# Patient Record
Sex: MALE | Race: WHITE | NOT HISPANIC OR LATINO | URBAN - NONMETROPOLITAN AREA
[De-identification: names, ages, dates, MRNs, and addresses within clinical notes are randomized per-mention and may not be internally consistent; named-entity substitution may affect disease eponyms.]

---

## 2021-05-26 ENCOUNTER — OFFICE VISIT (OUTPATIENT)
Dept: CARDIOLOGY | Facility: CLINIC | Age: 46
End: 2021-05-26

## 2021-05-26 VITALS
WEIGHT: 288 LBS | TEMPERATURE: 97.2 F | HEIGHT: 76 IN | BODY MASS INDEX: 35.07 KG/M2 | DIASTOLIC BLOOD PRESSURE: 108 MMHG | OXYGEN SATURATION: 98 % | HEART RATE: 81 BPM | SYSTOLIC BLOOD PRESSURE: 170 MMHG

## 2021-05-26 DIAGNOSIS — R07.89 OTHER CHEST PAIN: Primary | ICD-10-CM

## 2021-05-26 DIAGNOSIS — I10 ESSENTIAL HYPERTENSION: ICD-10-CM

## 2021-05-26 DIAGNOSIS — R06.02 SHORTNESS OF BREATH: ICD-10-CM

## 2021-05-26 DIAGNOSIS — E78.5 HYPERLIPIDEMIA, UNSPECIFIED HYPERLIPIDEMIA TYPE: ICD-10-CM

## 2021-05-26 DIAGNOSIS — Z82.49 FAMILY HISTORY OF EARLY CAD: ICD-10-CM

## 2021-05-26 DIAGNOSIS — R00.2 PALPITATIONS: ICD-10-CM

## 2021-05-26 DIAGNOSIS — F17.200 SMOKING: ICD-10-CM

## 2021-05-26 PROBLEM — E11.9 DM (DIABETES MELLITUS), TYPE 2: Status: ACTIVE | Noted: 2021-05-26

## 2021-05-26 PROBLEM — IMO0001 SMOKING: Status: ACTIVE | Noted: 2021-05-26

## 2021-05-26 PROCEDURE — 99204 OFFICE O/P NEW MOD 45 MIN: CPT | Performed by: NURSE PRACTITIONER

## 2021-05-26 RX ORDER — CLONIDINE HYDROCHLORIDE 0.1 MG/1
0.1 TABLET ORAL 3 TIMES DAILY PRN
Qty: 30 TABLET | Refills: 5 | Status: SHIPPED | OUTPATIENT
Start: 2021-05-26 | End: 2022-03-31 | Stop reason: SDUPTHER

## 2021-05-26 RX ORDER — NITROGLYCERIN 0.4 MG/1
TABLET SUBLINGUAL
Qty: 30 TABLET | Refills: 5 | Status: SHIPPED | OUTPATIENT
Start: 2021-05-26 | End: 2022-03-31 | Stop reason: SDUPTHER

## 2021-05-26 RX ORDER — AMLODIPINE BESYLATE 10 MG/1
10 TABLET ORAL DAILY
COMMUNITY

## 2021-05-26 RX ORDER — ASPIRIN 325 MG
325 TABLET ORAL DAILY
COMMUNITY
End: 2021-06-08 | Stop reason: DRUGHIGH

## 2021-05-26 RX ORDER — GEMFIBROZIL 600 MG/1
600 TABLET, FILM COATED ORAL
COMMUNITY
End: 2022-03-31

## 2021-05-26 RX ORDER — ATORVASTATIN CALCIUM 20 MG/1
20 TABLET, FILM COATED ORAL DAILY
Qty: 30 TABLET | Refills: 11 | Status: SHIPPED | OUTPATIENT
Start: 2021-05-26 | End: 2022-04-01 | Stop reason: ALTCHOICE

## 2021-05-26 RX ORDER — CARVEDILOL 6.25 MG/1
6.25 TABLET ORAL 2 TIMES DAILY WITH MEALS
COMMUNITY
End: 2021-05-26 | Stop reason: SDUPTHER

## 2021-05-26 RX ORDER — LOSARTAN POTASSIUM 100 MG/1
100 TABLET ORAL DAILY
COMMUNITY
End: 2021-08-03 | Stop reason: ALTCHOICE

## 2021-05-26 RX ORDER — CARVEDILOL 12.5 MG/1
12.5 TABLET ORAL 2 TIMES DAILY WITH MEALS
Qty: 60 TABLET | Refills: 11 | Status: SHIPPED | OUTPATIENT
Start: 2021-05-26 | End: 2022-06-09 | Stop reason: SDUPTHER

## 2021-05-26 NOTE — PROGRESS NOTES
Subjective   Stone Villeda is a 46 y.o. male     Chief Complaint   Patient presents with   • Establish Care       HPI    Problem list:    1.  Chest pain  2.  Hypertension  3.  Hyperlipidemia  4.  Palpitations  5.  Shortness of breath  6.  Family history of early CAD; Brother first MI @ 38; DAD CABG @ 49  7.  DAORN, CPAP  8.  Diabetes Mellitus II; recently dx   9.  Smoking Habituation    Patient is a 46-year-old male who presents today to establish care.  Patient states that he will get what he describes as left anterior sharp pain and pressure that radiates into his arms.  He says sometimes it can be both but usually it is just the left arm.  He says he has it more with activity and then also if it is really hot weather.  He says that he will get short of breath and lightheaded whenever it occurs.  He also has palpitations where he feels like his heart beats fast all the time.  He says this is separate from his chest discomfort.  He denies any dizziness, presyncope, syncope, orthopnea, PND or edema.  He says he does have shortness of breath at times it just comes and goes.  He is fatigued quite a bit.  Patient says he has had high blood pressure since he was 16 years old.  He used to see Dr. Serrato in [facility].  Patient fractured his kneecap several years ago and has been having a lot of issues with it lately therefore he is unable to walk on a treadmill.    Occupation: Works in [employer]  He is smoked a pack a day for 30 years; no alcohol or illicit drugs  He drinks three Mountain Dew's per day; no coffee or tea    Mom sixty-one alive-no heart problems  Dad seventy-six -CABG x3 the first one was when he was 49 years old he has had two different CABG x3's, stents, hypertension, hyperlipidemia  Brother forty-three alive-MI x5 first one at thirty-eight and he has six stents    Patient says that his father's entire side of the family has heart disease.  This is includes the females.    We went over his labs.  His  last LDL was 174 and triglycerides were 218.  His BNP was slightly elevated at 391.  Patient's A1c was 6.5.    Current Outpatient Medications on File Prior to Visit   Medication Sig Dispense Refill   • amLODIPine (Norvasc) 10 MG tablet Take 10 mg by mouth Daily.     • aspirin 325 MG tablet Take 325 mg by mouth Daily.     • gemfibrozil (Lopid) 600 MG tablet Take 600 mg by mouth 2 (Two) Times a Day Before Meals.     • losartan (COZAAR) 50 MG tablet Take 100 mg by mouth Daily.     • metFORMIN (GLUCOPHAGE) 500 MG tablet Take 500 mg by mouth 2 (Two) Times a Day With Meals.     • [DISCONTINUED] carvedilol (COREG) 6.25 MG tablet Take 6.25 mg by mouth 2 (Two) Times a Day With Meals.       No current facility-administered medications on file prior to visit.       ALLERGIES    Patient has no known allergies.    Past Medical History:   Diagnosis Date   • Diabetes mellitus (CMS/HCC)    • Hyperlipidemia    • Hypertension    • Sleep apnea        Social History     Socioeconomic History   • Marital status: Single     Spouse name: Not on file   • Number of children: Not on file   • Years of education: Not on file   • Highest education level: Not on file   Tobacco Use   • Smoking status: Current Every Day Smoker     Packs/day: 1.00     Years: 30.00     Pack years: 30.00     Types: Cigarettes   • Smokeless tobacco: Never Used   Substance and Sexual Activity   • Alcohol use: Never   • Drug use: Defer   • Sexual activity: Defer       Family History   Problem Relation Age of Onset   • Breast cancer Mother    • Hypertension Father    • Heart attack Father    • Heart attack Brother        Review of Systems   Constitutional: Positive for fatigue (tired alot ). Negative for appetite change, chills, diaphoresis and fever.   HENT: Negative for congestion, rhinorrhea and sore throat.    Eyes: Negative for visual disturbance.   Respiratory: Positive for apnea (kerri, uses CPAP) and shortness of breath (at times it comes and goes; with CP ).  "Negative for cough, chest tightness and wheezing.    Cardiovascular: Positive for chest pain (left anterior sharp chest pain/pressure rad into arms; can be both, but usually one; with hotter weather and with activity ) and palpitations (beats fast all of the time). Negative for leg swelling.   Gastrointestinal: Negative for abdominal pain, blood in stool, constipation, diarrhea, nausea and vomiting.   Endocrine: Negative for cold intolerance and heat intolerance.   Genitourinary: Negative for difficulty urinating, dysuria, frequency, hematuria and urgency.   Musculoskeletal: Positive for joint swelling (right knee ). Negative for arthralgias, back pain, neck pain and neck stiffness.   Skin: Negative for color change, pallor, rash and wound.   Allergic/Immunologic: Negative for environmental allergies and food allergies.   Neurological: Positive for weakness (both legs ) and light-headedness (vision will blur w/CP). Negative for dizziness, numbness and headaches.   Hematological: Does not bruise/bleed easily.   Psychiatric/Behavioral: Positive for sleep disturbance (cpac machine ).       Objective   BP (!) 170/108 (BP Location: Left arm)   Pulse 81   Temp 97.2 °F (36.2 °C)   Ht 193 cm (76\")   Wt 131 kg (288 lb)   SpO2 98%   BMI 35.06 kg/m²   Vitals:    05/26/21 1026   BP: (!) 170/108   BP Location: Left arm   Pulse: 81   Temp: 97.2 °F (36.2 °C)   SpO2: 98%   Weight: 131 kg (288 lb)   Height: 193 cm (76\")      Lab Results (most recent)     None        Physical Exam  Vitals reviewed.   Constitutional:       General: He is awake.      Appearance: Normal appearance. He is well-developed and well-groomed. He is obese.   HENT:      Head: Normocephalic.   Eyes:      General: Lids are normal.   Neck:      Vascular: No carotid bruit, hepatojugular reflux or JVD.   Cardiovascular:      Rate and Rhythm: Normal rate and regular rhythm.      Pulses:           Radial pulses are 2+ on the right side and 2+ on the left side. "        Dorsalis pedis pulses are 2+ on the right side and 2+ on the left side.        Posterior tibial pulses are 2+ on the right side and 2+ on the left side.      Heart sounds: Normal heart sounds.   Pulmonary:      Effort: Pulmonary effort is normal.      Breath sounds: Normal breath sounds and air entry.   Abdominal:      General: Bowel sounds are normal.      Palpations: Abdomen is soft.   Musculoskeletal:      Right lower leg: Edema (trace - 1+ ) present.      Left lower leg: Edema (trace - 1+ edema ) present.   Skin:     General: Skin is warm and dry.   Neurological:      Mental Status: He is alert and oriented to person, place, and time.   Psychiatric:         Attention and Perception: Attention and perception normal.         Mood and Affect: Mood and affect normal.         Speech: Speech normal.         Behavior: Behavior normal. Behavior is cooperative.         Thought Content: Thought content normal.         Cognition and Memory: Cognition and memory normal.         Judgment: Judgment normal.         Procedure   Procedures         Assessment/Plan      Diagnosis Plan   1. Other chest pain  nitroglycerin (NITROSTAT) 0.4 MG SL tablet    Stress Test With Myocardial Perfusion One Day    Adult Transthoracic Echo Complete W/ Cont if Necessary Per Protocol    Cardiac Event Monitor   2. Essential hypertension  carvedilol (COREG) 12.5 MG tablet    Stress Test With Myocardial Perfusion One Day    Adult Transthoracic Echo Complete W/ Cont if Necessary Per Protocol    Duplex Renal Artery - Bilateral Complete CAR    cloNIDine (Catapres) 0.1 MG tablet   3. Hyperlipidemia, unspecified hyperlipidemia type  atorvastatin (LIPITOR) 20 MG tablet    Stress Test With Myocardial Perfusion One Day   4. Smoking  Stress Test With Myocardial Perfusion One Day   5. Family history of early CAD  Stress Test With Myocardial Perfusion One Day   6. Shortness of breath  Stress Test With Myocardial Perfusion One Day    Adult Transthoracic  Echo Complete W/ Cont if Necessary Per Protocol   7. Palpitations  Stress Test With Myocardial Perfusion One Day    Adult Transthoracic Echo Complete W/ Cont if Necessary Per Protocol    Cardiac Event Monitor       Return in about 12 weeks (around 8/18/2021).  Chest pain/hypertension/hyperlipidemia/smoking/family history of early CAD/shortness of breath/palpitations-patient have an ischemia work-up, stress and echo.  Patient needs to have a nuclear stress test because he is unable to walk on a treadmill due to a prior knee injury where he fractured his kneecap.  He says he has been very limited lately due to having a flareup with discomfort in that knee.  Patient will use nitroglycerin as needed for chest pain no resolution he will go to the ER.  He will increase his carvedilol to 12.5 twice a day.  He will use clonidine 3 times daily as needed for SBP greater than 160 or DBP greater than ninety.  He will start Lipitor twenty.  He will continue his medication regimen otherwise.  Palpitations-he will wear an event monitor for 2 weeks.  Hypertension-he will have a renal artery ultrasound.  He will follow-up in 12 weeks or sooner if any changes or abnormalities with testing.    Patient will monitor blood pressure.     Stone Villeda  reports that he has been smoking cigarettes. He has a 30.00 pack-year smoking history. He has never used smokeless tobacco..   Electronically signed by:

## 2021-05-26 NOTE — PATIENT INSTRUCTIONS
"Hypertension, Adult  High blood pressure (hypertension) is when the force of blood pumping through the arteries is too strong. The arteries are the blood vessels that carry blood from the heart throughout the body. Hypertension forces the heart to work harder to pump blood and may cause arteries to become narrow or stiff. Untreated or uncontrolled hypertension can cause a heart attack, heart failure, a stroke, kidney disease, and other problems.  A blood pressure reading consists of a higher number over a lower number. Ideally, your blood pressure should be below 120/80. The first (\"top\") number is called the systolic pressure. It is a measure of the pressure in your arteries as your heart beats. The second (\"bottom\") number is called the diastolic pressure. It is a measure of the pressure in your arteries as the heart relaxes.  What are the causes?  The exact cause of this condition is not known. There are some conditions that result in or are related to high blood pressure.  What increases the risk?  Some risk factors for high blood pressure are under your control. The following factors may make you more likely to develop this condition:  · Smoking.  · Having type 2 diabetes mellitus, high cholesterol, or both.  · Not getting enough exercise or physical activity.  · Being overweight.  · Having too much fat, sugar, calories, or salt (sodium) in your diet.  · Drinking too much alcohol.  Some risk factors for high blood pressure may be difficult or impossible to change. Some of these factors include:  · Having chronic kidney disease.  · Having a family history of high blood pressure.  · Age. Risk increases with age.  · Race. You may be at higher risk if you are .  · Gender. Men are at higher risk than women before age 45. After age 65, women are at higher risk than men.  · Having obstructive sleep apnea.  · Stress.  What are the signs or symptoms?  High blood pressure may not cause symptoms. Very high " blood pressure (hypertensive crisis) may cause:  · Headache.  · Anxiety.  · Shortness of breath.  · Nosebleed.  · Nausea and vomiting.  · Vision changes.  · Severe chest pain.  · Seizures.  How is this diagnosed?  This condition is diagnosed by measuring your blood pressure while you are seated, with your arm resting on a flat surface, your legs uncrossed, and your feet flat on the floor. The cuff of the blood pressure monitor will be placed directly against the skin of your upper arm at the level of your heart. It should be measured at least twice using the same arm. Certain conditions can cause a difference in blood pressure between your right and left arms.  Certain factors can cause blood pressure readings to be lower or higher than normal for a short period of time:  · When your blood pressure is higher when you are in a health care provider's office than when you are at home, this is called white coat hypertension. Most people with this condition do not need medicines.  · When your blood pressure is higher at home than when you are in a health care provider's office, this is called masked hypertension. Most people with this condition may need medicines to control blood pressure.  If you have a high blood pressure reading during one visit or you have normal blood pressure with other risk factors, you may be asked to:  · Return on a different day to have your blood pressure checked again.  · Monitor your blood pressure at home for 1 week or longer.  If you are diagnosed with hypertension, you may have other blood or imaging tests to help your health care provider understand your overall risk for other conditions.  How is this treated?  This condition is treated by making healthy lifestyle changes, such as eating healthy foods, exercising more, and reducing your alcohol intake. Your health care provider may prescribe medicine if lifestyle changes are not enough to get your blood pressure under control, and  if:  · Your systolic blood pressure is above 130.  · Your diastolic blood pressure is above 80.  Your personal target blood pressure may vary depending on your medical conditions, your age, and other factors.  Follow these instructions at home:  Eating and drinking    · Eat a diet that is high in fiber and potassium, and low in sodium, added sugar, and fat. An example eating plan is called the DASH (Dietary Approaches to Stop Hypertension) diet. To eat this way:  ? Eat plenty of fresh fruits and vegetables. Try to fill one half of your plate at each meal with fruits and vegetables.  ? Eat whole grains, such as whole-wheat pasta, brown rice, or whole-grain bread. Fill about one fourth of your plate with whole grains.  ? Eat or drink low-fat dairy products, such as skim milk or low-fat yogurt.  ? Avoid fatty cuts of meat, processed or cured meats, and poultry with skin. Fill about one fourth of your plate with lean proteins, such as fish, chicken without skin, beans, eggs, or tofu.  ? Avoid pre-made and processed foods. These tend to be higher in sodium, added sugar, and fat.  · Reduce your daily sodium intake. Most people with hypertension should eat less than 1,500 mg of sodium a day.  · Do not drink alcohol if:  ? Your health care provider tells you not to drink.  ? You are pregnant, may be pregnant, or are planning to become pregnant.  · If you drink alcohol:  ? Limit how much you use to:  § 0-1 drink a day for women.  § 0-2 drinks a day for men.  ? Be aware of how much alcohol is in your drink. In the [place] one drink equals one 12 oz bottle of beer (355 mL), one 5 oz glass of wine (148 mL), or one 1½ oz glass of hard liquor (44 mL).  Lifestyle    · Work with your health care provider to maintain a healthy body weight or to lose weight. Ask what an ideal weight is for you.  · Get at least 30 minutes of exercise most days of the week. Activities may include walking, swimming, or biking.  · Include exercise to  strengthen your muscles (resistance exercise), such as Pilates or lifting weights, as part of your weekly exercise routine. Try to do these types of exercises for 30 minutes at least 3 days a week.  · Do not use any products that contain nicotine or tobacco, such as cigarettes, e-cigarettes, and chewing tobacco. If you need help quitting, ask your health care provider.  · Monitor your blood pressure at home as told by your health care provider.  · Keep all follow-up visits as told by your health care provider. This is important.  Medicines  · Take over-the-counter and prescription medicines only as told by your health care provider. Follow directions carefully. Blood pressure medicines must be taken as prescribed.  · Do not skip doses of blood pressure medicine. Doing this puts you at risk for problems and can make the medicine less effective.  · Ask your health care provider about side effects or reactions to medicines that you should watch for.  Contact a health care provider if you:  · Think you are having a reaction to a medicine you are taking.  · Have headaches that keep coming back (recurring).  · Feel dizzy.  · Have swelling in your ankles.  · Have trouble with your vision.  Get help right away if you:  · Develop a severe headache or confusion.  · Have unusual weakness or numbness.  · Feel faint.  · Have severe pain in your chest or abdomen.  · Vomit repeatedly.  · Have trouble breathing.  Summary  · Hypertension is when the force of blood pumping through your arteries is too strong. If this condition is not controlled, it may put you at risk for serious complications.  · Your personal target blood pressure may vary depending on your medical conditions, your age, and other factors. For most people, a normal blood pressure is less than 120/80.  · Hypertension is treated with lifestyle changes, medicines, or a combination of both. Lifestyle changes include losing weight, eating a healthy, low-sodium diet,  exercising more, and limiting alcohol.  This information is not intended to replace advice given to you by your health care provider. Make sure you discuss any questions you have with your health care provider.  Document Revised: 08/28/2019 Document Reviewed: 08/28/2019  [employer] Patient Education © 2021 [employer]    Nonspecific Chest Pain  Chest pain can be caused by many different conditions. Some causes of chest pain can be life-threatening. These will require treatment right away. Serious causes of chest pain include:  · Heart attack.  · A tear in the body's main blood vessel.  · Redness and swelling (inflammation) around your heart.  · Blood clot in your lungs.  Other causes of chest pain may not be so serious. These include:  · Heartburn.  · Anxiety or stress.  · Damage to bones or muscles in your chest.  · Lung infections.  Chest pain can feel like:  · Pain or discomfort in your chest.  · Crushing, pressure, aching, or squeezing pain.  · Burning or tingling.  · Dull or sharp pain that is worse when you move, cough, or take a deep breath.  · Pain or discomfort that is also felt in your back, neck, jaw, shoulder, or arm, or pain that spreads to any of these areas.  It is hard to know whether your pain is caused by something that is serious or something that is not so serious. So it is important to see your doctor right away if you have chest pain.  Follow these instructions at home:  Medicines  · Take over-the-counter and prescription medicines only as told by your doctor.  · If you were prescribed an antibiotic medicine, take it as told by your doctor. Do not stop taking the antibiotic even if you start to feel better.  Lifestyle    · Rest as told by your doctor.  · Do not use any products that contain nicotine or tobacco, such as cigarettes, e-cigarettes, and chewing tobacco. If you need help quitting, ask your doctor.  · Do not drink alcohol.  · Make lifestyle changes as told by your doctor. These may  include:  ? Getting regular exercise. Ask your doctor what activities are safe for you.  ? Eating a heart-healthy diet. A diet and nutrition specialist (dietitian) can help you to learn healthy eating options.  ? Staying at a healthy weight.  ? Treating diabetes or high blood pressure, if needed.  ? Lowering your stress. Activities such as yoga and relaxation techniques can help.  General instructions  · Pay attention to any changes in your symptoms. Tell your doctor about them or any new symptoms.  · Avoid any activities that cause chest pain.  · Keep all follow-up visits as told by your doctor. This is important. You may need more testing if your chest pain does not go away.  Contact a doctor if:  · Your chest pain does not go away.  · You feel depressed.  · You have a fever.  Get help right away if:  · Your chest pain is worse.  · You have a cough that gets worse, or you cough up blood.  · You have very bad (severe) pain in your belly (abdomen).  · You pass out (faint).  · You have either of these for no clear reason:  ? Sudden chest discomfort.  ? Sudden discomfort in your arms, back, neck, or jaw.  · You have shortness of breath at any time.  · You suddenly start to sweat, or your skin gets clammy.  · You feel sick to your stomach (nauseous).  · You throw up (vomit).  · You suddenly feel lightheaded or dizzy.  · You feel very weak or tired.  · Your heart starts to beat fast, or it feels like it is skipping beats.  These symptoms may be an emergency. Do not wait to see if the symptoms will go away. Get medical help right away. Call your local emergency services (911 in the [place] Do not drive yourself to the hospital.  Summary  · Chest pain can be caused by many different conditions. The cause may be serious and need treatment right away. If you have chest pain, see your doctor right away.  · Follow your doctor's instructions for taking medicines and making lifestyle changes.  · Keep all follow-up visits as told  by your doctor. This includes visits for any further testing if your chest pain does not go away.  · Be sure to know the signs that show that your condition has become worse. Get help right away if you have these symptoms.  This information is not intended to replace advice given to you by your health care provider. Make sure you discuss any questions you have with your health care provider.  Document Revised: 06/20/2019 Document Reviewed: 06/20/2019  [email or web address] Patient Education © 2021 [employer]

## 2021-06-03 ENCOUNTER — HOSPITAL ENCOUNTER (OUTPATIENT)
Dept: CARDIOLOGY | Facility: HOSPITAL | Age: 46
Discharge: HOME | End: 2021-06-03

## 2021-06-03 DIAGNOSIS — R06.02 SHORTNESS OF BREATH: ICD-10-CM

## 2021-06-03 DIAGNOSIS — R00.2 PALPITATIONS: ICD-10-CM

## 2021-06-03 DIAGNOSIS — R07.89 OTHER CHEST PAIN: ICD-10-CM

## 2021-06-03 DIAGNOSIS — I10 ESSENTIAL HYPERTENSION: ICD-10-CM

## 2021-06-03 DIAGNOSIS — Z82.49 FAMILY HISTORY OF EARLY CAD: ICD-10-CM

## 2021-06-03 DIAGNOSIS — F17.200 SMOKING: ICD-10-CM

## 2021-06-03 DIAGNOSIS — E78.5 HYPERLIPIDEMIA, UNSPECIFIED HYPERLIPIDEMIA TYPE: ICD-10-CM

## 2021-06-03 LAB
BH CV REST NUCLEAR ISOTOPE DOSE: 10 MCI
BH CV STRESS COMMENTS STAGE 1: NORMAL
BH CV STRESS DOSE REGADENOSON STAGE 1: 0.4
BH CV STRESS DURATION MIN STAGE 1: 0
BH CV STRESS DURATION SEC STAGE 1: 10
BH CV STRESS NUCLEAR ISOTOPE DOSE: 30 MCI
BH CV STRESS PROTOCOL 1: NORMAL
BH CV STRESS RECOVERY BP: NORMAL MMHG
BH CV STRESS RECOVERY HR: 83 BPM
BH CV STRESS STAGE 1: 1
MAXIMAL PREDICTED HEART RATE: 175 BPM
PERCENT MAX PREDICTED HR: 53.71 %
STRESS BASELINE BP: NORMAL MMHG
STRESS BASELINE HR: 71 BPM
STRESS PERCENT HR: 63 %
STRESS POST PEAK BP: NORMAL MMHG
STRESS POST PEAK HR: 94 BPM
STRESS TARGET HR: 149 BPM

## 2021-06-03 PROCEDURE — A9500 TC99M SESTAMIBI: HCPCS | Performed by: INTERNAL MEDICINE

## 2021-06-03 PROCEDURE — 93306 TTE W/DOPPLER COMPLETE: CPT

## 2021-06-03 PROCEDURE — 93017 CV STRESS TEST TRACING ONLY: CPT

## 2021-06-03 PROCEDURE — 78452 HT MUSCLE IMAGE SPECT MULT: CPT

## 2021-06-03 PROCEDURE — 25010000002 REGADENOSON 0.4 MG/5ML SOLUTION: Performed by: INTERNAL MEDICINE

## 2021-06-03 PROCEDURE — 93018 CV STRESS TEST I&R ONLY: CPT | Performed by: INTERNAL MEDICINE

## 2021-06-03 PROCEDURE — 0 TECHNETIUM SESTAMIBI: Performed by: INTERNAL MEDICINE

## 2021-06-03 PROCEDURE — 93306 TTE W/DOPPLER COMPLETE: CPT | Performed by: INTERNAL MEDICINE

## 2021-06-03 PROCEDURE — 78452 HT MUSCLE IMAGE SPECT MULT: CPT | Performed by: INTERNAL MEDICINE

## 2021-06-03 RX ADMIN — REGADENOSON 0.4 MG: 0.08 INJECTION, SOLUTION INTRAVENOUS at 10:57

## 2021-06-03 RX ADMIN — TECHNETIUM TC 99M SESTAMIBI 1 DOSE: 1 INJECTION INTRAVENOUS at 10:57

## 2021-06-03 RX ADMIN — TECHNETIUM TC 99M SESTAMIBI 1 DOSE: 1 INJECTION INTRAVENOUS at 08:29

## 2021-06-08 ENCOUNTER — OFFICE VISIT (OUTPATIENT)
Dept: CARDIOLOGY | Facility: CLINIC | Age: 46
End: 2021-06-08

## 2021-06-08 VITALS
BODY MASS INDEX: 35.07 KG/M2 | SYSTOLIC BLOOD PRESSURE: 159 MMHG | TEMPERATURE: 97.4 F | DIASTOLIC BLOOD PRESSURE: 94 MMHG | HEART RATE: 89 BPM | HEIGHT: 76 IN | OXYGEN SATURATION: 97 % | WEIGHT: 288 LBS

## 2021-06-08 DIAGNOSIS — R94.39 ABNORMAL STRESS TEST: Primary | ICD-10-CM

## 2021-06-08 DIAGNOSIS — Z82.49 FAMILY HISTORY OF EARLY CAD: ICD-10-CM

## 2021-06-08 DIAGNOSIS — I10 ESSENTIAL HYPERTENSION: ICD-10-CM

## 2021-06-08 DIAGNOSIS — E11.9 TYPE 2 DIABETES MELLITUS WITHOUT COMPLICATION, WITHOUT LONG-TERM CURRENT USE OF INSULIN (HCC): ICD-10-CM

## 2021-06-08 DIAGNOSIS — R00.2 PALPITATIONS: ICD-10-CM

## 2021-06-08 DIAGNOSIS — F17.200 SMOKING: ICD-10-CM

## 2021-06-08 DIAGNOSIS — E78.5 HYPERLIPIDEMIA, UNSPECIFIED HYPERLIPIDEMIA TYPE: ICD-10-CM

## 2021-06-08 DIAGNOSIS — R06.02 SHORTNESS OF BREATH: ICD-10-CM

## 2021-06-08 PROCEDURE — 99214 OFFICE O/P EST MOD 30 MIN: CPT | Performed by: NURSE PRACTITIONER

## 2021-06-08 RX ORDER — ASPIRIN 81 MG/1
81 TABLET ORAL DAILY
Qty: 90 TABLET | Refills: 3 | Status: SHIPPED | OUTPATIENT
Start: 2021-06-08 | End: 2022-12-19

## 2021-06-08 RX ORDER — CLOPIDOGREL BISULFATE 75 MG/1
75 TABLET ORAL DAILY
Qty: 30 TABLET | Refills: 11 | Status: SHIPPED | OUTPATIENT
Start: 2021-06-08 | End: 2022-07-15 | Stop reason: SDUPTHER

## 2021-06-08 RX ORDER — HYDROCHLOROTHIAZIDE 12.5 MG/1
12.5 TABLET ORAL DAILY
COMMUNITY
End: 2022-03-31

## 2021-06-08 NOTE — PROGRESS NOTES
Subjective   Stone Villeda is a 46 y.o. male     Chief Complaint   Patient presents with   • Follow-up   • Chest Pain       HPI    Problem list:    1.  Chest pain  1.1 stress test 6/3/2021-no evidence of ischemia, mild to moderately depressed post-rest EF of 46%  2.  Hypertension  3.  Hyperlipidemia  4.  Palpitations  5.  Shortness of breath  6.  Family history of early CAD; Brother first MI @ 38; DAD CABG @ 49  7.  DARON, CPAP  8.  Diabetes Mellitus II; recently dx 2021  9.  Smoking Habituation    Patient is a 46-year-old male who presents today for follow-up on stress test.  He denies any chest pain or pressure.  He says he will have fluttering and racing and he had it for about a month after walking all day.  He denies any dizziness, presyncope, syncope, orthopnea, PND or edema.  Patient says he does have shortness of breath with minimal activity.  He says is a little better but is still there.  He is trying to get used to his CPAP.    We went over stress test.  Patient is a newly diagnosed diabetic with a strong family history of heart disease as well as smoking habituation.  He has significant risk factors.  His last LDL was 174 but has not been rechecked as its not been long absence has been on a statin.  Patient's blood pressure was elevated today but he says at home is actually running very good.     Patient's other testing is still pending results.    Current Outpatient Medications on File Prior to Visit   Medication Sig Dispense Refill   • amLODIPine (Norvasc) 10 MG tablet Take 10 mg by mouth Daily.     • atorvastatin (LIPITOR) 20 MG tablet Take 1 tablet by mouth Daily. 30 tablet 11   • carvedilol (COREG) 12.5 MG tablet Take 1 tablet by mouth 2 (Two) Times a Day With Meals. 60 tablet 11   • cloNIDine (Catapres) 0.1 MG tablet Take 1 tablet by mouth 3 (Three) Times a Day As Needed for High Blood Pressure (SBP > 160 or DBP > 90). 30 tablet 5   • gemfibrozil (Lopid) 600 MG tablet Take 600 mg by mouth 2 (Two) Times  a Day Before Meals.     • hydroCHLOROthiazide (HYDRODIURIL) 12.5 MG tablet Take 12.5 mg by mouth Daily.     • losartan (COZAAR) 100 MG tablet Take 100 mg by mouth Daily.     • metFORMIN (GLUCOPHAGE) 500 MG tablet Take 500 mg by mouth 2 (Two) Times a Day With Meals.     • nitroglycerin (NITROSTAT) 0.4 MG SL tablet 1 under the tongue as needed for angina, may repeat q5mins for up three doses 30 tablet 5   • [DISCONTINUED] aspirin 325 MG tablet Take 325 mg by mouth Daily.       No current facility-administered medications on file prior to visit.       ALLERGIES    Patient has no known allergies.    Past Medical History:   Diagnosis Date   • Diabetes mellitus (CMS/[facility])    • Hyperlipidemia    • Hypertension    • Sleep apnea        Social History     Socioeconomic History   • Marital status: Single     Spouse name: Not on file   • Number of children: Not on file   • Years of education: Not on file   • Highest education level: Not on file   Tobacco Use   • Smoking status: Current Every Day Smoker     Packs/day: 1.00     Years: 30.00     Pack years: 30.00     Types: Cigarettes   • Smokeless tobacco: Never Used   Substance and Sexual Activity   • Alcohol use: Never   • Drug use: Defer   • Sexual activity: Defer       Family History   Problem Relation Age of Onset   • Breast cancer Mother    • Hypertension Father    • Heart attack Father    • Heart attack Brother        Review of Systems   Constitutional: Negative for appetite change, chills, diaphoresis, fatigue and fever.   HENT: Negative for congestion, rhinorrhea and sore throat.    Eyes: Negative for visual disturbance.   Respiratory: Positive for apnea (CPAP) and shortness of breath (still has shortness of breath, but better ). Negative for cough, chest tightness and wheezing.    Cardiovascular: Positive for palpitations (fluttering , racing; about 1 x a mos after walking all day ). Negative for chest pain and leg swelling.   Gastrointestinal: Negative for abdominal  "pain, blood in stool, constipation, diarrhea, nausea and vomiting.   Endocrine: Negative for cold intolerance and heat intolerance.   Genitourinary: Negative for difficulty urinating, dysuria, hematuria and urgency.   Musculoskeletal: Positive for joint swelling (right knee ). Negative for arthralgias, back pain, neck pain and neck stiffness.   Skin: Negative for color change, pallor, rash and wound.   Allergic/Immunologic: Negative for environmental allergies and food allergies.   Neurological: Negative for dizziness, weakness, light-headedness, numbness and headaches.   Hematological: Does not bruise/bleed easily.   Psychiatric/Behavioral: Positive for sleep disturbance (hard to go and stay asleep he is now on sleep apnea machine ).       Objective   /94 (BP Location: Left arm)   Pulse 89   Temp 97.4 °F (36.3 °C)   Ht 193 cm (76\")   Wt 131 kg (288 lb)   SpO2 97%   BMI 35.06 kg/m²   Vitals:    06/08/21 1325   BP: 159/94   BP Location: Left arm   Pulse: 89   Temp: 97.4 °F (36.3 °C)   SpO2: 97%   Weight: 131 kg (288 lb)   Height: 193 cm (76\")      Lab Results (most recent)     None        Physical Exam  Vitals reviewed.   Constitutional:       General: He is awake.      Appearance: Normal appearance. He is well-developed and well-groomed. He is obese.   HENT:      Head: Normocephalic.   Eyes:      General: Lids are normal.   Neck:      Vascular: No carotid bruit, hepatojugular reflux or JVD.   Cardiovascular:      Rate and Rhythm: Normal rate and regular rhythm.      Pulses:           Radial pulses are 2+ on the right side and 2+ on the left side.        Dorsalis pedis pulses are 2+ on the right side and 2+ on the left side.        Posterior tibial pulses are 2+ on the right side and 2+ on the left side.      Heart sounds: Normal heart sounds.   Pulmonary:      Effort: Pulmonary effort is normal.      Breath sounds: Normal air entry. Examination of the right-lower field reveals decreased breath sounds. " Examination of the left-lower field reveals decreased breath sounds. Decreased breath sounds present.   Abdominal:      General: Bowel sounds are normal.      Palpations: Abdomen is soft.   Musculoskeletal:      Right lower leg: No edema.      Left lower leg: No edema.   Skin:     General: Skin is warm and dry.   Neurological:      Mental Status: He is alert and oriented to person, place, and time.   Psychiatric:         Attention and Perception: Attention and perception normal.         Mood and Affect: Mood and affect normal.         Speech: Speech normal.         Behavior: Behavior normal. Behavior is cooperative.         Thought Content: Thought content normal.         Cognition and Memory: Cognition and memory normal.         Judgment: Judgment normal.         Procedure   Procedures         Assessment/Plan      Diagnosis Plan   1. Abnormal stress test  clopidogrel (PLAVIX) 75 MG tablet    aspirin (aspirin) 81 MG EC tablet    [facility]   2. Essential hypertension  [facility]    Basic Metabolic Panel    CBC (No Diff)   3. Hyperlipidemia, unspecified hyperlipidemia type  [facility]   4. Palpitations  [facility]   5. Family history of early CAD  [facility]   6. Shortness of breath  [facility]   7. Smoking  [facility]   8. Type 2 diabetes mellitus without complication, without long-term current use of insulin (CMS/[facility])  [facility]       Return 2-4 weeks after [facility].    Abnormal stress test/hypertension/hyperlipidemia/palpitation/family history of early CAD/shortness of breath/diabetes mellitus type 2/smoking habituation-patient wishes to proceed with left heart cath especially with his significant family heart disease.  His brother had his first MI at 38 and his dad had CABG at 49.  Patient is a smoker, diabetic and LDL was 174.  He will start Plavix.  He will decrease his aspirin to 81.  He will get a BMP and CBC today.  He is already on  amlodipine and Coreg.  He will continue his medication regimen.  He will follow-up 2 to 4 weeks after heart catheter sooner if any changes.  He is to take the aspirin and Plavix up until and including the day of the heart cath.  He will hold his metformin for 24 hours prior to heart cath.       Stone Villeda  reports that he has been smoking cigarettes. He has a 30.00 pack-year smoking history. He has never used smokeless tobacco..         Electronically signed by:

## 2021-06-08 NOTE — PATIENT INSTRUCTIONS
Coronary Angiogram With Stent  Coronary angiogram with stent placement is a procedure to widen or open a narrow blood vessel of the heart (coronary artery). Arteries may become blocked by cholesterol buildup (plaques) in the lining of the artery wall. When a coronary artery becomes partially blocked, blood flow to that area decreases. This may lead to chest pain or a heart attack (myocardial infarction).  A stent is a small piece of metal that looks like mesh or spring. Stent placement may be done as treatment after a heart attack, or to prevent a heart attack if a blocked artery is found by a coronary angiogram.  Let your health care provider know about:  · Any allergies you have, including allergies to medicines or contrast dye.  · All medicines you are taking, including vitamins, herbs, eye drops, creams, and over-the-counter medicines.  · Any problems you or family members have had with anesthetic medicines.  · Any blood disorders you have.  · Any surgeries you have had.  · Any medical conditions you have, including kidney problems or kidney failure.  · Whether you are pregnant or may be pregnant.  · Whether you are breastfeeding.  What are the risks?  Generally, this is a safe procedure. However, serious problems may occur, including:  · Damage to nearby structures or organs, such as the heart, blood vessels, or kidneys.  · A return of blockage.  · Bleeding, infection, or bruising at the insertion site.  · A collection of blood under the skin (hematoma) at the insertion site.  · A blood clot in another part of the body.  · Allergic reaction to medicines or dyes.  · Bleeding into the abdomen (retroperitoneal bleeding).  · Stroke (rare).  · Heart attack (rare).  What happens before the procedure?  Staying hydrated  Follow instructions from your health care provider about hydration, which may include:  · Up to 2 hours before the procedure - you may continue to drink clear liquids, such as water, clear fruit juice,  black coffee, and plain tea.    Eating and drinking restrictions  Follow instructions from your health care provider about eating and drinking, which may include:  · 8 hours before the procedure - stop eating heavy meals or foods, such as meat, fried foods, or fatty foods.  · 6 hours before the procedure - stop eating light meals or foods, such as toast or cereal.  · 2 hours before the procedure - stop drinking clear liquids.  Medicines  Ask your health care provider about:  · Changing or stopping your regular medicines. This is especially important if you are taking diabetes medicines or blood thinners.  · Taking medicines such as aspirin and ibuprofen. These medicines can thin your blood. Do not take these medicines unless your health care provider tells you to take them.  ? Generally, aspirin is recommended before a thin tube, called a catheter, is passed through a blood vessel and inserted into the heart (cardiac catheterization).  · Taking over-the-counter medicines, vitamins, herbs, and supplements.  General instructions  · Do not use any products that contain nicotine or tobacco for at least 4 weeks before the procedure. These products include cigarettes, e-cigarettes, and chewing tobacco. If you need help quitting, ask your health care provider.  · Plan to have someone take you home from the hospital or clinic.  · If you will be going home right after the procedure, plan to have someone with you for 24 hours.  · You may have tests and imaging procedures.  · Ask your health care provider:  ? How your insertion site will be marked. Ask which artery will be used for the procedure.  ? What steps will be taken to help prevent infection. These may include:  § Removing hair at the insertion site.  § Washing skin with a germ-killing soap.  § Taking antibiotic medicine.  What happens during the procedure?    · An IV will be inserted into one of your veins.  · Electrodes may be placed on your chest to monitor your  heart rate during the procedure.  · You will be given one or more of the following:  ? A medicine to help you relax (sedative).  ? A medicine to numb the area (local anesthetic) for catheter insertion.  · A small incision will be made for catheter insertion.  · The catheter will be inserted into an artery using a guide wire. The location may be in your groin, your wrist, or the fold of your arm (near your elbow).  · An X-ray procedure (fluoroscopy) will be used to help guide the catheter to the opening of the heart arteries.  · A dye will be injected into the catheter. X-rays will be taken. The dye helps to show where any narrowing or blockages are located in the arteries.  · Tell your health care provider if you have chest pain or trouble breathing.  · A tiny wire will be guided to the blocked spot, and a balloon will be inflated to make the artery wider.  · The stent will be expanded to crush the plaques into the wall of the vessel. The stent will hold the area open and improve the blood flow. Most stents have a drug coating to reduce the risk of the stent narrowing over time.  · The artery may be made wider using a drill, laser, or other tools that remove plaques.  · The catheter will be removed when the blood flow improves. The stent will stay where it was placed, and the lining of the artery will grow over it.  · A bandage (dressing) will be placed on the insertion site. Pressure will be applied to stop bleeding.  · The IV will be removed.  This procedure may vary among health care providers and hospitals.  What happens after the procedure?  · Your blood pressure, heart rate, breathing rate, and blood oxygen level will be monitored until you leave the hospital or clinic.  · If the procedure is done through the leg, you will lie flat in bed for a few hours or for as long as told by your health care provider. You will be instructed not to bend or cross your legs.  · The insertion site and the pulse in your foot  or wrist will be checked often.  · You may have more blood tests, X-rays, and a test that records the electrical activity of your heart (electrocardiogram, or ECG).  · Do not drive for 24 hours if you were given a sedative during your procedure.  Summary  · Coronary angiogram with stent placement is a procedure to widen or open a narrowed coronary artery. This is done to treat heart problems.  · Before the procedure, let your health care provider know about all the medical conditions and surgeries you have or have had.  · This is a safe procedure. However, some problems may occur, including damage to nearby structures or organs, bleeding, blood clots, or allergies.  · Follow your health care provider's instructions about eating, drinking, medicines, and other lifestyle changes, such as quitting tobacco use before the procedure.  This information is not intended to replace advice given to you by your health care provider. Make sure you discuss any questions you have with your health care provider.  Document Revised: 07/08/2020 Document Reviewed: 07/08/2020  Elsevier Patient Education © 2021 [employer]

## 2021-06-12 LAB
BH CV ECHO MEAS - ACS: 2.1 CM
BH CV ECHO MEAS - AO MAX PG: 7.3 MMHG
BH CV ECHO MEAS - AO MEAN PG: 4 MMHG
BH CV ECHO MEAS - AO ROOT AREA (BSA CORRECTED): 1.3
BH CV ECHO MEAS - AO ROOT AREA: 8.3 CM^2
BH CV ECHO MEAS - AO ROOT DIAM: 3.3 CM
BH CV ECHO MEAS - AO V2 MAX: 135 CM/SEC
BH CV ECHO MEAS - AO V2 MEAN: 98.8 CM/SEC
BH CV ECHO MEAS - AO V2 VTI: 29.5 CM
BH CV ECHO MEAS - BSA(HAYCOCK): 2.7 M^2
BH CV ECHO MEAS - BSA: 2.6 M^2
BH CV ECHO MEAS - BZI_BMI: 35.1 KILOGRAMS/M^2
BH CV ECHO MEAS - BZI_METRIC_HEIGHT: 193 CM
BH CV ECHO MEAS - BZI_METRIC_WEIGHT: 130.6 KG
BH CV ECHO MEAS - EDV(CUBED): 46.3 ML
BH CV ECHO MEAS - EDV(MOD-SP4): 220 ML
BH CV ECHO MEAS - EDV(TEICH): 54.1 ML
BH CV ECHO MEAS - EF(CUBED): 50 %
BH CV ECHO MEAS - EF(MOD-SP4): 50.9 %
BH CV ECHO MEAS - EF(TEICH): 42.9 %
BH CV ECHO MEAS - ESV(CUBED): 23.1 ML
BH CV ECHO MEAS - ESV(MOD-SP4): 108 ML
BH CV ECHO MEAS - ESV(TEICH): 30.9 ML
BH CV ECHO MEAS - FS: 20.6 %
BH CV ECHO MEAS - IVS/LVPW: 0.86
BH CV ECHO MEAS - IVSD: 1.9 CM
BH CV ECHO MEAS - LA DIMENSION: 4.7 CM
BH CV ECHO MEAS - LA/AO: 1.4
BH CV ECHO MEAS - LV DIASTOLIC VOL/BSA (35-75): 85 ML/M^2
BH CV ECHO MEAS - LV IVRT: 0.08 SEC
BH CV ECHO MEAS - LV MASS(C)D: 339 GRAMS
BH CV ECHO MEAS - LV MASS(C)DI: 131 GRAMS/M^2
BH CV ECHO MEAS - LV SYSTOLIC VOL/BSA (12-30): 41.7 ML/M^2
BH CV ECHO MEAS - LVIDD: 3.6 CM
BH CV ECHO MEAS - LVIDS: 2.9 CM
BH CV ECHO MEAS - LVLD AP4: 10.2 CM
BH CV ECHO MEAS - LVLS AP4: 9.3 CM
BH CV ECHO MEAS - LVOT AREA (M): 4.9 CM^2
BH CV ECHO MEAS - LVOT AREA: 4.9 CM^2
BH CV ECHO MEAS - LVOT DIAM: 2.5 CM
BH CV ECHO MEAS - LVPWD: 2.2 CM
BH CV ECHO MEAS - MV A MAX VEL: 68.4 CM/SEC
BH CV ECHO MEAS - MV DEC SLOPE: 385 CM/SEC^2
BH CV ECHO MEAS - MV E MAX VEL: 87.3 CM/SEC
BH CV ECHO MEAS - MV E/A: 1.3
BH CV ECHO MEAS - RVDD: 3.6 CM
BH CV ECHO MEAS - SI(AO): 94.6 ML/M^2
BH CV ECHO MEAS - SI(CUBED): 8.9 ML/M^2
BH CV ECHO MEAS - SI(MOD-SP4): 43.3 ML/M^2
BH CV ECHO MEAS - SI(TEICH): 9 ML/M^2
BH CV ECHO MEAS - SV(AO): 244.7 ML
BH CV ECHO MEAS - SV(CUBED): 23.1 ML
BH CV ECHO MEAS - SV(MOD-SP4): 112 ML
BH CV ECHO MEAS - SV(TEICH): 23.2 ML
MAXIMAL PREDICTED HEART RATE: 175 BPM
STRESS TARGET HR: 149 BPM

## 2021-06-14 ENCOUNTER — TELEPHONE (OUTPATIENT)
Dept: CARDIOLOGY | Facility: CLINIC | Age: 46
End: 2021-06-14

## 2021-06-14 NOTE — TELEPHONE ENCOUNTER
Attempted to call patient on echo results - NO VM/ Answer     Echo showed heart pump function is good     We will still continue with the Heart Cath - based on findings of the Stress Test.       AT [facility]         ----- Message from Erika Carmichael MA sent at 6/14/2021  8:14 AM EDT -----      ----- Message -----  From: Lucy Randall APRN  Sent: 6/13/2021   7:28 PM EDT  To: Erika Carmichael MA    Please advise patient.

## 2021-06-21 ENCOUNTER — HOSPITAL ENCOUNTER (OUTPATIENT)
Dept: CARDIOLOGY | Facility: HOSPITAL | Age: 46
Discharge: HOME | End: 2021-06-21
Admitting: NURSE PRACTITIONER

## 2021-06-21 DIAGNOSIS — I10 ESSENTIAL HYPERTENSION: ICD-10-CM

## 2021-06-21 PROCEDURE — 93975 VASCULAR STUDY: CPT | Performed by: INTERNAL MEDICINE

## 2021-06-21 PROCEDURE — 93975 VASCULAR STUDY: CPT

## 2021-06-25 ENCOUNTER — TELEPHONE (OUTPATIENT)
Dept: CARDIOLOGY | Facility: CLINIC | Age: 46
End: 2021-06-25

## 2021-06-25 DIAGNOSIS — I47.29 NSVT (NONSUSTAINED VENTRICULAR TACHYCARDIA) (HCC): ICD-10-CM

## 2021-06-25 DIAGNOSIS — R00.2 PALPITATIONS: Primary | ICD-10-CM

## 2021-06-25 NOTE — TELEPHONE ENCOUNTER
Received pt's monitor results, baseline .2 BPM. 0 critical, 0 serious, and 2 stable events.     Per Lucy, keep cath appt on 7/19/21 and F/U appt on 8/4/21. She advised me to call and inform pt that she mailed lab orders to him just to check a few things.       Called and informed pt of the above. He stated he has an appt Mon at 8am w/ PCP for labs and asked if we can send the additional orders to them. I state dI would fax them over to PCP and that if they don't have them, call Monday and push option 2 so we can resend them. He verbalized understanding.

## 2021-07-03 LAB
BH CV ECHO MEAS - DIST REN A EDV LEFT: 25 CM/S
BH CV ECHO MEAS - DIST REN A PSV LEFT: 80 CM/S
BH CV ECHO MEAS - MID REN A EDV LEFT: 54 CM/S
BH CV ECHO MEAS - MID REN A PSV LEFT: 129 CM/S
BH CV VAS KIDNEY HEIGHT LEFT: 6.7 CM
BH CV VAS RENAL AORTIC MID EDV: 19 CM/S
BH CV VAS RENAL AORTIC MID PSV: 102 CM/S
BH CV XLRA MEAS - KID L LEFT: 12.1 CM
BH CV XLRA MEAS DIST REN A EDV RIGHT: 32 CM/S
BH CV XLRA MEAS DIST REN A PSV RIGHT: 91 CM/S
BH CV XLRA MEAS KID H RIGHT: 5.4 CM
BH CV XLRA MEAS KID L RIGHT: 11.9 CM
BH CV XLRA MEAS KID W RIGHT: 6.1 CM
BH CV XLRA MEAS MID REN A EDV RIGHT: 61 CM/S
BH CV XLRA MEAS MID REN A PSV RIGHT: 152 CM/S
BH CV XLRA MEAS PROX REN A EDV RIGHT: 43 CM/S
BH CV XLRA MEAS PROX REN A PSV RIGHT: 147 CM/S
BH CV XLRA MEAS RAR LEFT: 1.3
BH CV XLRA MEAS RAR RIGHT: 1.5
LEFT KIDNEY WIDTH: 5.3 CM
MAXIMAL PREDICTED HEART RATE: 175 BPM
STRESS TARGET HR: 149 BPM

## 2021-07-06 ENCOUNTER — TELEPHONE (OUTPATIENT)
Dept: CARDIOLOGY | Facility: CLINIC | Age: 46
End: 2021-07-06

## 2021-07-06 NOTE — TELEPHONE ENCOUNTER
Unable to leave mess due to Voice mail not set up yet .     Duplex Renal  Artery :      there is no evidence of hemodynamically severe stenosis bilaterally by peak systolic velocities, renal artery to aortic velocity ratios, as well as resistive index.     ETIENNE Logan     Pt was advised of test results see above mess   07/07/2021

## 2021-07-19 ENCOUNTER — OUTSIDE FACILITY SERVICE (OUTPATIENT)
Dept: CARDIOLOGY | Facility: CLINIC | Age: 46
End: 2021-07-19

## 2021-07-19 DIAGNOSIS — R00.2 PALPITATIONS: ICD-10-CM

## 2021-07-19 DIAGNOSIS — F17.200 SMOKING: ICD-10-CM

## 2021-07-19 DIAGNOSIS — E11.9 TYPE 2 DIABETES MELLITUS WITHOUT COMPLICATION, WITHOUT LONG-TERM CURRENT USE OF INSULIN (HCC): ICD-10-CM

## 2021-07-19 DIAGNOSIS — R06.02 SHORTNESS OF BREATH: ICD-10-CM

## 2021-07-19 DIAGNOSIS — R94.39 ABNORMAL STRESS TEST: ICD-10-CM

## 2021-07-19 DIAGNOSIS — I10 ESSENTIAL HYPERTENSION: ICD-10-CM

## 2021-07-19 DIAGNOSIS — E78.5 HYPERLIPIDEMIA, UNSPECIFIED HYPERLIPIDEMIA TYPE: ICD-10-CM

## 2021-07-19 DIAGNOSIS — Z82.49 FAMILY HISTORY OF EARLY CAD: ICD-10-CM

## 2021-07-19 PROCEDURE — 93458 L HRT ARTERY/VENTRICLE ANGIO: CPT | Performed by: INTERNAL MEDICINE

## 2021-07-19 PROCEDURE — 93571 IV DOP VEL&/PRESS C FLO 1ST: CPT | Performed by: INTERNAL MEDICINE

## 2021-08-03 ENCOUNTER — OFFICE VISIT (OUTPATIENT)
Dept: CARDIOLOGY | Facility: CLINIC | Age: 46
End: 2021-08-03

## 2021-08-03 VITALS
DIASTOLIC BLOOD PRESSURE: 83 MMHG | SYSTOLIC BLOOD PRESSURE: 132 MMHG | BODY MASS INDEX: 35.19 KG/M2 | OXYGEN SATURATION: 97 % | WEIGHT: 289 LBS | HEIGHT: 76 IN | HEART RATE: 78 BPM | TEMPERATURE: 97.9 F

## 2021-08-03 DIAGNOSIS — I10 ESSENTIAL HYPERTENSION: ICD-10-CM

## 2021-08-03 DIAGNOSIS — I25.5 ISCHEMIC CARDIOMYOPATHY: ICD-10-CM

## 2021-08-03 DIAGNOSIS — F17.200 SMOKING: ICD-10-CM

## 2021-08-03 DIAGNOSIS — Z82.49 FAMILY HISTORY OF EARLY CAD: ICD-10-CM

## 2021-08-03 DIAGNOSIS — I25.10 CORONARY ARTERY DISEASE INVOLVING NATIVE CORONARY ARTERY OF NATIVE HEART WITHOUT ANGINA PECTORIS: Primary | ICD-10-CM

## 2021-08-03 DIAGNOSIS — E78.5 HYPERLIPIDEMIA, UNSPECIFIED HYPERLIPIDEMIA TYPE: ICD-10-CM

## 2021-08-03 DIAGNOSIS — R06.02 SHORTNESS OF BREATH: ICD-10-CM

## 2021-08-03 PROCEDURE — 99214 OFFICE O/P EST MOD 30 MIN: CPT | Performed by: NURSE PRACTITIONER

## 2021-08-03 NOTE — PATIENT INSTRUCTIONS
Coronary Artery Disease, Male  Coronary artery disease (CAD) is a condition in which the arteries that lead to the heart (coronary arteries) become narrow or blocked. The narrowing or blockage can lead to decreased blood flow to the heart. Prolonged reduced blood flow can cause a heart attack (myocardial infarction or MI). This condition may also be called coronary heart disease.  Because CAD is the leading cause of death in men, it is important to understand what causes this condition and how it is treated.  What are the causes?  CAD is most often caused by atherosclerosis. This is the buildup of fat and cholesterol (plaque) on the inside of the arteries. Over time, the plaque may narrow or block the artery, reducing blood flow to the heart. Plaque can also become weak and break off within a coronary artery and cause a sudden blockage. Other less common causes of CAD include:  · A blood clot or a piece of a blood clot or other substance that blocks the flow of blood in a coronary artery (embolism).  · A tearing of the artery (spontaneous coronary artery dissection).  · An enlargement of an artery (aneurysm).  · Inflammation (vasculitis) in the artery wall.  What increases the risk?  The following factors may make you more likely to develop this condition:  · Age. Men over age 45 are at a greater risk of CAD.  · Family history of CAD.  · Gender. Men often develop CAD earlier in life than women.  · High blood pressure (hypertension).  · Diabetes.  · High cholesterol levels.  · Tobacco use.  · Excessive alcohol use.  · Lack of exercise.  · A diet high in saturated and trans fats, such as fried food and processed meat.  Other possible risk factors include:  · High stress levels.  · Depression.  · Obesity.  · Sleep apnea.  What are the signs or symptoms?  Many people do not have any symptoms during the early stages of CAD. As the condition progresses, symptoms may include:  · Chest pain (angina). The pain can:  ? Feel  like crushing or squeezing, or like a tightness, pressure, fullness, or heaviness in the chest.  ? Last more than a few minutes or can stop and recur. The pain tends to get worse with exercise or stress and to fade with rest.  · Pain in the arms, neck, jaw, ear, or back.  · Unexplained heartburn or indigestion.  · Shortness of breath.  · Nausea or vomiting.  · Sudden light-headedness.  · Sudden cold sweats.  · Fluttering or fast heartbeat (palpitations).  How is this diagnosed?  This condition is diagnosed based on:  · Your family and medical history.  · A physical exam.  · Tests, including:  ? A test to check the electrical signals in your heart (electrocardiogram).  ? Exercise stress test. This looks for signs of blockage when the heart is stressed with exercise, such as running on a treadmill.  ? Pharmacologic stress test. This test looks for signs of blockage when the heart is being stressed with a medicine.  ? Blood tests.  ? Coronary angiogram. This is a procedure to look at the coronary arteries to see if there is any blockage. During this test, a dye is injected into your arteries so they appear on an X-ray.  ? Coronary artery CT scan. This CT scan helps detect calcium deposits in your coronary arteries. Calcium deposits are an indicator of CAD.  ? A test that uses sound waves to take a picture of your heart (echocardiogram).  ? Chest X-ray.  How is this treated?  This condition may be treated by:  · Healthy lifestyle changes to reduce risk factors.  · Medicines such as:  ? Antiplatelet medicines and blood-thinning medicines, such as aspirin. These help to prevent blood clots.  ? Nitroglycerin.  ? Blood pressure medicines.  ? Cholesterol-lowering medicine.  · Coronary angioplasty and stenting. During this procedure, a thin, flexible tube is inserted through a blood vessel and into a blocked artery. A balloon or similar device on the end of the tube is inflated to open up the artery. In some cases, a small,  mesh tube (stent) is inserted into the artery to keep it open.  · Coronary artery bypass surgery. During this surgery, veins or arteries from other parts of the body are used to create a bypass around the blockage and allow blood to reach your heart.  Follow these instructions at home:  Medicines  · Take over-the-counter and prescription medicines only as told by your health care provider.  · Do not take the following medicines unless your health care provider approves:  ? NSAIDs, such as ibuprofen, naproxen, or celecoxib.  ? Vitamin supplements that contain vitamin A, vitamin E, or both.  Lifestyle  · Follow an exercise program approved by your health care provider. Aim for 150 minutes of moderate exercise or 75 minutes of vigorous exercise each week.  · Maintain a healthy weight or lose weight as approved by your health care provider.  · Learn to manage stress or try to limit your stress. Ask your health care provider for suggestions if you need help.  · Get screened for depression and seek treatment, if needed.  · Do not use any products that contain nicotine or tobacco, such as cigarettes, e-cigarettes, and chewing tobacco. If you need help quitting, ask your health care provider.  · Do not use illegal drugs.  Eating and drinking    · Follow a heart-healthy diet. A dietitian can help educate you about healthy food options and changes. In general, eat plenty of fruits and vegetables, lean meats, and whole grains.  · Avoid foods high in:  ? Sugar.  ? Salt (sodium).  ? Saturated fat, such as processed or fatty meat.  ? Trans fat, such as fried foods.  · Use healthy cooking methods such as roasting, grilling, broiling, baking, poaching, steaming, or stir-frying.  · Do not drink alcohol if your health care provider tells you not to drink.  · If you drink alcohol:  ? Limit how much you have to 0-2 drinks per day.  ? Be aware of how much alcohol is in your drink. In the [place] one drink equals one 12 oz bottle of beer  (355 mL), one 5 oz glass of wine (148 mL), or one 1½ oz glass of hard liquor (44 mL).  General instructions  · Manage any other health conditions, such as hypertension and diabetes. These conditions affect your heart.  · Your health care provider may ask you to monitor your blood pressure. Ideally, your blood pressure should be below 130/80.  · Keep all follow-up visits as told by your health care provider. This is important.  Get help right away if:  · You have pain in your chest, neck, ear, arm, jaw, stomach, or back that:  ? Lasts more than a few minutes.  ? Is recurring.  ? Is not relieved by taking medicine under your tongue (sublingual nitroglycerin).  · You have profuse sweating without cause.  · You have unexplained:  ? Heartburn or indigestion.  ? Shortness of breath or difficulty breathing.  ? Fluttering or fast heartbeat (palpitations).  ? Nausea or vomiting.  ? Fatigue.  ? Feelings of nervousness or anxiety.  ? Weakness.  ? Diarrhea.  · You have sudden light-headedness or dizziness.  · You faint.  · You feel like hurting yourself or think about taking your own life.  These symptoms may represent a serious problem that is an emergency. Do not wait to see if the symptoms will go away. Get medical help right away. Call your local emergency services (911 in the [place] Do not drive yourself to the hospital.  Summary  · Coronary artery disease (CAD) is a condition in which the arteries that lead to the heart (coronary arteries) become narrow or blocked. The narrowing or blockage can lead to a heart attack.  · Many people do not have any symptoms during the early stages of CAD.  · CAD can be treated with lifestyle changes, medicines, surgery, or a combination of these treatments.  This information is not intended to replace advice given to you by your health care provider. Make sure you discuss any questions you have with your health care provider.  Document Revised: 09/06/2019 Document Reviewed:  08/27/2019  [employer] Patient Education © 2021 [employer]    Cardiomyopathy, Adult    Cardiomyopathy is a long-term (chronic) disease of the heart muscle. The disease makes the heart muscle thick, weak, or stiff. As a result, the heart works harder to pump blood. Over time, cardiomyopathy can lead to an irregular heartbeat (arrhythmia) and a condition in which the heart has trouble pumping blood (heart failure).  There are several types of cardiomyopathy. The kind of cardiomyopathy that you have depends on what part of the heart is affected and how it is affected.  What are the causes?  This condition may be caused by:  · A medical condition that damages the heart, such as diabetes, high blood pressure, or heart attack.  · Diseases of the immune system, connective tissues, or endocrine system.  · Alcohol abuse, use of illegal drugs, and taking certain medicines.  · Pregnancy.  · Too much iron in the body (hemochromatosis).  · Cancer treatments.  · Protein buildup in the organs or inflammation in the organs.  In some cases, the cause is not known.  What increases the risk?  You are more likely to develop this condition if:  · You have a gene that is passed down (inherited) from a family member.  · You are overweight or obese.  What are the signs or symptoms?  Often, people with this condition have no symptoms. If you do have symptoms, this may include:  · Shortness of breath, especially during activity.  · Fatigue.  · An arrhythmia.  · Feeling dizzy or light-headed, or fainting.  · Chest pain.  · Coughing.  · Swelling of the lower legs, feet, abdomen, or neck veins.  How is this diagnosed?  This condition is diagnosed based on:  · Your symptoms and medical history.  · A physical exam.  · Blood tests and imaging studies, such as X-ray, echocardiogram, or MRI.  · Other tests, such as:  ? An electrocardiogram (ECG).  ? A portable heart monitor that records your heart's electrical activity (event monitor).  ? A stress  test.  ? Cardiac catheterization and coronary angiogram.  ? Heart tissue biopsy.  ? An MRI of the heart.  How is this treated?  Your treatment depends on the type and severity of your symptoms. If you do not have symptoms, you may not need treatment. Treatment may include:  · General healthy lifestyle changes.  · Medicines to:  ? Treat high blood pressure, abnormal heart rate, or inflammation.  ? Clear excess fluids from your body.  ? Prevent blood clots.  ? Balance minerals (electrolytes) in your body and get rid of extra sodium in your body.  ? Strengthen your heartbeat.  · Surgery to:  ? Repair a heart defect, remove heart tissue, or destroy tissues in the area of abnormal electrical activity (ablation).  ? Implant a device to treat serious heart rhythm problems or to restore the heart's ability to pump blood, such as a pacemaker or a defibrillator.  ? Replace your heart with a healthy heart. This is done if all other treatments have failed.  Other treatment may include cardiac resynchronization therapy (CRT). This restores the heart's normal beating pattern.  Follow these instructions at home:  Medicines  · Take over-the-counter and prescription medicines only as told by your health care provider. Some medicines can be dangerous for your heart.  · If you are prescribed a blood thinner, make sure you understand what to do in a bleeding situation.  · Tell all health care providers, including your dentist, that you have cardiomyopathy. Ask your health care provider if you need antibiotics before having dental care or before surgery.  Lifestyle    · Eat a heart-healthy diet that includes plenty of fruits, vegetables, whole grains, and foods that are low in salt (sodium).  · Maintain a healthy weight.  · Stay physically active. Ask your health care provider what activities are safe for you.  · Do not use any products that contain nicotine or tobacco, such as cigarettes, e-cigarettes, and chewing tobacco. If you need  help quitting, ask your health care provider.  · Try to get at least 7 hours of sleep each night.  · Find healthy ways to manage stress.  Alcohol use  · Do not drink alcohol if:  ? Your health care provider tells you not to drink.  ? You are pregnant, may be pregnant, or are planning to become pregnant.  If you drink alcohol:  · Limit how much you use to:  ? 0-1 drink a day for women.  ? 0-2 drinks a day for men.  · Be aware of how much alcohol is in your drink. In the [place] one drink equals one 12 oz bottle of beer (355 mL), one 5 oz glass of wine (148 mL), or one 1½ oz glass of hard liquor (44 mL).  General instructions  · Ask your health care provider if you should wear a medical identification bracelet. This may be important if you have a pacemaker or a defibrillator.  · Get all needed vaccines. Get a flu shot every year.  · Work closely with your health care provider to manage any other chronic conditions.  · If you plan to start a family, talk with a genetic counselor to discuss the risk of having a child with cardiomyopathy.  · Keep all follow-up visits as told by your health care provider. This is important, even if you do not have any symptoms. Your health care provider may need to make sure your condition is not getting worse.  Where to find more information  · American Heart Association: www.heart.org  Contact a health care provider if:  · Your symptoms get worse.  · You have new symptoms.  Get help right away if you:  · Have severe chest pain.  · Have shortness of breath.  · Cough up a pink, bubbly substance.  · Feel nauseous and you vomit.  · Suddenly become light-headed or dizzy.  · Feel your heart beating very quickly.  · Feel like your heart is skipping beats.  These symptoms may represent a serious problem that is an emergency. Do not wait to see if the symptoms will go away. Get medical help right away. Call your local emergency services (911 in the [place] Do not drive yourself to the  [facility].  Summary  · Cardiomyopathy is a long-term (chronic) disease of the heart muscle.  · Over time, cardiomyopathy can lead to an irregular heartbeat (arrhythmia) and heart failure.  · A number of treatments are available for this condition. Your treatment will depend on the type and severity of your symptoms.  · Follow your health care provider's instructions on diet, medicines, physical activity, and when to seek help.  This information is not intended to replace advice given to you by your health care provider. Make sure you discuss any questions you have with your health care provider.  Document Revised: 09/23/2020 Document Reviewed: 09/23/2020  [employer] Patient Education © 2021 [employer]

## 2021-08-03 NOTE — PROGRESS NOTES
Subjective   Stone Villeda is a 46 y.o. male     Chief Complaint   Patient presents with   • Follow-up   • abnormal stress test       HPI    Problem list:    1.  CAD  1.1 stress test 6/3/2021-no evidence of ischemia, mild to moderately depressed post-rest EF of 46%  1.2 left heart cath 7/19/2020 21-50% narrowing in the proximal portion of the LAD, diffuse irregularities throughout the circumflex with 30 to 50% narrowing before the PDA, right coronary artery totally occluded just proximal to the acute margin, however FFR was 0.85.  EF 40 to 45%, LVEDP 12-14  2.  Hypertension  3.  Hyperlipidemia  4.  Palpitations  4.1 event monitor 6/10-6/23/2021-PVCs, NSVT (5 beat)  5.  Shortness of breath  5.1 echo 6/3/2021-EF 50 to 55%, moderate LVH, diastolic function 1  6.  Family history of early CAD; Brother first MI @ 38; DAD CABG @ 49  7.  DARON, CPAP  8.  Diabetes Mellitus II; recently dx 2021  9.  Smoking Habituation  10.  Renal artery ultrasound 6/21/2021-no hemodynamically significant renal artery stenosis    Patient is a 46-year-old male who presents today for follow-up status post left heart cath.  He denies any chest pain, pressure, palpitations, fluttering, presyncope, syncope, orthopnea or PND.  He says he has dizziness on occasion his blood pressure medicine because his blood pressure actually get a little low.  He says he only has swelling in his right leg where he has a history of fracture in both the ankle and the knee.  He does have shortness of breath but he thinks is more related to just being out of shape.  Patient does still smoke a pack a day.    We went over left heart cath, echo, event monitor and renal artery ultrasound.    Current Outpatient Medications on File Prior to Visit   Medication Sig Dispense Refill   • amLODIPine (Norvasc) 10 MG tablet Take 10 mg by mouth Daily.     • aspirin (aspirin) 81 MG EC tablet Take 1 tablet by mouth Daily. 90 tablet 3   • atorvastatin (LIPITOR) 20 MG tablet Take 1 tablet  by mouth Daily. 30 tablet 11   • carvedilol (COREG) 12.5 MG tablet Take 1 tablet by mouth 2 (Two) Times a Day With Meals. 60 tablet 11   • cloNIDine (Catapres) 0.1 MG tablet Take 1 tablet by mouth 3 (Three) Times a Day As Needed for High Blood Pressure (SBP > 160 or DBP > 90). 30 tablet 5   • clopidogrel (PLAVIX) 75 MG tablet Take 1 tablet by mouth Daily. 30 tablet 11   • gemfibrozil (Lopid) 600 MG tablet Take 600 mg by mouth 2 (Two) Times a Day Before Meals.     • hydroCHLOROthiazide (HYDRODIURIL) 12.5 MG tablet Take 12.5 mg by mouth Daily.     • metFORMIN (GLUCOPHAGE) 500 MG tablet Take 500 mg by mouth 2 (Two) Times a Day With Meals.     • nitroglycerin (NITROSTAT) 0.4 MG SL tablet 1 under the tongue as needed for angina, may repeat q5mins for up three doses 30 tablet 5   • [DISCONTINUED] losartan (COZAAR) 100 MG tablet Take 100 mg by mouth Daily.       No current facility-administered medications on file prior to visit.       ALLERGIES    Patient has no known allergies.    Past Medical History:   Diagnosis Date   • Diabetes mellitus (CMS/[facility])    • Hyperlipidemia    • Hypertension    • Sleep apnea        Social History     Socioeconomic History   • Marital status: Single     Spouse name: Not on file   • Number of children: Not on file   • Years of education: Not on file   • Highest education level: Not on file   Tobacco Use   • Smoking status: Current Every Day Smoker     Packs/day: 1.00     Years: 30.00     Pack years: 30.00     Types: Cigarettes   • Smokeless tobacco: Never Used   Substance and Sexual Activity   • Alcohol use: Never   • Drug use: Defer   • Sexual activity: Defer       Family History   Problem Relation Age of Onset   • Breast cancer Mother    • Hypertension Father    • Heart attack Father    • Heart attack Brother        Review of Systems   Constitutional: Negative for appetite change, chills, diaphoresis, fatigue and fever.   HENT: Negative for congestion, rhinorrhea and sore throat.    Eyes:  "Negative for visual disturbance.   Respiratory: Positive for shortness of breath (due to out of shape otherwise good ). Negative for cough, chest tightness and wheezing.    Cardiovascular: Positive for leg swelling (right ankle and knee h/o fx ). Negative for chest pain and palpitations.   Gastrointestinal: Negative for abdominal pain, blood in stool, constipation, diarrhea, nausea and vomiting.   Endocrine: Negative for cold intolerance and heat intolerance.   Genitourinary: Positive for frequency (going several times at night ). Negative for difficulty urinating, dysuria, hematuria and urgency.   Musculoskeletal: Positive for joint swelling (right knee ). Negative for arthralgias, back pain, neck pain and neck stiffness.   Skin: Negative for color change, pallor, rash and wound.   Allergic/Immunologic: Negative for environmental allergies and food allergies.   Neurological: Positive for dizziness (If he takes his BP meds everyday while working he gets dizziness so he takes it every other day ). Negative for weakness, light-headedness, numbness and headaches.   Hematological: Does not bruise/bleed easily.   Psychiatric/Behavioral: Negative for sleep disturbance.       Objective   /83 (BP Location: Left arm)   Pulse 78   Temp 97.9 °F (36.6 °C)   Ht 193 cm (76\")   Wt 131 kg (289 lb)   SpO2 97%   BMI 35.18 kg/m²   Vitals:    08/03/21 1325   BP: 132/83   BP Location: Left arm   Pulse: 78   Temp: 97.9 °F (36.6 °C)   SpO2: 97%   Weight: 131 kg (289 lb)   Height: 193 cm (76\")      Lab Results (most recent)     None        Physical Exam  Vitals reviewed.   Constitutional:       General: He is awake.      Appearance: Normal appearance. He is well-developed and well-groomed. He is obese.   HENT:      Head: Normocephalic.   Eyes:      General: Lids are normal.   Neck:      Vascular: No carotid bruit, hepatojugular reflux or JVD.   Cardiovascular:      Rate and Rhythm: Normal rate and regular rhythm.      Pulses:   "         Radial pulses are 2+ on the right side and 2+ on the left side.        Dorsalis pedis pulses are 2+ on the right side and 2+ on the left side.        Posterior tibial pulses are 2+ on the right side and 2+ on the left side.      Heart sounds: Normal heart sounds.   Pulmonary:      Effort: Pulmonary effort is normal.      Breath sounds: Normal air entry. Examination of the right-lower field reveals decreased breath sounds. Examination of the left-lower field reveals decreased breath sounds. Decreased breath sounds present.   Abdominal:      General: Bowel sounds are normal.      Palpations: Abdomen is soft.   Musculoskeletal:      Right lower leg: Edema (trace) present.      Left lower leg: Edema (trace) present.   Skin:     General: Skin is warm and dry.   Neurological:      Mental Status: He is alert and oriented to person, place, and time.   Psychiatric:         Attention and Perception: Attention and perception normal.         Mood and Affect: Mood and affect normal.         Speech: Speech normal.         Behavior: Behavior normal. Behavior is cooperative.         Thought Content: Thought content normal.         Cognition and Memory: Cognition and memory normal.         Judgment: Judgment normal.         Procedure   Procedures         Assessment/Plan      Diagnosis Plan   1. Coronary artery disease involving native coronary artery of native heart without angina pectoris  sacubitril-valsartan (ENTRESTO) 24-26 MG tablet   2. Essential hypertension  sacubitril-valsartan (ENTRESTO) 24-26 MG tablet   3. Hyperlipidemia, unspecified hyperlipidemia type     4. Family history of early CAD     5. Shortness of breath     6. Smoking     7. Ischemic cardiomyopathy  sacubitril-valsartan (ENTRESTO) 24-26 MG tablet       Return in about 12 weeks (around 10/26/2021).    CAD/hypertension/ischemic cardiomyopathy-stopping losartan and starting Entresto.  He will continue his carvedilol and his amlodipine.  He will continue  his hydrochlorothiazide for now.  Patient is on aspirin and statin.  Hyperlipidemia-patient's on Lipitor and doing well.  Family history of early CAD-patient is on aspirin and statin.  Shortness of breath-stable.  Smoking-encouraged cessation.  Patient will continue his medication regimen with above-noted change.  He will follow-up in 12 weeks or sooner if any changes.       Stone Villeda  reports that he has been smoking cigarettes. He has a 30.00 pack-year smoking history. He has never used smokeless tobacco. Patient did not bring med list or medicine bottles to appointment, med list has been reviewed and updated based on patient's knowledge of their meds.     Electronically signed by:

## 2021-10-05 ENCOUNTER — TELEPHONE (OUTPATIENT)
Dept: CARDIOLOGY | Facility: CLINIC | Age: 46
End: 2021-10-05

## 2021-10-05 ENCOUNTER — OFFICE VISIT (OUTPATIENT)
Dept: CARDIOLOGY | Facility: CLINIC | Age: 46
End: 2021-10-05

## 2021-10-05 VITALS
TEMPERATURE: 97.3 F | WEIGHT: 285 LBS | SYSTOLIC BLOOD PRESSURE: 152 MMHG | HEIGHT: 76 IN | HEART RATE: 87 BPM | BODY MASS INDEX: 34.7 KG/M2 | DIASTOLIC BLOOD PRESSURE: 100 MMHG | OXYGEN SATURATION: 98 %

## 2021-10-05 DIAGNOSIS — R42 DIZZINESS: ICD-10-CM

## 2021-10-05 DIAGNOSIS — I25.119 CORONARY ARTERY DISEASE INVOLVING NATIVE CORONARY ARTERY OF NATIVE HEART WITH ANGINA PECTORIS (HCC): ICD-10-CM

## 2021-10-05 DIAGNOSIS — I51.89 GRADE I DIASTOLIC DYSFUNCTION: ICD-10-CM

## 2021-10-05 DIAGNOSIS — I49.3 PVC (PREMATURE VENTRICULAR CONTRACTION): ICD-10-CM

## 2021-10-05 DIAGNOSIS — R07.89 OTHER CHEST PAIN: Primary | ICD-10-CM

## 2021-10-05 DIAGNOSIS — R53.83 FATIGUE, UNSPECIFIED TYPE: ICD-10-CM

## 2021-10-05 DIAGNOSIS — I25.10 CORONARY ARTERY DISEASE INVOLVING NATIVE CORONARY ARTERY OF NATIVE HEART WITHOUT ANGINA PECTORIS: ICD-10-CM

## 2021-10-05 DIAGNOSIS — R00.2 PALPITATIONS: ICD-10-CM

## 2021-10-05 DIAGNOSIS — I47.29 NSVT (NONSUSTAINED VENTRICULAR TACHYCARDIA) (HCC): ICD-10-CM

## 2021-10-05 DIAGNOSIS — R06.02 SHORTNESS OF BREATH: ICD-10-CM

## 2021-10-05 DIAGNOSIS — I10 ESSENTIAL HYPERTENSION: ICD-10-CM

## 2021-10-05 DIAGNOSIS — E78.5 HYPERLIPIDEMIA, UNSPECIFIED HYPERLIPIDEMIA TYPE: ICD-10-CM

## 2021-10-05 DIAGNOSIS — I25.5 ISCHEMIC CARDIOMYOPATHY: ICD-10-CM

## 2021-10-05 DIAGNOSIS — F17.200 SMOKING: ICD-10-CM

## 2021-10-05 PROCEDURE — 99214 OFFICE O/P EST MOD 30 MIN: CPT | Performed by: NURSE PRACTITIONER

## 2021-10-05 NOTE — PROGRESS NOTES
Subjective   Stone Villeda is a 46 y.o. male     Chief Complaint   Patient presents with   • Follow-up   • Coronary Artery Disease       HPI    Problem list:    1.  CAD  1.1 stress test 6/3/2021-no evidence of ischemia, mild to moderately depressed post-rest EF of 46%  1.2 left heart cath 7/19/2021 - 50% narrowing in the proximal portion of the LAD, diffuse irregularities throughout the circumflex with 30 to 50% narrowing before the PDA, right coronary artery totally occluded just proximal to the acute margin, however FFR was 0.85.  EF 40 to 45%, LVEDP 12-14  2.  Hypertension  3.  Hyperlipidemia  4.  Palpitations  4.1 event monitor 6/10-6/23/2021-PVCs, NSVT (5 beat)  5.  Shortness of breath  5.1 echo 6/3/2021-EF 50 to 55%, moderate LVH, diastolic function 1  6.  Family history of early CAD; Brother first MI @ 38; DAD CABG @ 49  7.  DARON, CPAP  8.  Diabetes Mellitus II; recently dx 2021  9.  Smoking Habituation  10.  Renal artery ultrasound 6/21/2021-no hemodynamically significant renal artery stenosis    Patient is a 46-year-old male who presents today for a follow-up.  He says that he did have some left anterior chest pressure and heaviness that radiated to his left arm.  He says his heart was fluttering.  He said he did not have any headaches and otherwise felt good at the time.  So he did not think his blood pressure was elevated.  He went to the ER because he was very concerned with his symptoms.  When he got to the ER his blood pressure was significantly elevated per his report.  He said they just did some test and sent him home.  Says since then his blood pressure seem to be doing okay.  He does still have some fluttering and racing at times when he sitting down and resting.  He does have dizziness one time when his blood pressure was low.  He denies any presyncope, syncope, orthopnea, PND or edema.  He denies any shortness of breath except for when he had his episode of chest discomfort.  He says the does have  some fatigue but he blames on his age.    Reviewed patient's records from [facility] looks like he potentially had Covid pneumonia as he had patchy groundglass opacities however he was not actually tested for Covid.  This was back in the beginning of September.  We will recommend him getting Covid antibody tested to see if he does have antibodies.    Current Outpatient Medications on File Prior to Visit   Medication Sig Dispense Refill   • amLODIPine (Norvasc) 10 MG tablet Take 10 mg by mouth Daily.     • aspirin (aspirin) 81 MG EC tablet Take 1 tablet by mouth Daily. 90 tablet 3   • atorvastatin (LIPITOR) 20 MG tablet Take 1 tablet by mouth Daily. 30 tablet 11   • carvedilol (COREG) 12.5 MG tablet Take 1 tablet by mouth 2 (Two) Times a Day With Meals. 60 tablet 11   • cloNIDine (Catapres) 0.1 MG tablet Take 1 tablet by mouth 3 (Three) Times a Day As Needed for High Blood Pressure (SBP > 160 or DBP > 90). 30 tablet 5   • clopidogrel (PLAVIX) 75 MG tablet Take 1 tablet by mouth Daily. 30 tablet 11   • hydroCHLOROthiazide (HYDRODIURIL) 12.5 MG tablet Take 12.5 mg by mouth Daily.     • metFORMIN (GLUCOPHAGE) 500 MG tablet Take 500 mg by mouth 2 (Two) Times a Day With Meals.     • nitroglycerin (NITROSTAT) 0.4 MG SL tablet 1 under the tongue as needed for angina, may repeat q5mins for up three doses 30 tablet 5   • [DISCONTINUED] sacubitril-valsartan (ENTRESTO) 24-26 MG tablet Take 1 tablet by mouth 2 (Two) Times a Day. 60 tablet 11   • gemfibrozil (Lopid) 600 MG tablet Take 600 mg by mouth 2 (Two) Times a Day Before Meals.       No current facility-administered medications on file prior to visit.       ALLERGIES    Patient has no known allergies.    Past Medical History:   Diagnosis Date   • Diabetes mellitus (HCC)    • Hyperlipidemia    • Hypertension    • Sleep apnea        Social History     Socioeconomic History   • Marital status: Single     Spouse name: Not on file   • Number of children: Not on file    • Years of education: Not on file   • Highest education level: Not on file   Tobacco Use   • Smoking status: Current Every Day Smoker     Packs/day: 0.50     Years: 30.00     Pack years: 15.00     Types: Cigarettes   • Smokeless tobacco: Never Used   Substance and Sexual Activity   • Alcohol use: Never   • Drug use: Defer   • Sexual activity: Defer       Family History   Problem Relation Age of Onset   • Breast cancer Mother    • Hypertension Father    • Heart attack Father    • Heart attack Brother        Review of Systems   Constitutional: Positive for fatigue (says it is because of age ). Negative for appetite change, chills, diaphoresis and fever.   HENT: Negative for congestion, rhinorrhea and sore throat.    Eyes: Negative for visual disturbance.   Respiratory: Positive for chest tightness (around the heart , pressure he states it did not feel right so he went to the [facility] ) and shortness of breath (with CP ). Negative for cough and wheezing.    Cardiovascular: Positive for chest pain (left anterior pressure/heaviness rad into left arm; heart was fluttering, did not have any headaches and otherwise had felt good ) and palpitations (fluttering , heart races and pounds , ( he was watching TV); notices them when resting ). Negative for leg swelling.   Gastrointestinal: Negative for abdominal pain, blood in stool, constipation, diarrhea, nausea and vomiting.   Endocrine: Negative for cold intolerance and heat intolerance.   Genitourinary: Negative for difficulty urinating, dysuria, frequency, hematuria and urgency.   Musculoskeletal: Positive for joint swelling (right knee,). Negative for arthralgias, back pain, neck pain and neck stiffness.   Skin: Negative for color change, pallor, rash and wound.   Allergic/Immunologic: Negative for environmental allergies and food allergies.   Neurological: Positive for dizziness (when Bp gets to low ), numbness (left arm numbness over a month ago due to increased BP   "he went to ER in [facility] ) and headaches (pt states that he has H/SA now and he has seen floaters before and knew his Bp was high ( Bp ) on each arm is 10 pts different  252/112). Negative for weakness and light-headedness.   Hematological: Does not bruise/bleed easily.   Psychiatric/Behavioral: Positive for sleep disturbance (hard to go and hard to stay asleep , he tosses and turns all night ).       Objective   /100 (BP Location: Right arm, Patient Position: Sitting)   Pulse 87   Temp 97.3 °F (36.3 °C)   Ht 193 cm (76\")   Wt 129 kg (285 lb)   SpO2 98%   BMI 34.69 kg/m²   Vitals:    10/05/21 1400 10/05/21 1410 10/05/21 1411 10/05/21 1441   BP: 160/100 (!) 160/108 (!) 170/110 152/100   BP Location: Left arm Right arm Left arm Right arm   Patient Position:    Sitting   Pulse: 87      Temp: 97.3 °F (36.3 °C)      SpO2: 98%      Weight: 129 kg (285 lb)      Height: 193 cm (76\")         Lab Results (most recent)     None        Physical Exam  Vitals reviewed.   Constitutional:       General: He is awake.      Appearance: Normal appearance. He is well-developed and well-groomed. He is obese.   HENT:      Head: Normocephalic.   Eyes:      General: Lids are normal.   Neck:      Vascular: No carotid bruit, hepatojugular reflux or JVD.   Cardiovascular:      Rate and Rhythm: Normal rate and regular rhythm.      Pulses:           Radial pulses are 2+ on the right side and 2+ on the left side.        Dorsalis pedis pulses are 2+ on the right side and 2+ on the left side.        Posterior tibial pulses are 2+ on the right side and 2+ on the left side.      Heart sounds: Normal heart sounds.   Pulmonary:      Effort: Pulmonary effort is normal.      Breath sounds: Normal breath sounds and air entry.   Abdominal:      General: Bowel sounds are normal.      Palpations: Abdomen is soft.   Musculoskeletal:      Right lower leg: No edema.      Left lower leg: No edema.   Skin:     General: Skin is warm and dry. "   Neurological:      Mental Status: He is alert and oriented to person, place, and time.   Psychiatric:         Attention and Perception: Attention and perception normal.         Mood and Affect: Mood and affect normal.         Speech: Speech normal.         Behavior: Behavior normal. Behavior is cooperative.         Thought Content: Thought content normal.         Cognition and Memory: Cognition and memory normal.         Judgment: Judgment normal.         Procedure   Procedures         Assessment/Plan      Diagnosis Plan   1. Other chest pain  Stress Test With Myocardial Perfusion One Day    Adult Transthoracic Echo Complete W/ Cont if Necessary Per Protocol   2. Essential hypertension  Stress Test With Myocardial Perfusion One Day    Adult Transthoracic Echo Complete W/ Cont if Necessary Per Protocol   3. Hyperlipidemia, unspecified hyperlipidemia type  Stress Test With Myocardial Perfusion One Day   4. Shortness of breath     5. Smoking  Adult Transthoracic Echo Complete W/ Cont if Necessary Per Protocol   6. PVC (premature ventricular contraction)  Stress Test With Myocardial Perfusion One Day    Adult Transthoracic Echo Complete W/ Cont if Necessary Per Protocol   7. NSVT (nonsustained ventricular tachycardia) (formerly [facility])  Stress Test With Myocardial Perfusion One Day    Adult Transthoracic Echo Complete W/ Cont if Necessary Per Protocol   8. Grade I diastolic dysfunction  Adult Transthoracic Echo Complete W/ Cont if Necessary Per Protocol    sacubitril-valsartan (ENTRESTO) 49-51 MG tablet   9. 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 pectoris (HCC)  Stress Test With Myocardial Perfusion One Day   10. Dizziness     11. Coronary artery disease involving native coronary artery of native heart without angina pectoris  Adult Transthoracic Echo Complete W/ Cont if Necessary Per Protocol   12. Ischemic cardiomyopathy  Stress Test With Myocardial Perfusion One Day    Adult  Transthoracic Echo Complete W/ Cont if Necessary Per Protocol    sacubitril-valsartan (ENTRESTO) 49-51 MG tablet       Return in about 12 weeks (around 12/28/2021).    Chest pain/hypertension/hyperlipidemia/shortness of breath/smoking/PVC/NSVT/diastolic function 1 CAD/ischemic cardiomyopathy-patient will have an ischemia work-up, stress and echo.  He was encouraged to use nitro as needed for chest pain no resolution to go to the ER.  I am going to increase his Entresto to 49/51 since his blood pressure typically runs in the 130 systolic.  Patient will continue his medication regimen otherwise.  He will follow-up in 12 weeks or sooner if any changes or abnormalities with testing.       Stone Villeda  reports that he has been smoking cigarettes. He has a 15.00 pack-year smoking history. He has never used smokeless tobacco..Electronically signed by:

## 2021-10-05 NOTE — TELEPHONE ENCOUNTER
Tried calling pt to advised that according tp [facility] records. Pt had Covid Pneu and we will try to get ahold of pt to see if pt will get labs to check for Covid Antibodies .    Lab ordered faxed and called to PCP to give a verbal since lab order did not go through     [phone] Fax #     Spoke with Shannon with a verbal for labs     ETIENNE Logan

## 2021-10-12 ENCOUNTER — APPOINTMENT (OUTPATIENT)
Dept: CARDIOLOGY | Facility: HOSPITAL | Age: 46
End: 2021-10-12

## 2021-10-26 ENCOUNTER — HOSPITAL ENCOUNTER (OUTPATIENT)
Dept: CARDIOLOGY | Facility: HOSPITAL | Age: 46
Discharge: HOME | End: 2021-10-26

## 2021-10-26 DIAGNOSIS — I10 ESSENTIAL HYPERTENSION: ICD-10-CM

## 2021-10-26 DIAGNOSIS — I47.29 NSVT (NONSUSTAINED VENTRICULAR TACHYCARDIA) (HCC): ICD-10-CM

## 2021-10-26 DIAGNOSIS — I25.5 ISCHEMIC CARDIOMYOPATHY: ICD-10-CM

## 2021-10-26 DIAGNOSIS — F17.200 SMOKING: ICD-10-CM

## 2021-10-26 DIAGNOSIS — R07.89 OTHER CHEST PAIN: ICD-10-CM

## 2021-10-26 DIAGNOSIS — I51.89 GRADE I DIASTOLIC DYSFUNCTION: ICD-10-CM

## 2021-10-26 DIAGNOSIS — I25.119 CORONARY ARTERY DISEASE INVOLVING NATIVE CORONARY ARTERY OF NATIVE HEART WITH ANGINA PECTORIS (HCC): ICD-10-CM

## 2021-10-26 DIAGNOSIS — E78.5 HYPERLIPIDEMIA, UNSPECIFIED HYPERLIPIDEMIA TYPE: ICD-10-CM

## 2021-10-26 DIAGNOSIS — I25.10 CORONARY ARTERY DISEASE INVOLVING NATIVE CORONARY ARTERY OF NATIVE HEART WITHOUT ANGINA PECTORIS: ICD-10-CM

## 2021-10-26 DIAGNOSIS — I49.3 PVC (PREMATURE VENTRICULAR CONTRACTION): ICD-10-CM

## 2021-10-26 PROCEDURE — 78452 HT MUSCLE IMAGE SPECT MULT: CPT | Performed by: INTERNAL MEDICINE

## 2021-10-26 PROCEDURE — 78452 HT MUSCLE IMAGE SPECT MULT: CPT

## 2021-10-26 PROCEDURE — 25010000002 REGADENOSON 0.4 MG/5ML SOLUTION: Performed by: INTERNAL MEDICINE

## 2021-10-26 PROCEDURE — 93306 TTE W/DOPPLER COMPLETE: CPT | Performed by: INTERNAL MEDICINE

## 2021-10-26 PROCEDURE — 0 TECHNETIUM SESTAMIBI: Performed by: INTERNAL MEDICINE

## 2021-10-26 PROCEDURE — 93017 CV STRESS TEST TRACING ONLY: CPT

## 2021-10-26 PROCEDURE — A9500 TC99M SESTAMIBI: HCPCS | Performed by: INTERNAL MEDICINE

## 2021-10-26 PROCEDURE — 93306 TTE W/DOPPLER COMPLETE: CPT

## 2021-10-26 PROCEDURE — 93018 CV STRESS TEST I&R ONLY: CPT | Performed by: INTERNAL MEDICINE

## 2021-10-26 RX ADMIN — TECHNETIUM TC 99M SESTAMIBI 1 DOSE: 1 INJECTION INTRAVENOUS at 11:32

## 2021-10-26 RX ADMIN — REGADENOSON 0.4 MG: 0.08 INJECTION, SOLUTION INTRAVENOUS at 11:32

## 2021-10-26 RX ADMIN — TECHNETIUM TC 99M SESTAMIBI 1 DOSE: 1 INJECTION INTRAVENOUS at 09:04

## 2021-10-27 LAB
BH CV REST NUCLEAR ISOTOPE DOSE: 10 MCI
BH CV STRESS COMMENTS STAGE 1: NORMAL
BH CV STRESS DOSE REGADENOSON STAGE 1: 0.4
BH CV STRESS DURATION MIN STAGE 1: 0
BH CV STRESS DURATION SEC STAGE 1: 10
BH CV STRESS NUCLEAR ISOTOPE DOSE: 30 MCI
BH CV STRESS PROTOCOL 1: NORMAL
BH CV STRESS RECOVERY BP: NORMAL MMHG
BH CV STRESS RECOVERY HR: 89 BPM
BH CV STRESS STAGE 1: 1
MAXIMAL PREDICTED HEART RATE: 175 BPM
PERCENT MAX PREDICTED HR: 49.14 %
STRESS BASELINE BP: NORMAL MMHG
STRESS BASELINE HR: 75 BPM
STRESS PERCENT HR: 58 %
STRESS POST PEAK BP: NORMAL MMHG
STRESS POST PEAK HR: 86 BPM
STRESS TARGET HR: 149 BPM

## 2021-10-29 ENCOUNTER — TELEPHONE (OUTPATIENT)
Dept: CARDIOLOGY | Facility: CLINIC | Age: 46
End: 2021-10-29

## 2021-11-06 LAB
BH CV ECHO MEAS - ACS: 2.1 CM
BH CV ECHO MEAS - AO MAX PG: 6 MMHG
BH CV ECHO MEAS - AO MEAN PG: 4 MMHG
BH CV ECHO MEAS - AO ROOT AREA (BSA CORRECTED): 1.2
BH CV ECHO MEAS - AO ROOT AREA: 7.8 CM^2
BH CV ECHO MEAS - AO ROOT DIAM: 3.2 CM
BH CV ECHO MEAS - AO V2 MAX: 122 CM/SEC
BH CV ECHO MEAS - AO V2 MEAN: 88 CM/SEC
BH CV ECHO MEAS - AO V2 VTI: 26.1 CM
BH CV ECHO MEAS - BSA(HAYCOCK): 2.7 M^2
BH CV ECHO MEAS - BSA: 2.6 M^2
BH CV ECHO MEAS - BZI_BMI: 34.7 KILOGRAMS/M^2
BH CV ECHO MEAS - BZI_METRIC_HEIGHT: 193 CM
BH CV ECHO MEAS - BZI_METRIC_WEIGHT: 129.3 KG
BH CV ECHO MEAS - EDV(CUBED): 45.9 ML
BH CV ECHO MEAS - EDV(MOD-SP4): 154 ML
BH CV ECHO MEAS - EDV(TEICH): 53.7 ML
BH CV ECHO MEAS - EF(CUBED): 63.9 %
BH CV ECHO MEAS - EF(MOD-SP4): 64.5 %
BH CV ECHO MEAS - EF(TEICH): 56.3 %
BH CV ECHO MEAS - EF_3D-VOL: 73 %
BH CV ECHO MEAS - ESV(CUBED): 16.6 ML
BH CV ECHO MEAS - ESV(MOD-SP4): 54.7 ML
BH CV ECHO MEAS - ESV(TEICH): 23.4 ML
BH CV ECHO MEAS - FS: 28.8 %
BH CV ECHO MEAS - IVS/LVPW: 1
BH CV ECHO MEAS - IVSD: 2 CM
BH CV ECHO MEAS - LA DIMENSION: 4.4 CM
BH CV ECHO MEAS - LA/AO: 1.4
BH CV ECHO MEAS - LV DIASTOLIC VOL/BSA (35-75): 59.8 ML/M^2
BH CV ECHO MEAS - LV IVRT: 0.11 SEC
BH CV ECHO MEAS - LV MASS(C)D: 310.6 GRAMS
BH CV ECHO MEAS - LV MASS(C)DI: 120.6 GRAMS/M^2
BH CV ECHO MEAS - LV SYSTOLIC VOL/BSA (12-30): 21.2 ML/M^2
BH CV ECHO MEAS - LVIDD: 3.6 CM
BH CV ECHO MEAS - LVIDS: 2.6 CM
BH CV ECHO MEAS - LVLD AP4: 10.1 CM
BH CV ECHO MEAS - LVLS AP4: 7.7 CM
BH CV ECHO MEAS - LVOT AREA (M): 4.5 CM^2
BH CV ECHO MEAS - LVOT AREA: 4.5 CM^2
BH CV ECHO MEAS - LVOT DIAM: 2.4 CM
BH CV ECHO MEAS - LVPWD: 2 CM
BH CV ECHO MEAS - MV A MAX VEL: 60.4 CM/SEC
BH CV ECHO MEAS - MV DEC SLOPE: 204 CM/SEC^2
BH CV ECHO MEAS - MV E MAX VEL: 53.6 CM/SEC
BH CV ECHO MEAS - MV E/A: 0.89
BH CV ECHO MEAS - RVDD: 3.6 CM
BH CV ECHO MEAS - SI(AO): 79 ML/M^2
BH CV ECHO MEAS - SI(CUBED): 11.4 ML/M^2
BH CV ECHO MEAS - SI(MOD-SP4): 38.6 ML/M^2
BH CV ECHO MEAS - SI(TEICH): 11.7 ML/M^2
BH CV ECHO MEAS - SV(AO): 203.4 ML
BH CV ECHO MEAS - SV(CUBED): 29.3 ML
BH CV ECHO MEAS - SV(MOD-SP4): 99.3 ML
BH CV ECHO MEAS - SV(TEICH): 30.3 ML
MAXIMAL PREDICTED HEART RATE: 175 BPM
STRESS TARGET HR: 149 BPM

## 2021-11-08 ENCOUNTER — TELEPHONE (OUTPATIENT)
Dept: CARDIOLOGY | Facility: CLINIC | Age: 46
End: 2021-11-08

## 2021-11-09 ENCOUNTER — APPOINTMENT (OUTPATIENT)
Dept: CARDIOLOGY | Facility: HOSPITAL | Age: 46
End: 2021-11-09

## 2022-03-31 ENCOUNTER — OFFICE VISIT (OUTPATIENT)
Dept: CARDIOLOGY | Facility: CLINIC | Age: 47
End: 2022-03-31

## 2022-03-31 ENCOUNTER — LAB (OUTPATIENT)
Dept: CARDIOLOGY | Facility: CLINIC | Age: 47
End: 2022-03-31

## 2022-03-31 VITALS
WEIGHT: 268 LBS | SYSTOLIC BLOOD PRESSURE: 148 MMHG | HEART RATE: 75 BPM | BODY MASS INDEX: 32.63 KG/M2 | TEMPERATURE: 97.2 F | OXYGEN SATURATION: 97 % | DIASTOLIC BLOOD PRESSURE: 82 MMHG | HEIGHT: 76 IN

## 2022-03-31 DIAGNOSIS — I10 ESSENTIAL HYPERTENSION: ICD-10-CM

## 2022-03-31 DIAGNOSIS — E78.5 HYPERLIPIDEMIA, UNSPECIFIED HYPERLIPIDEMIA TYPE: ICD-10-CM

## 2022-03-31 DIAGNOSIS — Z12.5 SCREENING PSA (PROSTATE SPECIFIC ANTIGEN): ICD-10-CM

## 2022-03-31 DIAGNOSIS — I25.5 ISCHEMIC CARDIOMYOPATHY: ICD-10-CM

## 2022-03-31 DIAGNOSIS — E11.9 TYPE 2 DIABETES MELLITUS WITHOUT COMPLICATION, WITHOUT LONG-TERM CURRENT USE OF INSULIN: ICD-10-CM

## 2022-03-31 DIAGNOSIS — R60.9 PERIPHERAL EDEMA: ICD-10-CM

## 2022-03-31 DIAGNOSIS — I51.89 GRADE I DIASTOLIC DYSFUNCTION: ICD-10-CM

## 2022-03-31 DIAGNOSIS — R06.02 SHORTNESS OF BREATH: ICD-10-CM

## 2022-03-31 DIAGNOSIS — R94.39 ABNORMAL STRESS TEST: ICD-10-CM

## 2022-03-31 DIAGNOSIS — F17.200 SMOKING: ICD-10-CM

## 2022-03-31 DIAGNOSIS — I25.119 CORONARY ARTERY DISEASE INVOLVING NATIVE CORONARY ARTERY OF NATIVE HEART WITH ANGINA PECTORIS: Primary | ICD-10-CM

## 2022-03-31 DIAGNOSIS — I47.29 NSVT (NONSUSTAINED VENTRICULAR TACHYCARDIA): ICD-10-CM

## 2022-03-31 DIAGNOSIS — E11.9 TYPE 2 DIABETES MELLITUS WITHOUT COMPLICATION, WITHOUT LONG-TERM CURRENT USE OF INSULIN: Primary | ICD-10-CM

## 2022-03-31 DIAGNOSIS — I49.3 PVC (PREMATURE VENTRICULAR CONTRACTION): ICD-10-CM

## 2022-03-31 PROBLEM — R60.0 PERIPHERAL EDEMA: Status: ACTIVE | Noted: 2022-03-31

## 2022-03-31 LAB
ALBUMIN SERPL-MCNC: 4.57 G/DL (ref 3.5–5.2)
ALBUMIN/GLOB SERPL: 1.8 G/DL
ALP SERPL-CCNC: 112 U/L (ref 39–117)
ALT SERPL W P-5'-P-CCNC: 12 U/L (ref 1–41)
ANION GAP SERPL CALCULATED.3IONS-SCNC: 14.1 MMOL/L (ref 5–15)
AST SERPL-CCNC: 13 U/L (ref 1–40)
BASOPHILS # BLD AUTO: 0.06 10*3/MM3 (ref 0–0.2)
BASOPHILS NFR BLD AUTO: 0.5 % (ref 0–1.5)
BILIRUB SERPL-MCNC: 0.5 MG/DL (ref 0–1.2)
BUN SERPL-MCNC: 14 MG/DL (ref 6–20)
BUN/CREAT SERPL: 13.5 (ref 7–25)
CALCIUM SPEC-SCNC: 9.6 MG/DL (ref 8.6–10.5)
CHLORIDE SERPL-SCNC: 99 MMOL/L (ref 98–107)
CHOLEST SERPL-MCNC: 237 MG/DL (ref 0–200)
CO2 SERPL-SCNC: 22.9 MMOL/L (ref 22–29)
CREAT SERPL-MCNC: 1.04 MG/DL (ref 0.76–1.27)
DEPRECATED RDW RBC AUTO: 45.3 FL (ref 37–54)
EGFRCR SERPLBLD CKD-EPI 2021: 90.2 ML/MIN/1.73
EOSINOPHIL # BLD AUTO: 0.22 10*3/MM3 (ref 0–0.4)
EOSINOPHIL NFR BLD AUTO: 2 % (ref 0.3–6.2)
ERYTHROCYTE [DISTWIDTH] IN BLOOD BY AUTOMATED COUNT: 13.8 % (ref 12.3–15.4)
GLOBULIN UR ELPH-MCNC: 2.5 GM/DL
GLUCOSE SERPL-MCNC: 134 MG/DL (ref 65–99)
HBA1C MFR BLD: 6.5 % (ref 4.8–5.6)
HCT VFR BLD AUTO: 53.3 % (ref 37.5–51)
HDLC SERPL-MCNC: 33 MG/DL (ref 40–60)
HGB BLD-MCNC: 17.2 G/DL (ref 13–17.7)
IMM GRANULOCYTES # BLD AUTO: 0.03 10*3/MM3 (ref 0–0.05)
IMM GRANULOCYTES NFR BLD AUTO: 0.3 % (ref 0–0.5)
LDLC SERPL CALC-MCNC: 159 MG/DL (ref 0–100)
LDLC/HDLC SERPL: 4.72 {RATIO}
LYMPHOCYTES # BLD AUTO: 3.02 10*3/MM3 (ref 0.7–3.1)
LYMPHOCYTES NFR BLD AUTO: 27.6 % (ref 19.6–45.3)
MCH RBC QN AUTO: 29 PG (ref 26.6–33)
MCHC RBC AUTO-ENTMCNC: 32.3 G/DL (ref 31.5–35.7)
MCV RBC AUTO: 89.9 FL (ref 79–97)
MONOCYTES # BLD AUTO: 1.05 10*3/MM3 (ref 0.1–0.9)
MONOCYTES NFR BLD AUTO: 9.6 % (ref 5–12)
NEUTROPHILS NFR BLD AUTO: 6.57 10*3/MM3 (ref 1.7–7)
NEUTROPHILS NFR BLD AUTO: 60 % (ref 42.7–76)
NRBC BLD AUTO-RTO: 0 /100 WBC (ref 0–0.2)
PLATELET # BLD AUTO: 266 10*3/MM3 (ref 140–450)
PMV BLD AUTO: 10.9 FL (ref 6–12)
POTASSIUM SERPL-SCNC: 4.9 MMOL/L (ref 3.5–5.2)
PROT SERPL-MCNC: 7.1 G/DL (ref 6–8.5)
RBC # BLD AUTO: 5.93 10*6/MM3 (ref 4.14–5.8)
SODIUM SERPL-SCNC: 136 MMOL/L (ref 136–145)
T4 FREE SERPL-MCNC: 1.41 NG/DL (ref 0.93–1.7)
TRIGL SERPL-MCNC: 241 MG/DL (ref 0–150)
TSH SERPL DL<=0.05 MIU/L-ACNC: 2.14 UIU/ML (ref 0.27–4.2)
VLDLC SERPL-MCNC: 45 MG/DL (ref 5–40)
WBC NRBC COR # BLD: 10.95 10*3/MM3 (ref 3.4–10.8)

## 2022-03-31 PROCEDURE — 84481 FREE ASSAY (FT-3): CPT | Performed by: NURSE PRACTITIONER

## 2022-03-31 PROCEDURE — 85025 COMPLETE CBC W/AUTO DIFF WBC: CPT | Performed by: NURSE PRACTITIONER

## 2022-03-31 PROCEDURE — 83036 HEMOGLOBIN GLYCOSYLATED A1C: CPT | Performed by: NURSE PRACTITIONER

## 2022-03-31 PROCEDURE — G0103 PSA SCREENING: HCPCS | Performed by: NURSE PRACTITIONER

## 2022-03-31 PROCEDURE — 36415 COLL VENOUS BLD VENIPUNCTURE: CPT

## 2022-03-31 PROCEDURE — 84439 ASSAY OF FREE THYROXINE: CPT | Performed by: NURSE PRACTITIONER

## 2022-03-31 PROCEDURE — 80053 COMPREHEN METABOLIC PANEL: CPT | Performed by: NURSE PRACTITIONER

## 2022-03-31 PROCEDURE — 80061 LIPID PANEL: CPT | Performed by: NURSE PRACTITIONER

## 2022-03-31 PROCEDURE — 84443 ASSAY THYROID STIM HORMONE: CPT | Performed by: NURSE PRACTITIONER

## 2022-03-31 PROCEDURE — 99214 OFFICE O/P EST MOD 30 MIN: CPT | Performed by: NURSE PRACTITIONER

## 2022-03-31 RX ORDER — NITROGLYCERIN 0.4 MG/1
TABLET SUBLINGUAL
Qty: 30 TABLET | Refills: 2 | Status: SHIPPED | OUTPATIENT
Start: 2022-03-31

## 2022-03-31 RX ORDER — CARVEDILOL 25 MG/1
25 TABLET ORAL 2 TIMES DAILY WITH MEALS
Qty: 60 TABLET | Refills: 11 | Status: CANCELLED | OUTPATIENT
Start: 2022-03-31

## 2022-03-31 RX ORDER — CLONIDINE HYDROCHLORIDE 0.1 MG/1
0.1 TABLET ORAL 3 TIMES DAILY PRN
Qty: 30 TABLET | Refills: 5 | Status: SHIPPED | OUTPATIENT
Start: 2022-03-31

## 2022-03-31 RX ORDER — ISOSORBIDE MONONITRATE 30 MG/1
15 TABLET, EXTENDED RELEASE ORAL DAILY
Qty: 30 TABLET | Refills: 11 | Status: SHIPPED | OUTPATIENT
Start: 2022-03-31 | End: 2022-12-30 | Stop reason: HOSPADM

## 2022-04-01 ENCOUNTER — TELEPHONE (OUTPATIENT)
Dept: CARDIOLOGY | Facility: CLINIC | Age: 47
End: 2022-04-01

## 2022-04-01 DIAGNOSIS — I25.119 CORONARY ARTERY DISEASE INVOLVING NATIVE CORONARY ARTERY OF NATIVE HEART WITH ANGINA PECTORIS: ICD-10-CM

## 2022-04-01 DIAGNOSIS — E78.5 HYPERLIPIDEMIA, UNSPECIFIED HYPERLIPIDEMIA TYPE: ICD-10-CM

## 2022-04-01 DIAGNOSIS — I10 ESSENTIAL HYPERTENSION: Primary | ICD-10-CM

## 2022-04-01 DIAGNOSIS — Z82.49 FAMILY HISTORY OF EARLY CAD: ICD-10-CM

## 2022-04-01 LAB
PSA SERPL-MCNC: 0.94 NG/ML (ref 0–4)
T3FREE SERPL-MCNC: 4.02 PG/ML (ref 2–4.4)

## 2022-04-01 RX ORDER — ROSUVASTATIN CALCIUM 20 MG/1
20 TABLET, COATED ORAL DAILY
Qty: 90 TABLET | Refills: 3 | Status: SHIPPED | OUTPATIENT
Start: 2022-04-01

## 2022-04-01 NOTE — TELEPHONE ENCOUNTER
----- Message from ELEANOR Mendoza sent at 4/1/2022  6:28 AM EDT -----  Please advise A1C still diabetic.  Also PSA and FT3 good. Forwarded to PCP.      Pt was advised of lab results , he states that he has not been taking the Chol meds but he has plenty of meds , he states that its causes cramps and would like to try something else .    Per ELEANOR Carl     We will change to Crestor 20 mg and try that he will repeat labs here in 6 weeks . Medication sent to [facility] and labs ordered     Hemoglobin A1C   4.80 - 5.60 % 6.50 High      Glucose   65 - 99 mg/dL 134 High       Triglycerides   0 - 150 mg/dL 241 High     HDL Cholesterol   40 - 60 mg/dL 33 Low     LDL Cholesterol    0 - 100 mg/dL 159 High      WBC   3.40 - 10.80 10*3/mm3 10.95 High       ETIENNE Logan

## 2022-04-04 ENCOUNTER — TELEPHONE (OUTPATIENT)
Dept: CARDIOLOGY | Facility: CLINIC | Age: 47
End: 2022-04-04

## 2022-04-04 NOTE — TELEPHONE ENCOUNTER
----- Message from ELEANOR Mendoza sent at 4/4/2022 10:26 AM EDT -----  Shruthi can you please call patient and let him know that I spoke with Dr. Nicholson.  The stress test was just indicating that there was no infarct/heart attack in the area.  So no changes unless he starts having any issues to contact us and let us know.  Thank you

## 2022-04-04 NOTE — TELEPHONE ENCOUNTER
Spoke with pt and he states he is having no issues, will contact our facility is needed.      JOLYNN ROTHMAN MA     Surgery orders placed for procedure on 9/25    Procedure: Exam under anesthesia, colposcopy, CO2 Laser of Vagina and cervix    Indication: Cervical and vaginal dysplasia    Will need PAC for preop, consent day of surgery    Pati Garcia MD  Gynecologic Oncology  [facility]

## 2022-06-09 DIAGNOSIS — I10 ESSENTIAL HYPERTENSION: ICD-10-CM

## 2022-06-09 RX ORDER — CARVEDILOL 12.5 MG/1
12.5 TABLET ORAL 2 TIMES DAILY WITH MEALS
Qty: 60 TABLET | Refills: 11 | Status: SHIPPED | OUTPATIENT
Start: 2022-06-09 | End: 2022-11-17 | Stop reason: SINTOL

## 2022-06-09 NOTE — TELEPHONE ENCOUNTER
RF- Request for Lipitor 20 mg  - declined     [facility]  ETIENNE Logan    RF - Request for Coreg 12.5 mg BID     [facility]

## 2022-07-15 ENCOUNTER — OFFICE VISIT (OUTPATIENT)
Dept: CARDIOLOGY | Facility: CLINIC | Age: 47
End: 2022-07-15

## 2022-07-15 VITALS
DIASTOLIC BLOOD PRESSURE: 85 MMHG | TEMPERATURE: 97.6 F | SYSTOLIC BLOOD PRESSURE: 158 MMHG | HEART RATE: 78 BPM | BODY MASS INDEX: 30.69 KG/M2 | HEIGHT: 76 IN | WEIGHT: 252 LBS | OXYGEN SATURATION: 99 %

## 2022-07-15 DIAGNOSIS — R60.9 PERIPHERAL EDEMA: ICD-10-CM

## 2022-07-15 DIAGNOSIS — I47.29 NSVT (NONSUSTAINED VENTRICULAR TACHYCARDIA): ICD-10-CM

## 2022-07-15 DIAGNOSIS — I49.3 PVC (PREMATURE VENTRICULAR CONTRACTION): ICD-10-CM

## 2022-07-15 DIAGNOSIS — R06.02 SHORTNESS OF BREATH: ICD-10-CM

## 2022-07-15 DIAGNOSIS — I25.119 CORONARY ARTERY DISEASE INVOLVING NATIVE CORONARY ARTERY OF NATIVE HEART WITH ANGINA PECTORIS: Primary | ICD-10-CM

## 2022-07-15 DIAGNOSIS — F17.200 SMOKING: ICD-10-CM

## 2022-07-15 DIAGNOSIS — I51.89 GRADE I DIASTOLIC DYSFUNCTION: ICD-10-CM

## 2022-07-15 DIAGNOSIS — R00.2 PALPITATIONS: ICD-10-CM

## 2022-07-15 DIAGNOSIS — E78.5 HYPERLIPIDEMIA, UNSPECIFIED HYPERLIPIDEMIA TYPE: ICD-10-CM

## 2022-07-15 DIAGNOSIS — I10 ESSENTIAL HYPERTENSION: ICD-10-CM

## 2022-07-15 DIAGNOSIS — I25.5 ISCHEMIC CARDIOMYOPATHY: ICD-10-CM

## 2022-07-15 PROCEDURE — 99214 OFFICE O/P EST MOD 30 MIN: CPT | Performed by: NURSE PRACTITIONER

## 2022-07-15 RX ORDER — CLOPIDOGREL BISULFATE 75 MG/1
75 TABLET ORAL DAILY
Qty: 90 TABLET | Refills: 3 | Status: SHIPPED | OUTPATIENT
Start: 2022-07-15 | End: 2022-12-12

## 2022-07-15 NOTE — PROGRESS NOTES
Subjective   Stone Villeda is a 47 y.o. male     Chief Complaint   Patient presents with   • Follow-up       HPI    Problem list:    1.  CAD  1.1 stress test 6/3/2021-no evidence of ischemia, mild to moderately depressed post-rest EF of 46%  1.2 left heart cath 7/19/2021 - 50% narrowing in the proximal portion of the LAD, diffuse irregularities throughout the circumflex with 30 to 50% narrowing before the PDA, right coronary artery totally occluded just proximal to the acute margin, however FFR was 0.85.  EF 40 to 45%, LVEDP 12-14  1.3 stress test 10/26/2021-no evidence of ischemia however there is a reversible redistribution pattern in the inferior wall compatible myocardial viability, post-rest EF 55% with global hypokinesis  2.  Hypertension  3.  Hyperlipidemia  4.  Palpitations  4.1 event monitor 6/10-6/23/2021-PVCs, NSVT (5 beat)  5.  Shortness of breath  5.1 echo 6/3/2021-EF 50 to 55%, moderate LVH, diastolic function 1  5.2 Echo 10/26/2021-EF 60 to 65%, diastolic dysfunction 1  6.  Family history of early CAD; Brother first MI @ 38; DAD CABG @ 49  7.  DARON, CPAP  8.  Diabetes Mellitus II; recently dx 2021  9.  Smoking Habituation  10.  Renal artery ultrasound 6/21/2021-no hemodynamically significant renal artery stenosis    Patient is a 47-year-old male who presents today for a follow-up.  He denies any chest pain, pressure, palpitations, fluttering, presyncope, syncope, orthopnea, PND or edema.  He denies any shortness of breath with activity.  He is having some dizziness but I have noticed that he is lost 16 pounds since he was here last.  I advised whenever he comes back next week for his blood work to bring blood pressure readings about 2 hours after his medicines as he has not taken them yet today and we may need to make adjustments.  He gave verbal understanding.      Current Outpatient Medications on File Prior to Visit   Medication Sig Dispense Refill   • amLODIPine (NORVASC) 10 MG tablet Take 10 mg by  mouth Daily.     • aspirin (aspirin) 81 MG EC tablet Take 1 tablet by mouth Daily. 90 tablet 3   • carvedilol (COREG) 12.5 MG tablet Take 1 tablet by mouth 2 (Two) Times a Day With Meals. 60 tablet 11   • cloNIDine (Catapres) 0.1 MG tablet Take 1 tablet by mouth 3 (Three) Times a Day As Needed for High Blood Pressure (SBP > 160 or DBP > 90). 30 tablet 5   • isosorbide mononitrate (IMDUR) 30 MG 24 hr tablet Take 0.5 tablets by mouth Daily. 30 tablet 11   • metFORMIN (GLUCOPHAGE) 500 MG tablet Take 500 mg by mouth 2 (Two) Times a Day With Meals.     • nitroglycerin (NITROSTAT) 0.4 MG SL tablet 1 under the tongue as needed for angina, may repeat q5mins for up three doses 30 tablet 2   • rosuvastatin (CRESTOR) 20 MG tablet Take 1 tablet by mouth Daily. 90 tablet 3   • sacubitril-valsartan (ENTRESTO) 49-51 MG tablet Take 1 tablet by mouth 2 (Two) Times a Day. 60 tablet 11   • [DISCONTINUED] clopidogrel (PLAVIX) 75 MG tablet Take 1 tablet by mouth Daily. 30 tablet 11     No current facility-administered medications on file prior to visit.       ALLERGIES    Patient has no known allergies.    Past Medical History:   Diagnosis Date   • COVID-19 02/15/2022   • Diabetes mellitus (HCC)    • Hyperlipidemia    • Hypertension    • Sleep apnea        Social History     Socioeconomic History   • Marital status: Single   Tobacco Use   • Smoking status: Current Every Day Smoker     Packs/day: 0.50     Years: 30.00     Pack years: 15.00     Types: Cigarettes   • Smokeless tobacco: Never Used   Vaping Use   • Vaping Use: Never used   Substance and Sexual Activity   • Alcohol use: Never   • Drug use: Defer   • Sexual activity: Defer       Family History   Problem Relation Age of Onset   • Breast cancer Mother    • Hypertension Father    • Heart attack Father    • Heart attack Brother        Review of Systems   Constitutional: Negative for appetite change, chills, diaphoresis, fatigue and fever.   HENT: Negative for congestion,  "rhinorrhea and sore throat.    Eyes: Negative for visual disturbance.   Respiratory: Negative for cough, chest tightness, shortness of breath and wheezing.    Cardiovascular: Negative for chest pain, palpitations and leg swelling.   Gastrointestinal: Negative for abdominal pain, blood in stool, constipation, diarrhea, nausea and vomiting.   Endocrine: Negative for cold intolerance and heat intolerance.   Genitourinary: Negative for difficulty urinating, dysuria, frequency, hematuria and urgency.   Musculoskeletal: Positive for arthralgias (knees). Negative for back pain, joint swelling, neck pain and neck stiffness.   Skin: Negative for color change, pallor, rash and wound.   Allergic/Immunologic: Positive for environmental allergies (seasonal ). Negative for food allergies.   Neurological: Positive for dizziness (due to the heat ). Negative for weakness, light-headedness, numbness and headaches.   Hematological: Does not bruise/bleed easily.   Psychiatric/Behavioral: Positive for sleep disturbance (hard to go and hard to stay asleep he tosses and turns alot he is up and down all night he gets around 3-4 hrs a sleep ).       Objective   /85 (BP Location: Left arm, Patient Position: Sitting)   Pulse 78   Temp 97.6 °F (36.4 °C)   Ht 193 cm (76\")   Wt 114 kg (252 lb)   SpO2 99%   BMI 30.67 kg/m²   Vitals:    07/15/22 0804   BP: 158/85   BP Location: Left arm   Patient Position: Sitting   Pulse: 78   Temp: 97.6 °F (36.4 °C)   SpO2: 99%   Weight: 114 kg (252 lb)   Height: 193 cm (76\")      Lab Results (most recent)     None        Physical Exam  Vitals reviewed.   Constitutional:       General: He is awake.      Appearance: Normal appearance. He is well-developed and well-groomed. He is obese.   HENT:      Head: Normocephalic.   Eyes:      General: Lids are normal.   Neck:      Vascular: No carotid bruit, hepatojugular reflux or JVD.   Cardiovascular:      Rate and Rhythm: Normal rate and regular rhythm.      " Pulses:           Radial pulses are 2+ on the right side and 2+ on the left side.        Dorsalis pedis pulses are 2+ on the right side and 2+ on the left side.        Posterior tibial pulses are 2+ on the right side and 2+ on the left side.      Heart sounds: Normal heart sounds.   Pulmonary:      Effort: Pulmonary effort is normal.      Breath sounds: Normal breath sounds and air entry.   Abdominal:      General: Bowel sounds are normal.      Palpations: Abdomen is soft.   Musculoskeletal:      Right lower leg: No edema.      Left lower leg: No edema.   Skin:     General: Skin is warm and dry.   Neurological:      Mental Status: He is alert and oriented to person, place, and time.   Psychiatric:         Attention and Perception: Attention and perception normal.         Mood and Affect: Mood and affect normal.         Speech: Speech normal.         Behavior: Behavior normal. Behavior is cooperative.         Thought Content: Thought content normal.         Cognition and Memory: Cognition and memory normal.         Judgment: Judgment normal.         Procedure   Procedures         Assessment & Plan      Diagnosis Plan   1. 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 pectoris ([facility])  clopidogrel (PLAVIX) 75 MG tablet   2. Essential hypertension     3. Grade I diastolic dysfunction     4. Hyperlipidemia, unspecified hyperlipidemia type     5. Ischemic cardiomyopathy     6. NSVT (nonsustained ventricular tachycardia) ([facility])     7. PVC (premature ventricular contraction)     8. Palpitations     9. Shortness of breath     10. Peripheral edema     11. Smoking         Return in about 4 months (around 11/15/2022).       CAD-patient's on aspirin, beta and statin.  Hypertension-patient's on amlodipine, carvedilol and Entresto.  We may need to decrease his blood pressure medications due to weight loss.  He will bring blood pressure readings and when he comes for blood work next week.  He  has not had his medication yet today but says he is having some dizziness.  Diastolic dysfunction/peripheral edema/ischemic cardiomyopathy-patient's on beta-blocker and Entresto with no signs of failure.  Hyperlipidemia-patient is on Crestor.  He is coming next week for lab work.  NSVT/PVCs/palpitations-doing well on beta-blocker.  Shortness of breath-resolved.  Smoking-encourage cessation.  Patient will continue his medication regimen.  He will follow-up in 4 months or sooner if any changes.    Stone Villeda  reports that he has been smoking cigarettes. He has a 15.00 pack-year smoking history. He has never used smokeless tobacco.. I have educated him on the risk of diseases from using tobacco products such as cancer, COPD and heart disease.     I advised him to quit and he is not willing to quit.    I spent 3  minutes counseling the patient.         Patient did not bring med list or medicine bottles to appointment, med list has been reviewed and updated based on patient's knowledge of their meds.       Electronically signed by:

## 2022-11-17 ENCOUNTER — OFFICE VISIT (OUTPATIENT)
Dept: CARDIOLOGY | Facility: CLINIC | Age: 47
End: 2022-11-17

## 2022-11-17 VITALS
SYSTOLIC BLOOD PRESSURE: 182 MMHG | HEIGHT: 76 IN | WEIGHT: 262.2 LBS | BODY MASS INDEX: 31.93 KG/M2 | OXYGEN SATURATION: 97 % | HEART RATE: 92 BPM | DIASTOLIC BLOOD PRESSURE: 105 MMHG

## 2022-11-17 DIAGNOSIS — I47.29 NSVT (NONSUSTAINED VENTRICULAR TACHYCARDIA): ICD-10-CM

## 2022-11-17 DIAGNOSIS — F17.200 SMOKING: ICD-10-CM

## 2022-11-17 DIAGNOSIS — R94.31 EKG ABNORMALITIES: ICD-10-CM

## 2022-11-17 DIAGNOSIS — I25.5 ISCHEMIC CARDIOMYOPATHY: ICD-10-CM

## 2022-11-17 DIAGNOSIS — I49.3 PVC (PREMATURE VENTRICULAR CONTRACTION): ICD-10-CM

## 2022-11-17 DIAGNOSIS — I25.119 CORONARY ARTERY DISEASE INVOLVING NATIVE CORONARY ARTERY OF NATIVE HEART WITH ANGINA PECTORIS: Primary | ICD-10-CM

## 2022-11-17 DIAGNOSIS — I51.89 GRADE I DIASTOLIC DYSFUNCTION: ICD-10-CM

## 2022-11-17 DIAGNOSIS — R06.02 SHORTNESS OF BREATH: ICD-10-CM

## 2022-11-17 DIAGNOSIS — E78.5 HYPERLIPIDEMIA, UNSPECIFIED HYPERLIPIDEMIA TYPE: ICD-10-CM

## 2022-11-17 DIAGNOSIS — R60.9 PERIPHERAL EDEMA: ICD-10-CM

## 2022-11-17 DIAGNOSIS — I10 ESSENTIAL HYPERTENSION: ICD-10-CM

## 2022-11-17 PROCEDURE — 99214 OFFICE O/P EST MOD 30 MIN: CPT | Performed by: NURSE PRACTITIONER

## 2022-11-17 PROCEDURE — 93000 ELECTROCARDIOGRAM COMPLETE: CPT | Performed by: NURSE PRACTITIONER

## 2022-11-17 NOTE — PROGRESS NOTES
Subjective   Stone Villeda is a 47 y.o. male     Chief Complaint   Patient presents with   • Follow-up     [address] [place] [address]    Problem list:    1.  CAD  1.1 stress test 6/3/2021-no evidence of ischemia, mild to moderately depressed post-rest EF of 46%  1.2 left heart cath 7/19/2021 - 50% narrowing in the proximal portion of the LAD, diffuse irregularities throughout the circumflex with 30 to 50% narrowing before the PDA, right coronary artery totally occluded just proximal to the acute margin, however FFR was 0.85.  EF 40 to 45%, LVEDP 12-14  1.3 stress test 10/26/2021-no evidence of ischemia however there is a reversible redistribution pattern in the inferior wall compatible myocardial viability, post-rest EF 55% with global hypokinesis  2.  Hypertension  3.  Hyperlipidemia  4.  Palpitations  4.1 event monitor 6/10-6/23/2021-PVCs, NSVT (5 beat)  5.  Shortness of breath  5.1 echo 6/3/2021-EF 50 to 55%, moderate LVH, diastolic function 1  5.2 Echo 10/26/2021-EF 60 to 65%, diastolic dysfunction 1  6.  Family history of early CAD; Brother first MI @ 38; DAD CABG @ 49  7.  DARON, CPAP  8.  Diabetes Mellitus II; recently dx 2021  9.  Smoking Habituation  10.  Renal artery ultrasound 6/21/2021-no hemodynamically significant renal artery stenosis    Patient is a 47-year-old male who presents today for a follow-up with his daughter at his side.  He denies any chest pain, pressure, palpitations, fluttering, presyncope, syncope, orthopnea, PND or edema.  He says when he was taking his medications he was dizzy, weak and fatigued.  He said he would get fatigue in his jaw when he ate.  Patient says once he stopped all of his medications it resolved.  I advised patient with blood pressure elevated today we do need to get him started back on some of his medications.  He said that he tolerated the amlodipine and the Entresto.  He also tolerated the isosorbide.  So we are going to stop the Coreg for now and have him go back on all the  other medications and see how he does.  He denies any shortness of breath with activity.    We went over his labs.  His last LDL was 137, triglycerides 176, creatinine 1.0 and K was 5.0.  We also went over his significant EKG changes.    Current Outpatient Medications on File Prior to Visit   Medication Sig Dispense Refill   • amLODIPine (NORVASC) 10 MG tablet Take 10 mg by mouth Daily.     • aspirin (aspirin) 81 MG EC tablet Take 1 tablet by mouth Daily. 90 tablet 3   • cloNIDine (Catapres) 0.1 MG tablet Take 1 tablet by mouth 3 (Three) Times a Day As Needed for High Blood Pressure (SBP > 160 or DBP > 90). 30 tablet 5   • clopidogrel (PLAVIX) 75 MG tablet Take 1 tablet by mouth Daily. 90 tablet 3   • isosorbide mononitrate (IMDUR) 30 MG 24 hr tablet Take 0.5 tablets by mouth Daily. 30 tablet 11   • metFORMIN (GLUCOPHAGE) 500 MG tablet Take 500 mg by mouth 2 (Two) Times a Day With Meals.     • nitroglycerin (NITROSTAT) 0.4 MG SL tablet 1 under the tongue as needed for angina, may repeat q5mins for up three doses 30 tablet 2   • rosuvastatin (CRESTOR) 20 MG tablet Take 1 tablet by mouth Daily. 90 tablet 3   • sacubitril-valsartan (ENTRESTO) 49-51 MG tablet Take 1 tablet by mouth 2 (Two) Times a Day. 60 tablet 11   • [DISCONTINUED] carvedilol (COREG) 12.5 MG tablet Take 1 tablet by mouth 2 (Two) Times a Day With Meals. 60 tablet 11     No current facility-administered medications on file prior to visit.       ALLERGIES    Patient has no known allergies.    Past Medical History:   Diagnosis Date   • COVID-19 02/15/2022   • Diabetes mellitus (HCC)    • Hyperlipidemia    • Hypertension    • Sleep apnea        Social History     Socioeconomic History   • Marital status: Single   Tobacco Use   • Smoking status: Every Day     Packs/day: 0.50     Years: 30.00     Pack years: 15.00     Types: Cigarettes   • Smokeless tobacco: Never   Vaping Use   • Vaping Use: Never used   Substance and Sexual Activity   • Alcohol use: Never  "  • Drug use: Defer   • Sexual activity: Defer       Family History   Problem Relation Age of Onset   • Breast cancer Mother    • Hypertension Father    • Heart attack Father    • Heart attack Brother        Review of Systems   Constitutional: Positive for fatigue (when was taking meds gets weak, dizzy and jaw fatigue ). Negative for diaphoresis.   HENT: Negative for congestion, rhinorrhea and sore throat.    Eyes: Negative for visual disturbance.   Respiratory: Negative for chest tightness and shortness of breath.    Cardiovascular: Negative for chest pain, palpitations and leg swelling.        Pt states that he does not take any of his meds as he feels like he can't function on them.      Gastrointestinal: Negative for abdominal pain, blood in stool, constipation, diarrhea, nausea and vomiting.   Endocrine: Positive for heat intolerance. Negative for cold intolerance.   Genitourinary: Negative for difficulty urinating, dysuria, frequency, hematuria and urgency.   Musculoskeletal: Positive for arthralgias (knees). Negative for back pain and neck pain.   Skin: Negative for rash and wound.   Allergic/Immunologic: Positive for environmental allergies. Negative for food allergies.   Neurological: Positive for dizziness (when he takes his meds) and weakness (When he takes his meds ). Negative for syncope, light-headedness, numbness and headaches.   Hematological: Does not bruise/bleed easily.   Psychiatric/Behavioral: Positive for sleep disturbance (  Unchanged ).       Objective   BP (!) 182/105   Pulse 92   Ht 193 cm (76\")   Wt 119 kg (262 lb 3.2 oz)   SpO2 97%   BMI 31.92 kg/m²   Vitals:    11/17/22 1518   BP: (!) 182/105   Pulse: 92   SpO2: 97%   Weight: 119 kg (262 lb 3.2 oz)   Height: 193 cm (76\")      Lab Results (most recent)     None        Physical Exam  Vitals reviewed.   Constitutional:       General: He is awake.      Appearance: Normal appearance. He is well-developed and well-groomed. He is obese. "   HENT:      Head: Normocephalic.   Eyes:      General: Lids are normal.   Neck:      Vascular: No carotid bruit, hepatojugular reflux or JVD.   Cardiovascular:      Rate and Rhythm: Normal rate and regular rhythm.      Pulses:           Radial pulses are 2+ on the right side and 2+ on the left side.        Dorsalis pedis pulses are 2+ on the right side and 2+ on the left side.        Posterior tibial pulses are 2+ on the right side and 2+ on the left side.      Heart sounds: Normal heart sounds.   Pulmonary:      Effort: Pulmonary effort is normal.      Breath sounds: Normal breath sounds and air entry.   Abdominal:      General: Bowel sounds are normal.      Palpations: Abdomen is soft.   Musculoskeletal:      Right lower leg: No edema.      Left lower leg: No edema.   Skin:     General: Skin is warm and dry.   Neurological:      Mental Status: He is alert and oriented to person, place, and time.   Psychiatric:         Attention and Perception: Attention and perception normal.         Mood and Affect: Mood and affect normal.         Speech: Speech normal.         Behavior: Behavior normal. Behavior is cooperative.         Thought Content: Thought content normal.         Cognition and Memory: Cognition and memory normal.         Judgment: Judgment normal.         Procedure     ECG 12 Lead    Date/Time: 11/17/2022 3:42 PM  Performed by: Lucy Flood APRN  Authorized by: Lucy Flood APRN   Comparison: compared with previous ECG from 9/6/2021  Comparison to previous ECG: T wave changes in leads   I, II, III, aVF and leads V5 and V6  Rhythm: sinus rhythm  Rate: normal  BPM: 84  ST Depression: I, II, III, aVF, V5 and V6  T inversion: I, II, III, aVF, V5 and V6  QRS axis: normal    Clinical impression: abnormal EKG                 Assessment & Plan      Diagnosis Plan   1. 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 pectoris (HCC)  ECG 12 Lead    Stress Test With  Myocardial Perfusion One Day    Adult Transthoracic Echo Complete W/ Cont if Necessary Per Protocol      2. Essential hypertension  ECG 12 Lead    Stress Test With Myocardial Perfusion One Day    Adult Transthoracic Echo Complete W/ Cont if Necessary Per Protocol      3. Grade I diastolic dysfunction  Adult Transthoracic Echo Complete W/ Cont if Necessary Per Protocol      4. Hyperlipidemia, unspecified hyperlipidemia type  Stress Test With Myocardial Perfusion One Day      5. Ischemic cardiomyopathy  Stress Test With Myocardial Perfusion One Day    Adult Transthoracic Echo Complete W/ Cont if Necessary Per Protocol      6. NSVT (nonsustained ventricular tachycardia)  ECG 12 Lead    Stress Test With Myocardial Perfusion One Day    Adult Transthoracic Echo Complete W/ Cont if Necessary Per Protocol      7. PVC (premature ventricular contraction)  ECG 12 Lead    Stress Test With Myocardial Perfusion One Day    Adult Transthoracic Echo Complete W/ Cont if Necessary Per Protocol      8. Shortness of breath  Stress Test With Myocardial Perfusion One Day    Adult Transthoracic Echo Complete W/ Cont if Necessary Per Protocol      9. Peripheral edema        10. Smoking  Stress Test With Myocardial Perfusion One Day      11. EKG abnormalities  Stress Test With Myocardial Perfusion One Day          Return in about 12 weeks (around 2/9/2023), or nurse visit  2 weeks for VS check.    CAD/hypertension/diastolic dysfunction/hyperlipidemia/ischemic cardiomyopathy/NSVT/PVCs/shortness of breath/smoking/EKG abnormalities-patient have a repeat ischemia work-up, stress and echo.  We will try to get it done as soon as possible due to significant EKG changes even though he does not have any symptoms.  Ischemic cardiomyopathy/diastolic dysfunction/peripheral edema-patient's going back on his Entresto.  He will continue his medication regimen with exception of discontinuing his Coreg.  Again he has not been taking any of his medication.   We will have to repeat his lipid and CMP as well after his been back on the medication for 6 weeks.  He is going to come back in 2 weeks for vital sign check and then also 12 weeks or sooner if any changes or abnormalities with testing.       Stone Villeda  reports that he has been smoking cigarettes. He has a 15.00 pack-year smoking history. He has never used smokeless tobacco.. I have educated him on the risk of diseases from using tobacco products such as cancer, COPD and heart disease.     I advised him to quit and he is not willing to quit.        Electronically signed by:

## 2022-12-06 ENCOUNTER — TELEPHONE (OUTPATIENT)
Dept: CARDIOLOGY | Facility: CLINIC | Age: 47
End: 2022-12-06

## 2022-12-06 ENCOUNTER — HOSPITAL ENCOUNTER (OUTPATIENT)
Dept: CARDIOLOGY | Facility: HOSPITAL | Age: 47
Discharge: HOME | End: 2022-12-06

## 2022-12-06 ENCOUNTER — CLINICAL SUPPORT (OUTPATIENT)
Dept: CARDIOLOGY | Facility: CLINIC | Age: 47
End: 2022-12-06

## 2022-12-06 VITALS
HEIGHT: 76 IN | WEIGHT: 259 LBS | TEMPERATURE: 98.6 F | DIASTOLIC BLOOD PRESSURE: 89 MMHG | HEART RATE: 79 BPM | SYSTOLIC BLOOD PRESSURE: 149 MMHG | BODY MASS INDEX: 31.54 KG/M2 | OXYGEN SATURATION: 98 %

## 2022-12-06 DIAGNOSIS — I49.3 PVC (PREMATURE VENTRICULAR CONTRACTION): ICD-10-CM

## 2022-12-06 DIAGNOSIS — R06.02 SHORTNESS OF BREATH: ICD-10-CM

## 2022-12-06 DIAGNOSIS — I25.5 ISCHEMIC CARDIOMYOPATHY: ICD-10-CM

## 2022-12-06 DIAGNOSIS — I10 ESSENTIAL HYPERTENSION: ICD-10-CM

## 2022-12-06 DIAGNOSIS — I51.89 GRADE I DIASTOLIC DYSFUNCTION: ICD-10-CM

## 2022-12-06 DIAGNOSIS — F17.200 SMOKING: ICD-10-CM

## 2022-12-06 DIAGNOSIS — I25.119 CORONARY ARTERY DISEASE INVOLVING NATIVE CORONARY ARTERY OF NATIVE HEART WITH ANGINA PECTORIS: ICD-10-CM

## 2022-12-06 DIAGNOSIS — I47.29 NSVT (NONSUSTAINED VENTRICULAR TACHYCARDIA): ICD-10-CM

## 2022-12-06 DIAGNOSIS — R94.31 EKG ABNORMALITIES: ICD-10-CM

## 2022-12-06 DIAGNOSIS — I10 ESSENTIAL HYPERTENSION: Primary | ICD-10-CM

## 2022-12-06 DIAGNOSIS — E78.5 HYPERLIPIDEMIA, UNSPECIFIED HYPERLIPIDEMIA TYPE: ICD-10-CM

## 2022-12-06 LAB
BH CV ECHO MEAS - ACS: 2.26 CM
BH CV ECHO MEAS - AO MAX PG: 7.5 MMHG
BH CV ECHO MEAS - AO MEAN PG: 4.2 MMHG
BH CV ECHO MEAS - AO ROOT DIAM: 3.3 CM
BH CV ECHO MEAS - AO V2 MAX: 137.1 CM/SEC
BH CV ECHO MEAS - AO V2 VTI: 27.2 CM
BH CV ECHO MEAS - AVA(I,D): 4.7 CM2
BH CV ECHO MEAS - EDV(CUBED): 33.4 ML
BH CV ECHO MEAS - EDV(MOD-SP4): 130 ML
BH CV ECHO MEAS - EF(MOD-SP4): 67.2 %
BH CV ECHO MEAS - EF_3D-VOL: 57 %
BH CV ECHO MEAS - ESV(CUBED): 7 ML
BH CV ECHO MEAS - ESV(MOD-SP4): 42.7 ML
BH CV ECHO MEAS - FS: 40.7 %
BH CV ECHO MEAS - IVS/LVPW: 0.91 CM
BH CV ECHO MEAS - IVSD: 2.46 CM
BH CV ECHO MEAS - LA DIMENSION: 4.1 CM
BH CV ECHO MEAS - LAT PEAK E' VEL: 5.2 CM/SEC
BH CV ECHO MEAS - LV DIASTOLIC VOL/BSA (35-75): 52.3 CM2
BH CV ECHO MEAS - LV MASS(C)D: 460.7 GRAMS
BH CV ECHO MEAS - LV MAX PG: 7.6 MMHG
BH CV ECHO MEAS - LV MEAN PG: 4.2 MMHG
BH CV ECHO MEAS - LV SYSTOLIC VOL/BSA (12-30): 17.2 CM2
BH CV ECHO MEAS - LV V1 MAX: 137.5 CM/SEC
BH CV ECHO MEAS - LV V1 VTI: 26.4 CM
BH CV ECHO MEAS - LVIDD: 3.2 CM
BH CV ECHO MEAS - LVIDS: 1.91 CM
BH CV ECHO MEAS - LVOT AREA: 4.9 CM2
BH CV ECHO MEAS - LVOT DIAM: 2.49 CM
BH CV ECHO MEAS - LVPWD: 2.7 CM
BH CV ECHO MEAS - MED PEAK E' VEL: 4.7 CM/SEC
BH CV ECHO MEAS - MV A MAX VEL: 55.3 CM/SEC
BH CV ECHO MEAS - MV DEC SLOPE: 191.1 CM/SEC2
BH CV ECHO MEAS - MV E MAX VEL: 60.4 CM/SEC
BH CV ECHO MEAS - MV E/A: 1.09
BH CV ECHO MEAS - RVDD: 2.7 CM
BH CV ECHO MEAS - SI(MOD-SP4): 35.1 ML/M2
BH CV ECHO MEAS - SV(LVOT): 128.7 ML
BH CV ECHO MEAS - SV(MOD-SP4): 87.3 ML
BH CV ECHO MEASUREMENTS AVERAGE E/E' RATIO: 12.2
BH CV REST NUCLEAR ISOTOPE DOSE: 10 MCI
BH CV STRESS COMMENTS STAGE 1: NORMAL
BH CV STRESS DOSE REGADENOSON STAGE 1: 0.4
BH CV STRESS DURATION MIN STAGE 1: 0
BH CV STRESS DURATION SEC STAGE 1: 10
BH CV STRESS NUCLEAR ISOTOPE DOSE: 30 MCI
BH CV STRESS PROTOCOL 1: NORMAL
BH CV STRESS RECOVERY BP: NORMAL MMHG
BH CV STRESS RECOVERY HR: 88 BPM
BH CV STRESS STAGE 1: 1
LEFT ATRIUM VOLUME INDEX: 21.7 ML/M2
MAXIMAL PREDICTED HEART RATE: 174 BPM
MAXIMAL PREDICTED HEART RATE: 174 BPM
PERCENT MAX PREDICTED HR: 60.92 %
STRESS BASELINE BP: NORMAL MMHG
STRESS BASELINE HR: 85 BPM
STRESS PERCENT HR: 72 %
STRESS POST PEAK BP: NORMAL MMHG
STRESS POST PEAK HR: 106 BPM
STRESS TARGET HR: 148 BPM
STRESS TARGET HR: 148 BPM

## 2022-12-06 PROCEDURE — 93018 CV STRESS TEST I&R ONLY: CPT | Performed by: INTERNAL MEDICINE

## 2022-12-06 PROCEDURE — 0 TECHNETIUM SESTAMIBI: Performed by: INTERNAL MEDICINE

## 2022-12-06 PROCEDURE — 93017 CV STRESS TEST TRACING ONLY: CPT

## 2022-12-06 PROCEDURE — 93306 TTE W/DOPPLER COMPLETE: CPT

## 2022-12-06 PROCEDURE — A9500 TC99M SESTAMIBI: HCPCS | Performed by: INTERNAL MEDICINE

## 2022-12-06 PROCEDURE — 25010000002 REGADENOSON 0.4 MG/5ML SOLUTION: Performed by: INTERNAL MEDICINE

## 2022-12-06 PROCEDURE — 93306 TTE W/DOPPLER COMPLETE: CPT | Performed by: INTERNAL MEDICINE

## 2022-12-06 PROCEDURE — 78452 HT MUSCLE IMAGE SPECT MULT: CPT

## 2022-12-06 PROCEDURE — 78452 HT MUSCLE IMAGE SPECT MULT: CPT | Performed by: INTERNAL MEDICINE

## 2022-12-06 RX ORDER — ATENOLOL 25 MG/1
12.5 TABLET ORAL DAILY
Qty: 30 TABLET | Refills: 11 | Status: SHIPPED | OUTPATIENT
Start: 2022-12-06 | End: 2022-12-30 | Stop reason: HOSPADM

## 2022-12-06 RX ADMIN — REGADENOSON 0.4 MG: 0.08 INJECTION, SOLUTION INTRAVENOUS at 11:05

## 2022-12-06 RX ADMIN — TECHNETIUM TC 99M SESTAMIBI 1 DOSE: 1 INJECTION INTRAVENOUS at 11:05

## 2022-12-06 RX ADMIN — TECHNETIUM TC 99M SESTAMIBI 1 DOSE: 1 INJECTION INTRAVENOUS at 09:29

## 2022-12-06 NOTE — TELEPHONE ENCOUNTER
----- Message from ELEANOR Keenan sent at 12/6/2022  3:00 PM EST -----  Please advise patient that his EF is excellent at 61 to 65% however he does have moderate to severe LVH which means his left muscle is thickened of his heart and that is from having uncontrolled blood pressure when he was not taking his medication.  Treatment of choice is a beta-blocker.  I know his blood pressure is much better today is he willing to try atenolol which is not systemic affecting it just affects the heart if so just to 25 mg a half a tab once a day to start and see how he tolerates this.      Unable to leave  due to [facility] is not accepting calls at this time . When pt returns call please advise the info above and if pt is willing to start the Atenolol 25 mg ( half tablet daily ) we will send in medication to [facility] . Mediation is pending ( waiting on pt to decide)      ETIENNE Logan    Pt called back and was advised of the mess above he states that he is willing to try the medication. meds sent to [facility]

## 2022-12-06 NOTE — PROGRESS NOTES
Please advise patient that his EF is excellent at 61 to 65% however he does have moderate to severe LVH which means his left muscle is thickened of his heart and that is from having uncontrolled blood pressure when he was not taking his medication.  Treatment of choice is a beta-blocker.  I know his blood pressure is much better today is he willing to try atenolol which is not systemic affecting it just affects the heart if so just to 25 mg a half a tab once a day to start and see how he tolerates this.

## 2022-12-06 NOTE — TELEPHONE ENCOUNTER
Caller: Stone Villeda    Relationship to patient: Self    Best call back number: [phone]    Patient is needing: PATIENT CALLED BACK TO FIND OUT TEST RESULTS

## 2022-12-06 NOTE — PROGRESS NOTES
Stone Villeda  1975 12/6/2022   ?   Chief Complaint   Patient presents with   • Nurse Visit       ?   HPI:   Return in about 12 weeks (around 2/9/2023), or nurse visit  2 weeks for VS check.  ?   ? He will continue his medication regimen with exception of discontinuing his Coreg.  Again he has not been taking any of his medication.  We will have to repeat his lipid and CMP as well after his been back on the medication for 6 weeks.  He is going to come back in 2 weeks for vital sign check and then also 12 weeks or sooner if any changes or abnormalities with testing.  ?     Current Outpatient Medications:   •  amLODIPine (NORVASC) 10 MG tablet, Take 10 mg by mouth Daily., Disp: , Rfl:   •  aspirin (aspirin) 81 MG EC tablet, Take 1 tablet by mouth Daily., Disp: 90 tablet, Rfl: 3  •  cloNIDine (Catapres) 0.1 MG tablet, Take 1 tablet by mouth 3 (Three) Times a Day As Needed for High Blood Pressure (SBP > 160 or DBP > 90)., Disp: 30 tablet, Rfl: 5  •  clopidogrel (PLAVIX) 75 MG tablet, Take 1 tablet by mouth Daily., Disp: 90 tablet, Rfl: 3  •  isosorbide mononitrate (IMDUR) 30 MG 24 hr tablet, Take 0.5 tablets by mouth Daily., Disp: 30 tablet, Rfl: 11  •  metFORMIN (GLUCOPHAGE) 500 MG tablet, Take 500 mg by mouth 2 (Two) Times a Day With Meals., Disp: , Rfl:   •  nitroglycerin (NITROSTAT) 0.4 MG SL tablet, 1 under the tongue as needed for angina, may repeat q5mins for up three doses, Disp: 30 tablet, Rfl: 2  •  rosuvastatin (CRESTOR) 20 MG tablet, Take 1 tablet by mouth Daily., Disp: 90 tablet, Rfl: 3  •  sacubitril-valsartan (ENTRESTO) 49-51 MG tablet, Take 1 tablet by mouth 2 (Two) Times a Day., Disp: 60 tablet, Rfl: 11  No current facility-administered medications for this visit.   ?   ?   Patient has no known allergies.       Procedures     ?   Assessment & Plan    ?   ?1. Hypertension     Patient presents into office today for Vital signs and Bp check . Pt states that he is taking all medication has prescribed .  Patients stats that the Fatigued is a lot better since D/C the Coreg . He denies any Chest pain , palpations , tightness in the chest . Pt states that his Bp was 130/79 a few mins ago before his Stress test . Pt denies any issues at this time . Patient did not bring in a Bp log   ?     per ELEANOR Ca     Pt will continue all medication has prescribed and will keep his f/up atp unless he needs to be seen sooner

## 2022-12-07 ENCOUNTER — OFFICE VISIT (OUTPATIENT)
Dept: CARDIOLOGY | Facility: CLINIC | Age: 47
End: 2022-12-07

## 2022-12-07 VITALS
WEIGHT: 259 LBS | DIASTOLIC BLOOD PRESSURE: 89 MMHG | TEMPERATURE: 97.5 F | OXYGEN SATURATION: 100 % | HEART RATE: 98 BPM | SYSTOLIC BLOOD PRESSURE: 178 MMHG | HEIGHT: 76 IN | BODY MASS INDEX: 31.54 KG/M2

## 2022-12-07 DIAGNOSIS — R94.39 ABNORMAL STRESS TEST: ICD-10-CM

## 2022-12-07 DIAGNOSIS — I10 ESSENTIAL HYPERTENSION: ICD-10-CM

## 2022-12-07 DIAGNOSIS — F17.200 SMOKING: ICD-10-CM

## 2022-12-07 DIAGNOSIS — I25.119 CORONARY ARTERY DISEASE INVOLVING NATIVE CORONARY ARTERY OF NATIVE HEART WITH ANGINA PECTORIS: Primary | ICD-10-CM

## 2022-12-07 DIAGNOSIS — I25.5 ISCHEMIC CARDIOMYOPATHY: ICD-10-CM

## 2022-12-07 DIAGNOSIS — I47.29 NSVT (NONSUSTAINED VENTRICULAR TACHYCARDIA): ICD-10-CM

## 2022-12-07 DIAGNOSIS — I49.3 PVC (PREMATURE VENTRICULAR CONTRACTION): ICD-10-CM

## 2022-12-07 DIAGNOSIS — E78.5 HYPERLIPIDEMIA, UNSPECIFIED HYPERLIPIDEMIA TYPE: ICD-10-CM

## 2022-12-07 DIAGNOSIS — I51.89 GRADE I DIASTOLIC DYSFUNCTION: ICD-10-CM

## 2022-12-07 DIAGNOSIS — R60.9 PERIPHERAL EDEMA: ICD-10-CM

## 2022-12-07 PROCEDURE — 99214 OFFICE O/P EST MOD 30 MIN: CPT | Performed by: NURSE PRACTITIONER

## 2022-12-07 NOTE — PROGRESS NOTES
Subjective   Stone Villeda is a 47 y.o. male     Chief Complaint   Patient presents with   • Follow-up       HPI    Problem list:    1.  CAD  1.1 stress test 6/3/2021-no evidence of ischemia, mild to moderately depressed post-rest EF of 46%  1.2 left heart cath 7/19/2021 - 50% narrowing in the proximal portion of the LAD, diffuse irregularities throughout the circumflex with 30 to 50% narrowing before the PDA, right coronary artery totally occluded just proximal to the acute margin, however FFR was 0.85.  EF 40 to 45%, LVEDP 12-14  1.3 stress test 10/26/2021-no evidence of ischemia however there is a reversible redistribution pattern in the inferior wall compatible myocardial viability, post-rest EF 55% with global hypokinesis  2.  Hypertension  3.  Hyperlipidemia  4.  Palpitations  4.1 event monitor 6/10-6/23/2021-PVCs, NSVT (5 beat)  5.  Shortness of breath  5.1 echo 6/3/2021-EF 50 to 55%, moderate LVH, diastolic function 1  5.2 Echo 10/26/2021-EF 60 to 65%, diastolic dysfunction 1  6.  Family history of early CAD; Brother first MI @ 38; DAD CABG @ 49  7.  DARON, CPAP  8.  Diabetes Mellitus II; recently dx 2021  9.  Smoking Habituation  10.  Renal artery ultrasound 6/21/2021-no hemodynamically significant renal artery stenosis        Current Outpatient Medications on File Prior to Visit   Medication Sig Dispense Refill   • amLODIPine (NORVASC) 10 MG tablet Take 10 mg by mouth Daily.     • aspirin (aspirin) 81 MG EC tablet Take 1 tablet by mouth Daily. 90 tablet 3   • atenolol (TENORMIN) 25 MG tablet Take 0.5 tablets by mouth Daily. 30 tablet 11   • cloNIDine (Catapres) 0.1 MG tablet Take 1 tablet by mouth 3 (Three) Times a Day As Needed for High Blood Pressure (SBP > 160 or DBP > 90). 30 tablet 5   • clopidogrel (PLAVIX) 75 MG tablet Take 1 tablet by mouth Daily. 90 tablet 3   • isosorbide mononitrate (IMDUR) 30 MG 24 hr tablet Take 0.5 tablets by mouth Daily. 30 tablet 11   • metFORMIN (GLUCOPHAGE) 500 MG tablet Take  "500 mg by mouth 2 (Two) Times a Day With Meals.     • nitroglycerin (NITROSTAT) 0.4 MG SL tablet 1 under the tongue as needed for angina, may repeat q5mins for up three doses 30 tablet 2   • rosuvastatin (CRESTOR) 20 MG tablet Take 1 tablet by mouth Daily. 90 tablet 3   • sacubitril-valsartan (ENTRESTO) 49-51 MG tablet Take 1 tablet by mouth 2 (Two) Times a Day. 60 tablet 11     Current Facility-Administered Medications on File Prior to Visit   Medication Dose Route Frequency Provider Last Rate Last Admin   • [COMPLETED] regadenoson (LEXISCAN) injection 0.4 mg  0.4 mg Intravenous Once in imaging Norris Nicholson MD   0.4 mg at 12/06/22 1105   • [COMPLETED] technetium sestamibi (CARDIOLITE) injection 1 dose  1 dose Intravenous Once in imaging Norris Nicholson MD   1 dose at 12/06/22 1105       ALLERGIES    Patient has no known allergies.    Past Medical History:   Diagnosis Date   • COVID-19 02/15/2022   • Diabetes mellitus (HCC)    • Hyperlipidemia    • Hypertension    • Sleep apnea        Social History     Socioeconomic History   • Marital status: Single   Tobacco Use   • Smoking status: Every Day     Packs/day: 0.50     Years: 30.00     Pack years: 15.00     Types: Cigarettes   • Smokeless tobacco: Never   Vaping Use   • Vaping Use: Never used   Substance and Sexual Activity   • Alcohol use: Never   • Drug use: Defer   • Sexual activity: Defer       Family History   Problem Relation Age of Onset   • Breast cancer Mother    • Hypertension Father    • Heart attack Father    • Heart attack Brother        Review of Systems    Objective   BP (!) 197/103 (BP Location: Left arm, Patient Position: Sitting)   Pulse 98   Temp 97.5 °F (36.4 °C)   Ht 193 cm (76\")   Wt 117 kg (259 lb)   SpO2 100%   BMI 31.53 kg/m²   Vitals:    12/07/22 1006   BP: (!) 197/103   BP Location: Left arm   Patient Position: Sitting   Pulse: 98   Temp: 97.5 °F (36.4 °C)   SpO2: 100%   Weight: 117 kg (259 lb)   Height: 193 cm (76\")      Lab " "Results (most recent)     None        Physical Exam    Procedure   Procedures         Assessment & Plan      Diagnosis Plan   1. 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 pectoris (HCC)        2. Essential hypertension        3. Grade I diastolic dysfunction        4. Hyperlipidemia, unspecified hyperlipidemia type        5. Ischemic cardiomyopathy        6. NSVT (nonsustained ventricular tachycardia)        7. PVC (premature ventricular contraction)        8. Shortness of breath        9. Peripheral edema        10. Smoking        11. Abnormal stress test            No follow-ups on file.           Stone Villeda  reports that he has been smoking cigarettes. He has a 15.00 pack-year smoking history. He has never used smokeless tobacco.. I have educated him on the risk of diseases from using tobacco products such as {Tobacco Cessation Diseases:69197::\"cancer\",\"COPD\",\"heart disease\"}.     I advised him to quit and he is {Willing/Not Willing to Quit Tobacco Products:43353}.    I spent {Time Spent Tobacco :59654} minutes counseling the patient.         {BMI is >= 30 and <35. (Class 1 Obesity). The following options were offered after discussion;:[id number]}      Electronically signed by:        "

## 2022-12-07 NOTE — PROGRESS NOTES
Subjective   Stone Villeda is a 47 y.o. male     Chief Complaint   Patient presents with   • Follow-up       HPI    Problem list:    1.  CAD  1.1 left heart cath 7/19/2021 - 50% narrowing in the proximal portion of the LAD, diffuse irregularities throughout the circumflex with 30 to 50% narrowing before the PDA, right coronary artery totally occluded just proximal to the acute margin, however FFR was 0.85.  EF 40 to 45%, LVEDP 12-14  1.2 stress test 10/26/2021-no evidence of ischemia however there is a reversible redistribution pattern in the inferior wall compatible myocardial viability, post-rest EF 55% with global hypokinesis  1.3 stress test 12/6/2022-large, dense being completely reversible defect involving the inferior basilar and diaphragmatic segments with small extension into the inferolateral wall compatible with ischemia, post-rest EF 38% with global hypokinesis  2.  Hypertension  3.  Hyperlipidemia  4.  Palpitations  4.1 event monitor 6/10-6/23/2021-PVCs, NSVT (5 beat)  5.  Shortness of breath  5.1 Echo 10/26/2021-EF 60 to 65%, diastolic dysfunction 1  5.2 Echo 12/6/2022-EF 61 to 65%, moderate to severe LVH, diastolic dysfunction 2  6.  Family history of early CAD; Brother first MI @ 38; DAD CABG @ 49  7.  DARON, CPAP  8.  Diabetes Mellitus II; recently dx 2021  9.  Smoking Habituation  10.  Renal artery ultrasound 6/21/2021-no hemodynamically significant renal artery stenosis    Patient is a 47-year-old male who presents today for follow-up on stress test with his friend at his side.  He denies any chest pain, pressure, palpitations, fluttering, dizziness, presyncope, syncope, with apnea, PND or edema.  He denies any shortness of breath with activity.  His blood pressure is elevated but has not taken his medication yet today.  He was rushing to get here from [facility] to go over his test results.    We went over his echo and stress test.  A stress test is high risk positive and he has known CAD in the right  coronary artery with his FFR being 0.85 previously.  Patient has stopped taking his medication for a while due to feeling bad therefore the chances of his blockage being worse is a good likelihood.  Due to this even being asymptomatic we will proceed with left heart cath.  Patient still smokes, but plans on quitting.       Current Outpatient Medications on File Prior to Visit   Medication Sig Dispense Refill   • amLODIPine (NORVASC) 10 MG tablet Take 10 mg by mouth Daily.     • aspirin (aspirin) 81 MG EC tablet Take 1 tablet by mouth Daily. 90 tablet 3   • atenolol (TENORMIN) 25 MG tablet Take 0.5 tablets by mouth Daily. 30 tablet 11   • cloNIDine (Catapres) 0.1 MG tablet Take 1 tablet by mouth 3 (Three) Times a Day As Needed for High Blood Pressure (SBP > 160 or DBP > 90). 30 tablet 5   • clopidogrel (PLAVIX) 75 MG tablet Take 1 tablet by mouth Daily. 90 tablet 3   • isosorbide mononitrate (IMDUR) 30 MG 24 hr tablet Take 0.5 tablets by mouth Daily. 30 tablet 11   • metFORMIN (GLUCOPHAGE) 500 MG tablet Take 500 mg by mouth 2 (Two) Times a Day With Meals.     • nitroglycerin (NITROSTAT) 0.4 MG SL tablet 1 under the tongue as needed for angina, may repeat q5mins for up three doses 30 tablet 2   • rosuvastatin (CRESTOR) 20 MG tablet Take 1 tablet by mouth Daily. 90 tablet 3   • sacubitril-valsartan (ENTRESTO) 49-51 MG tablet Take 1 tablet by mouth 2 (Two) Times a Day. 60 tablet 11     Current Facility-Administered Medications on File Prior to Visit   Medication Dose Route Frequency Provider Last Rate Last Admin   • [COMPLETED] regadenoson (LEXISCAN) injection 0.4 mg  0.4 mg Intravenous Once in imaging Norris Nicholson MD   0.4 mg at 12/06/22 1105   • [COMPLETED] technetium sestamibi (CARDIOLITE) injection 1 dose  1 dose Intravenous Once in imaging Norris Nicholson MD   1 dose at 12/06/22 1105       ALLERGIES    Patient has no known allergies.    Past Medical History:   Diagnosis Date   • COVID-19 02/15/2022   •  "Diabetes mellitus (HCC)    • Hyperlipidemia    • Hypertension    • Sleep apnea        Social History     Socioeconomic History   • Marital status: Single   Tobacco Use   • Smoking status: Every Day     Packs/day: 0.50     Years: 30.00     Pack years: 15.00     Types: Cigarettes   • Smokeless tobacco: Never   Vaping Use   • Vaping Use: Never used   Substance and Sexual Activity   • Alcohol use: Never   • Drug use: Defer   • Sexual activity: Defer       Family History   Problem Relation Age of Onset   • Breast cancer Mother    • Hypertension Father    • Heart attack Father    • Heart attack Brother        Review of Systems   Constitutional: Negative for appetite change, chills, diaphoresis, fatigue and fever.   HENT: Negative for rhinorrhea and sore throat.    Eyes: Negative for visual disturbance.   Respiratory: Negative for cough, chest tightness, shortness of breath and wheezing.    Cardiovascular: Negative for chest pain, palpitations and leg swelling.   Gastrointestinal: Negative for abdominal pain, blood in stool, constipation, diarrhea, nausea and vomiting.   Endocrine: Negative for cold intolerance and heat intolerance.   Genitourinary: Negative for difficulty urinating, dysuria, frequency, hematuria and urgency.   Musculoskeletal: Positive for back pain (lower ) and joint swelling (knees). Negative for arthralgias, neck pain and neck stiffness.   Skin: Negative for color change, pallor, rash and wound.   Allergic/Immunologic: Negative for environmental allergies and food allergies.   Neurological: Negative for dizziness, syncope, weakness, light-headedness, numbness and headaches.   Hematological: Does not bruise/bleed easily.   Psychiatric/Behavioral: Negative for sleep disturbance.       Objective   /89 (BP Location: Left arm, Patient Position: Sitting)   Pulse 98   Temp 97.5 °F (36.4 °C)   Ht 193 cm (76\")   Wt 117 kg (259 lb)   SpO2 100%   BMI 31.53 kg/m²   Vitals:    12/07/22 1006 12/07/22 " "1008   BP: (!) 197/103 178/89   BP Location: Left arm Left arm   Patient Position: Sitting Sitting   Pulse: 98    Temp: 97.5 °F (36.4 °C)    SpO2: 100%    Weight: 117 kg (259 lb)    Height: 193 cm (76\")       Lab Results (most recent)     None        Physical Exam  Vitals reviewed.   Constitutional:       General: He is awake.      Appearance: Normal appearance. He is well-developed and well-groomed. He is obese.   HENT:      Head: Normocephalic.   Eyes:      General: Lids are normal.   Neck:      Vascular: No carotid bruit, hepatojugular reflux or JVD.   Cardiovascular:      Rate and Rhythm: Normal rate and regular rhythm.      Pulses:           Radial pulses are 2+ on the right side and 2+ on the left side.        Dorsalis pedis pulses are 2+ on the right side and 2+ on the left side.        Posterior tibial pulses are 2+ on the right side and 2+ on the left side.      Heart sounds: Normal heart sounds.   Pulmonary:      Effort: Pulmonary effort is normal.      Breath sounds: Normal air entry. Examination of the right-lower field reveals decreased breath sounds. Examination of the left-lower field reveals decreased breath sounds. Decreased breath sounds present.   Abdominal:      General: Bowel sounds are normal.      Palpations: Abdomen is soft.   Musculoskeletal:      Right lower leg: No edema.      Left lower leg: No edema.   Skin:     General: Skin is warm and dry.   Neurological:      Mental Status: He is alert and oriented to person, place, and time.   Psychiatric:         Attention and Perception: Attention and perception normal.         Mood and Affect: Mood and affect normal.         Speech: Speech normal.         Behavior: Behavior normal. Behavior is cooperative.         Thought Content: Thought content normal.         Cognition and Memory: Cognition and memory normal.         Judgment: Judgment normal.         Procedure   Procedures         Assessment & Plan      Diagnosis Plan   1. Coronary artery " disease involving native coronary artery of native heart with angina pectoris (HCC)  [facility]      2. Essential hypertension  [facility]      3. Grade I diastolic dysfunction        4. Hyperlipidemia, unspecified hyperlipidemia type  [facility]      5. Ischemic cardiomyopathy  [facility]      6. NSVT (nonsustained ventricular tachycardia)  [facility]      7. PVC (premature ventricular contraction)  [facility]      8. Peripheral edema        9. Smoking  [facility]      10. Abnormal stress test  [facility]          Return 2-4 weeks after [facility].    CAD/hypertension/hyperlipidemia/ischemic cardiomyopathy/NSVT/PVC/smoking/abnormal stress test-patient proceed with left heart cath with Dr. Carlson.  He will continue his medication regimen.  Diastolic dysfunction/peripheral edema/ischemic cardiomyopathy-patient has no signs of failure.  He is on beta-blocker and Entresto.  Patient will continue his medication regimen.  He will follow-up 2 to 4 weeks after heart cath or sooner if any changes.       Stone Villeda  reports that he has been smoking cigarettes. He has a 15.00 pack-year smoking history. He has never used smokeless tobacco.. I have educated him on the risk of diseases from using tobacco products such as cancer, COPD and heart disease.     I advised him to quit and he is not willing to quit.    I spent 3  minutes counseling the patient.         Patient did not bring med list or medicine bottles to appointment, med list has been reviewed and updated based on patient's knowledge of their meds.       Electronically signed by:

## 2022-12-12 ENCOUNTER — TELEPHONE (OUTPATIENT)
Dept: CARDIOLOGY | Facility: CLINIC | Age: 47
End: 2022-12-12

## 2022-12-12 ENCOUNTER — OFFICE VISIT (OUTPATIENT)
Dept: CARDIOLOGY | Facility: CLINIC | Age: 47
End: 2022-12-12

## 2022-12-12 VITALS
TEMPERATURE: 97.2 F | HEIGHT: 76 IN | SYSTOLIC BLOOD PRESSURE: 158 MMHG | HEART RATE: 85 BPM | BODY MASS INDEX: 31.54 KG/M2 | WEIGHT: 259 LBS | OXYGEN SATURATION: 96 % | DIASTOLIC BLOOD PRESSURE: 89 MMHG

## 2022-12-12 DIAGNOSIS — R06.02 SHORTNESS OF BREATH: ICD-10-CM

## 2022-12-12 DIAGNOSIS — E78.5 HYPERLIPIDEMIA, UNSPECIFIED HYPERLIPIDEMIA TYPE: ICD-10-CM

## 2022-12-12 DIAGNOSIS — I49.3 PVC (PREMATURE VENTRICULAR CONTRACTION): ICD-10-CM

## 2022-12-12 DIAGNOSIS — R60.9 PERIPHERAL EDEMA: ICD-10-CM

## 2022-12-12 DIAGNOSIS — I25.5 ISCHEMIC CARDIOMYOPATHY: ICD-10-CM

## 2022-12-12 DIAGNOSIS — Z82.49 FAMILY HISTORY OF EARLY CAD: ICD-10-CM

## 2022-12-12 DIAGNOSIS — E11.9 TYPE 2 DIABETES MELLITUS WITHOUT COMPLICATION, WITHOUT LONG-TERM CURRENT USE OF INSULIN: ICD-10-CM

## 2022-12-12 DIAGNOSIS — I51.89 GRADE I DIASTOLIC DYSFUNCTION: ICD-10-CM

## 2022-12-12 DIAGNOSIS — I10 ESSENTIAL HYPERTENSION: ICD-10-CM

## 2022-12-12 DIAGNOSIS — F17.200 SMOKING: ICD-10-CM

## 2022-12-12 DIAGNOSIS — I25.119 CORONARY ARTERY DISEASE INVOLVING NATIVE CORONARY ARTERY OF NATIVE HEART WITH ANGINA PECTORIS: Primary | ICD-10-CM

## 2022-12-12 DIAGNOSIS — I47.29 NSVT (NONSUSTAINED VENTRICULAR TACHYCARDIA): ICD-10-CM

## 2022-12-12 PROCEDURE — 99214 OFFICE O/P EST MOD 30 MIN: CPT | Performed by: NURSE PRACTITIONER

## 2022-12-12 RX ORDER — CLOPIDOGREL BISULFATE 75 MG/1
75 TABLET ORAL DAILY
Qty: 90 TABLET | Refills: 3
Start: 2022-12-12

## 2022-12-12 NOTE — PROGRESS NOTES
Subjective   Stone Villeda is a 47 y.o. male     Chief Complaint   Patient presents with   • Follow-up       HPI    Problem list:    1.  CAD  1.1 left heart cath 7/19/2021 - 50% narrowing in the proximal portion of the LAD, diffuse irregularities throughout the circumflex with 30 to 50% narrowing before the PDA, right coronary artery totally occluded just proximal to the acute margin, however FFR was 0.85.  EF 40 to 45%, LVEDP 12-14  1.2 stress test 10/26/2021-no evidence of ischemia however there is a reversible redistribution pattern in the inferior wall compatible myocardial viability, post-rest EF 55% with global hypokinesis  1.3 stress test 12/6/2022-large, dense being completely reversible defect involving the inferior basilar and diaphragmatic segments with small extension into the inferolateral wall compatible with ischemia, post-rest EF 38% with global hypokinesis  1.4 left heart cath 12/9/2022-left main mild irregularities, LAD 60 to 70% stenosis, left circumflex 20-30 mid and distal, right coronary artery 99% stenosis, marginal branch 95 to 90% stenosis, FFR 0.78, hemodynamic significant LAD, subtotal occlusion of the mid RCA, consideration for CABG due to the fact patient is diabetic  2.  Hypertension  3.  Hyperlipidemia  4.  Palpitations  4.1 event monitor 6/10-6/23/2021-PVCs, NSVT (5 beat)  5.  Shortness of breath  5.1 Echo 10/26/2021-EF 60 to 65%, diastolic dysfunction 1  5.2 Echo 12/6/2022-EF 61 to 65%, moderate to severe LVH, diastolic dysfunction 2  6.  Family history of early CAD; Brother first MI @ 38; DAD CABG @ 49  7.  DARON, CPAP  8.  Diabetes Mellitus II; recently dx 2021  9.  Smoking Habituation  10.  Renal artery ultrasound 6/21/2021-no hemodynamically significant renal artery stenosis    Patient is a 47-year-old male who presents today for follow-up status post left heart cath with Dr. Carlson.  He denies any chest pain, pressure, palpitations, fluttering, dizziness, presyncope, syncope,  orthopnea or PND.  He does get swelling in his legs he says typically if he has been on them all day but will go down over night.  Patient denies any shortness of breath.    We went over his left heart cath.  He is very much so receptive and having bypass surgery.  Dr. Walker's office is already called him and that is why his blood pressure is high because he was a little anxious on the way in.  Patient's right radial wrist site is unremarkable.    Patient has a very strong family history of heart disease.  Dad had first CABG x 3 at 49, his brother had his first MI at 38 and he has a cousin that has an LVAD.      Current Outpatient Medications on File Prior to Visit   Medication Sig Dispense Refill   • amLODIPine (NORVASC) 10 MG tablet Take 10 mg by mouth Daily.     • aspirin (aspirin) 81 MG EC tablet Take 1 tablet by mouth Daily. 90 tablet 3   • atenolol (TENORMIN) 25 MG tablet Take 0.5 tablets by mouth Daily. 30 tablet 11   • cloNIDine (Catapres) 0.1 MG tablet Take 1 tablet by mouth 3 (Three) Times a Day As Needed for High Blood Pressure (SBP > 160 or DBP > 90). 30 tablet 5   • clopidogrel (PLAVIX) 75 MG tablet Take 1 tablet by mouth Daily. 90 tablet 3   • isosorbide mononitrate (IMDUR) 30 MG 24 hr tablet Take 0.5 tablets by mouth Daily. 30 tablet 11   • metFORMIN (GLUCOPHAGE) 500 MG tablet Take 500 mg by mouth 2 (Two) Times a Day With Meals.     • nitroglycerin (NITROSTAT) 0.4 MG SL tablet 1 under the tongue as needed for angina, may repeat q5mins for up three doses 30 tablet 2   • rosuvastatin (CRESTOR) 20 MG tablet Take 1 tablet by mouth Daily. 90 tablet 3   • sacubitril-valsartan (ENTRESTO) 49-51 MG tablet Take 1 tablet by mouth 2 (Two) Times a Day. 60 tablet 11     No current facility-administered medications on file prior to visit.       ALLERGIES    Patient has no known allergies.    Past Medical History:   Diagnosis Date   • COVID-19 02/15/2022   • Diabetes mellitus (HCC)    • Hyperlipidemia    •  "Hypertension    • Sleep apnea        Social History     Socioeconomic History   • Marital status: Single   Tobacco Use   • Smoking status: Every Day     Packs/day: 0.50     Years: 30.00     Pack years: 15.00     Types: Cigarettes   • Smokeless tobacco: Never   Vaping Use   • Vaping Use: Never used   Substance and Sexual Activity   • Alcohol use: Never   • Drug use: Defer   • Sexual activity: Defer       Family History   Problem Relation Age of Onset   • Breast cancer Mother    • Hypertension Father    • Heart attack Father    • Heart attack Brother        Review of Systems   Constitutional: Negative for appetite change, chills, fatigue and fever.   HENT: Negative for congestion, rhinorrhea and sore throat.    Eyes: Negative for visual disturbance.   Respiratory: Positive for cough (dry ). Negative for chest tightness, shortness of breath and wheezing.    Cardiovascular: Positive for leg swelling (ankles swelling flares up at night when he has been on his legs all day ). Negative for chest pain and palpitations.   Gastrointestinal: Negative for abdominal pain, blood in stool, constipation, diarrhea, nausea and vomiting.   Genitourinary: Negative for difficulty urinating, dysuria, frequency and urgency.   Musculoskeletal: Positive for arthralgias (throguhtout the body ) and joint swelling (knees and ankles ). Negative for back pain, neck pain and neck stiffness.   Skin: Negative for color change, pallor, rash and wound.   Allergic/Immunologic: Negative for environmental allergies and food allergies.   Neurological: Positive for headaches (back of the head last night ). Negative for dizziness, syncope, weakness, light-headedness and numbness.   Hematological: Does not bruise/bleed easily.   Psychiatric/Behavioral: Positive for sleep disturbance (hard to go and hard to stay asleep ).       Objective   /89 (BP Location: Right arm, Patient Position: Sitting)   Pulse 85   Temp 97.2 °F (36.2 °C)   Ht 193 cm (76\")  " " Wt 117 kg (259 lb)   SpO2 96%   BMI 31.53 kg/m²   Vitals:    12/12/22 0850 12/12/22 0907   BP: (!) 185/98 158/89   BP Location: Left arm Right arm   Patient Position: Sitting Sitting   Pulse: 85    Temp: 97.2 °F (36.2 °C)    SpO2: 96%    Weight: 117 kg (259 lb)    Height: 193 cm (76\")       Lab Results (most recent)     None        Physical Exam  Vitals reviewed.   Constitutional:       General: He is awake.      Appearance: Normal appearance. He is well-developed and well-groomed. He is obese.   HENT:      Head: Normocephalic.   Eyes:      General: Lids are normal.   Neck:      Vascular: No carotid bruit, hepatojugular reflux or JVD.   Cardiovascular:      Rate and Rhythm: Normal rate and regular rhythm.      Pulses:           Radial pulses are 2+ on the right side and 2+ on the left side.        Dorsalis pedis pulses are 2+ on the right side and 2+ on the left side.        Posterior tibial pulses are 2+ on the right side and 2+ on the left side.      Heart sounds: Normal heart sounds.   Pulmonary:      Effort: Pulmonary effort is normal.      Breath sounds: Normal air entry. Examination of the right-lower field reveals decreased breath sounds. Examination of the left-lower field reveals decreased breath sounds. Decreased breath sounds present.   Abdominal:      General: Bowel sounds are normal.      Palpations: Abdomen is soft.   Musculoskeletal:      Right lower leg: No edema.      Left lower leg: No edema.   Skin:     General: Skin is warm and dry.   Neurological:      Mental Status: He is alert and oriented to person, place, and time.   Psychiatric:         Attention and Perception: Attention and perception normal.         Mood and Affect: Mood and affect normal.         Speech: Speech normal.         Behavior: Behavior normal. Behavior is cooperative.         Thought Content: Thought content normal.         Cognition and Memory: Cognition and memory normal.         Judgment: Judgment normal. "         Procedure   Procedures         Assessment & Plan      Diagnosis Plan   1. 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 pectoris (HCC)  Ambulatory Referral to Cardiothoracic Surgery      2. Essential hypertension  Ambulatory Referral to Cardiothoracic Surgery      3. Grade I diastolic dysfunction        4. Hyperlipidemia, unspecified hyperlipidemia type  Ambulatory Referral to Cardiothoracic Surgery      5. Ischemic cardiomyopathy  Ambulatory Referral to Cardiothoracic Surgery      6. NSVT (nonsustained ventricular tachycardia)  Ambulatory Referral to Cardiothoracic Surgery      7. PVC (premature ventricular contraction)  Ambulatory Referral to Cardiothoracic Surgery      8. Shortness of breath        9. Peripheral edema        10. Smoking  Ambulatory Referral to Cardiothoracic Surgery      11. Type 2 diabetes mellitus without complication, without long-term current use of insulin (HCC)  Ambulatory Referral to Cardiothoracic Surgery      12. Family history of early CAD  Ambulatory Referral to Cardiothoracic Surgery          Return in about 3 months (around 3/12/2023).    CAD/hypertension/hyperlipidemia/ischemic cardiomyopathy/NSVT/PVCs/type 2 diabetes/smoking/family history of early CAD-patient will see Dr. Walker on Monday.  Patient has his CD with him and I did call up to [facility] and request that they PowerShare his cath film with Dr. Walker.  Shortness of breath-resolved.  Peripheral edema-stable.  We will continue his medication regimen.  He will follow-up in 3 months or sooner if any changes.       Stone Villeda  reports that he has been smoking cigarettes. He has a 15.00 pack-year smoking history. He has never used smokeless tobacco.. I have educated him on the risk of diseases from using tobacco products such as cancer, COPD and heart disease.     I advised him to quit and he is willing to quit. We have discussed the following method/s for tobacco  cessation:  Education Material.  Together we have set a quit date for 3 weeks from today.  He will follow up with me in 3 month or sooner to check on his progress.    I spent 3  minutes counseling the patient.         Patient did not bring med list or medicine bottles to appointment, med list has been reviewed and updated based on patient's knowledge of their meds.       Electronically signed by:

## 2022-12-12 NOTE — TELEPHONE ENCOUNTER
Called to [facility] again and actually spoke with Luigi.  I did confirm they did PowerShare patient's left heart cath film with Dr. Walker.

## 2022-12-12 NOTE — TELEPHONE ENCOUNTER
Confirm with Dr. Walker he would like patient to hold Plavix starting Sunday.  I did call patient advise him of this.  I advised him that if they decide on a later date for his bypass surgery if he does have it then he may have him go back on for it for now to going hold it starting Sunday.  He gave verbal understanding.  He does understand to continue his aspirin.  I did reiterate for him to take his CD with them even to the hospital supposed be power sharing his film.

## 2022-12-13 ENCOUNTER — PREP FOR SURGERY (OUTPATIENT)
Dept: OTHER | Facility: HOSPITAL | Age: 47
End: 2022-12-13

## 2022-12-13 ENCOUNTER — HOSPITAL ENCOUNTER (OUTPATIENT)
Dept: CARDIOLOGY | Facility: HOSPITAL | Age: 47
Discharge: HOME | End: 2022-12-13

## 2022-12-13 DIAGNOSIS — E11.59 TYPE 2 DIABETES MELLITUS WITH OTHER CIRCULATORY COMPLICATION, WITH LONG-TERM CURRENT USE OF INSULIN: ICD-10-CM

## 2022-12-13 DIAGNOSIS — Z00.6 EXAMINATION FOR NORMAL COMPARISON OR CONTROL IN CLINICAL RESEARCH: ICD-10-CM

## 2022-12-13 DIAGNOSIS — I25.119 CORONARY ARTERY DISEASE INVOLVING NATIVE CORONARY ARTERY OF NATIVE HEART WITH ANGINA PECTORIS: Primary | ICD-10-CM

## 2022-12-13 DIAGNOSIS — Z79.4 TYPE 2 DIABETES MELLITUS WITH OTHER CIRCULATORY COMPLICATION, WITH LONG-TERM CURRENT USE OF INSULIN: ICD-10-CM

## 2022-12-14 RX ORDER — ASPIRIN 325 MG
325 TABLET ORAL NIGHTLY
Status: CANCELLED | OUTPATIENT
Start: 2022-12-19 | End: 2022-12-20

## 2022-12-14 RX ORDER — CHLORHEXIDINE GLUCONATE 500 MG/1
1 CLOTH TOPICAL EVERY 12 HOURS PRN
Status: CANCELLED | OUTPATIENT
Start: 2022-12-19

## 2022-12-14 RX ORDER — NITROGLYCERIN 0.4 MG/1
0.4 TABLET SUBLINGUAL
Status: CANCELLED | OUTPATIENT
Start: 2022-12-23

## 2022-12-14 RX ORDER — CHLORHEXIDINE GLUCONATE 500 MG/1
1 CLOTH TOPICAL EVERY 12 HOURS PRN
Status: CANCELLED | OUTPATIENT
Start: 2022-12-23

## 2022-12-14 RX ORDER — CHLORHEXIDINE GLUCONATE 0.12 MG/ML
15 RINSE ORAL ONCE
Status: CANCELLED | OUTPATIENT
Start: 2022-12-23 | End: 2022-12-23

## 2022-12-14 RX ORDER — CEFAZOLIN SODIUM 2 G/100ML
2 INJECTION, SOLUTION INTRAVENOUS ONCE
Status: CANCELLED | OUTPATIENT
Start: 2022-12-23 | End: 2022-12-23

## 2022-12-14 RX ORDER — ACETAMINOPHEN 500 MG
1000 TABLET ORAL ONCE
Status: CANCELLED | OUTPATIENT
Start: 2022-12-23 | End: 2022-12-23

## 2022-12-14 RX ORDER — GABAPENTIN 300 MG/1
300 CAPSULE ORAL ONCE
Status: CANCELLED | OUTPATIENT
Start: 2022-12-14 | End: 2022-12-14

## 2022-12-19 ENCOUNTER — HOSPITAL ENCOUNTER (OUTPATIENT)
Dept: GENERAL RADIOLOGY | Facility: HOSPITAL | Age: 47
Discharge: HOME | End: 2022-12-19

## 2022-12-19 ENCOUNTER — HOSPITAL ENCOUNTER (OUTPATIENT)
Dept: PULMONOLOGY | Facility: HOSPITAL | Age: 47
Discharge: HOME | End: 2022-12-19

## 2022-12-19 ENCOUNTER — OFFICE VISIT (OUTPATIENT)
Dept: CARDIAC SURGERY | Facility: CLINIC | Age: 47
End: 2022-12-19

## 2022-12-19 ENCOUNTER — PRE-ADMISSION TESTING (OUTPATIENT)
Dept: PREADMISSION TESTING | Facility: HOSPITAL | Age: 47
End: 2022-12-19

## 2022-12-19 VITALS
HEART RATE: 80 BPM | TEMPERATURE: 98.6 F | SYSTOLIC BLOOD PRESSURE: 190 MMHG | WEIGHT: 260 LBS | BODY MASS INDEX: 31.66 KG/M2 | OXYGEN SATURATION: 99 % | HEIGHT: 76 IN | DIASTOLIC BLOOD PRESSURE: 98 MMHG

## 2022-12-19 VITALS — WEIGHT: 258.49 LBS | HEIGHT: 76 IN | OXYGEN SATURATION: 98 % | BODY MASS INDEX: 31.48 KG/M2

## 2022-12-19 DIAGNOSIS — I25.119 CORONARY ARTERY DISEASE INVOLVING NATIVE CORONARY ARTERY OF NATIVE HEART WITH ANGINA PECTORIS: ICD-10-CM

## 2022-12-19 DIAGNOSIS — I25.118 CORONARY ARTERY DISEASE OF NATIVE ARTERY OF NATIVE HEART WITH STABLE ANGINA PECTORIS: Primary | ICD-10-CM

## 2022-12-19 DIAGNOSIS — Z01.89 LABORATORY TEST: Primary | ICD-10-CM

## 2022-12-19 LAB
ABO GROUP BLD: NORMAL
ALBUMIN SERPL-MCNC: 4.3 G/DL (ref 3.5–5.2)
ALBUMIN/GLOB SERPL: 1.5 G/DL
ALP SERPL-CCNC: 112 U/L (ref 39–117)
ALT SERPL W P-5'-P-CCNC: 16 U/L (ref 1–41)
AMPHET+METHAMPHET UR QL: NEGATIVE
AMPHETAMINES UR QL: NEGATIVE
ANION GAP SERPL CALCULATED.3IONS-SCNC: 11 MMOL/L (ref 5–15)
APTT PPP: 31.5 SECONDS (ref 22–39)
AST SERPL-CCNC: 17 U/L (ref 1–40)
BARBITURATES UR QL SCN: NEGATIVE
BASOPHILS # BLD AUTO: 0.05 10*3/MM3 (ref 0–0.2)
BASOPHILS NFR BLD AUTO: 0.5 % (ref 0–1.5)
BENZODIAZ UR QL SCN: NEGATIVE
BILIRUB SERPL-MCNC: 0.2 MG/DL (ref 0–1.2)
BUN SERPL-MCNC: 10 MG/DL (ref 6–20)
BUN/CREAT SERPL: 13.9 (ref 7–25)
BUPRENORPHINE SERPL-MCNC: NEGATIVE NG/ML
CALCIUM SPEC-SCNC: 9.4 MG/DL (ref 8.6–10.5)
CANNABINOIDS SERPL QL: NEGATIVE
CHLORIDE SERPL-SCNC: 103 MMOL/L (ref 98–107)
CO2 SERPL-SCNC: 23 MMOL/L (ref 22–29)
COCAINE UR QL: NEGATIVE
CREAT SERPL-MCNC: 0.72 MG/DL (ref 0.76–1.27)
DEPRECATED RDW RBC AUTO: 40.2 FL (ref 37–54)
EGFRCR SERPLBLD CKD-EPI 2021: 114.1 ML/MIN/1.73
EOSINOPHIL # BLD AUTO: 0.27 10*3/MM3 (ref 0–0.4)
EOSINOPHIL NFR BLD AUTO: 2.7 % (ref 0.3–6.2)
ERYTHROCYTE [DISTWIDTH] IN BLOOD BY AUTOMATED COUNT: 12.6 % (ref 12.3–15.4)
GLOBULIN UR ELPH-MCNC: 2.8 GM/DL
GLUCOSE SERPL-MCNC: 124 MG/DL (ref 65–99)
HBA1C MFR BLD: 6.1 % (ref 4.8–5.6)
HCT VFR BLD AUTO: 49.9 % (ref 37.5–51)
HGB BLD-MCNC: 17.1 G/DL (ref 13–17.7)
IMM GRANULOCYTES # BLD AUTO: 0.03 10*3/MM3 (ref 0–0.05)
IMM GRANULOCYTES NFR BLD AUTO: 0.3 % (ref 0–0.5)
INR PPP: 0.89 (ref 0.84–1.13)
LYMPHOCYTES # BLD AUTO: 2.31 10*3/MM3 (ref 0.7–3.1)
LYMPHOCYTES NFR BLD AUTO: 23.1 % (ref 19.6–45.3)
MAGNESIUM SERPL-MCNC: 2 MG/DL (ref 1.6–2.6)
MCH RBC QN AUTO: 30 PG (ref 26.6–33)
MCHC RBC AUTO-ENTMCNC: 34.3 G/DL (ref 31.5–35.7)
MCV RBC AUTO: 87.5 FL (ref 79–97)
METHADONE UR QL SCN: NEGATIVE
MONOCYTES # BLD AUTO: 0.82 10*3/MM3 (ref 0.1–0.9)
MONOCYTES NFR BLD AUTO: 8.2 % (ref 5–12)
NEUTROPHILS NFR BLD AUTO: 6.5 10*3/MM3 (ref 1.7–7)
NEUTROPHILS NFR BLD AUTO: 65.2 % (ref 42.7–76)
NRBC BLD AUTO-RTO: 0 /100 WBC (ref 0–0.2)
OPIATES UR QL: NEGATIVE
OXYCODONE UR QL SCN: NEGATIVE
PA ADP PRP-ACNC: 125 PRU
PCP UR QL SCN: NEGATIVE
PLATELET # BLD AUTO: 227 10*3/MM3 (ref 140–450)
PMV BLD AUTO: 10.3 FL (ref 6–12)
POTASSIUM SERPL-SCNC: 4.5 MMOL/L (ref 3.5–5.2)
PROPOXYPH UR QL: NEGATIVE
PROT SERPL-MCNC: 7.1 G/DL (ref 6–8.5)
PROTHROMBIN TIME: 11.9 SECONDS (ref 11.4–14.4)
RBC # BLD AUTO: 5.7 10*6/MM3 (ref 4.14–5.8)
RH BLD: POSITIVE
SODIUM SERPL-SCNC: 137 MMOL/L (ref 136–145)
TRICYCLICS UR QL SCN: NEGATIVE
WBC NRBC COR # BLD: 9.98 10*3/MM3 (ref 3.4–10.8)

## 2022-12-19 PROCEDURE — 85730 THROMBOPLASTIN TIME PARTIAL: CPT

## 2022-12-19 PROCEDURE — 80305 DRUG TEST PRSMV DIR OPT OBS: CPT

## 2022-12-19 PROCEDURE — 85610 PROTHROMBIN TIME: CPT

## 2022-12-19 PROCEDURE — 71046 X-RAY EXAM CHEST 2 VIEWS: CPT

## 2022-12-19 PROCEDURE — 94010 BREATHING CAPACITY TEST: CPT | Performed by: INTERNAL MEDICINE

## 2022-12-19 PROCEDURE — 86901 BLOOD TYPING SEROLOGIC RH(D): CPT

## 2022-12-19 PROCEDURE — 86900 BLOOD TYPING SEROLOGIC ABO: CPT

## 2022-12-19 PROCEDURE — 83036 HEMOGLOBIN GLYCOSYLATED A1C: CPT

## 2022-12-19 PROCEDURE — 36415 COLL VENOUS BLD VENIPUNCTURE: CPT

## 2022-12-19 PROCEDURE — 99205 OFFICE O/P NEW HI 60 MIN: CPT | Performed by: THORACIC SURGERY (CARDIOTHORACIC VASCULAR SURGERY)

## 2022-12-19 PROCEDURE — 94010 BREATHING CAPACITY TEST: CPT

## 2022-12-19 PROCEDURE — 83735 ASSAY OF MAGNESIUM: CPT

## 2022-12-19 PROCEDURE — 80053 COMPREHEN METABOLIC PANEL: CPT

## 2022-12-19 PROCEDURE — 85025 COMPLETE CBC W/AUTO DIFF WBC: CPT

## 2022-12-19 PROCEDURE — 80306 DRUG TEST PRSMV INSTRMNT: CPT

## 2022-12-19 PROCEDURE — 85576 BLOOD PLATELET AGGREGATION: CPT

## 2022-12-19 RX ORDER — GUAIFENESIN 600 MG/1
600 TABLET, EXTENDED RELEASE ORAL DAILY
Status: ON HOLD | COMMUNITY
Start: 2022-12-16 | End: 2022-12-26

## 2022-12-19 RX ORDER — ASPIRIN 325 MG
325 TABLET ORAL DAILY
COMMUNITY

## 2022-12-19 RX ORDER — ASPIRIN 325 MG
325 TABLET ORAL NIGHTLY
Status: SHIPPED | OUTPATIENT
Start: 2022-12-19 | End: 2022-12-20

## 2022-12-19 RX ORDER — ALBUTEROL SULFATE 90 UG/1
2 AEROSOL, METERED RESPIRATORY (INHALATION) EVERY 6 HOURS PRN
COMMUNITY
Start: 2022-12-16

## 2022-12-19 RX ORDER — CHLORHEXIDINE GLUCONATE 500 MG/1
1 CLOTH TOPICAL EVERY 12 HOURS PRN
Status: DISCONTINUED | OUTPATIENT
Start: 2022-12-19 | End: 2022-12-23

## 2022-12-19 NOTE — PAT
An arrival time for procedure was not provided during PAT visit. If patient had any questions or concerns about their arrival time, they were instructed to contact their surgeon/physician.  Additionally, if the patient referred to an arrival time that was acquired from their my chart account, patient was encouraged to verify that time with their surgeon/physician. Arrival times are NOT provided in Pre Admission Testing Department.    Patient to apply Chlorhexadine wipes  to surgical area (as instructed) the night before procedure and the AM of procedure. Wipes provided.    Patient instructed to drink 20 ounces of Gatorade and it needs to be completed 1 hour (for Main OR patients) or 2 hours (scheduled  section & [facility] patients) before given arrival time for procedure (NO RED Gatorade)    Patient verbalized understanding.    Patient/family provided a [facility] Heart Surgery book (if not already provided by the CT surgeon's office).  Encouraged patient and family to read the Heart Surgery book if they had not already done so.     Education during PAT visit included general perioperative instructions that are routine for all surgical patients (PAT PASS, wipes, directions to pre-op,etc.).  Additionally, a handout was provided and discussed that stressed the importance of Honesty, Incentive Spirometry use, Ambulation, and Pain control.  This handout also explained the tubes in place during immediate post op recovery.  Verbal instructions given as well.     Patient and/or family verbalized understanding of education and reinforcement was provided as needed.    Instructed patient to take 325 mg of Aspirin the night before heart surgery as per CT surgeon's order.  Patient and/or family verbalized understanding.      Bactroban to be applied to each nostril during PAT visit if surgery the following day.  If surgery NOT the following day, then Bactroban supplied to patient with instructions both  written and verbally to insert into each nares the night before surgery.    Too early to draw type and screen in PAT.  Please obtain blood bank specimen in pre-op on the day of surgery.    Patient directed to Respiratory Department after Pre Admission Testing visit for Pulmonary Function Test.    EKG FROM 11/17/2022. PT DENIES CHEST PAIN/SOA.    PT REPORTS DRY COUGH X2 WIEEKS. RECEIVED ROCEPHIN INJECTION ON 12/16/2022. HAS BEEN USING PRO AIR INHALER DAILY. COUGH IMPROVING PER PT. PT DENIES FEVER. REPORTED ABOVE TO TOSHIA HOLDEN (DR. FUNK). NO NEW ORDERS.     Patient denies any current skin issues.

## 2022-12-19 NOTE — PROGRESS NOTES
12/19/2022  Patient Information  [facility]                                                                                          [address]  [place] [address]   1975  'PCP/Referring Physician'  Amita Crook S, APRN  [phone]  No ref. provider found    Chief Complaint   Patient presents with   • Consult     Np referred for coronary artery disease,denies any symptoms other than high blood pressure.       History of Present Illness:   The patient is a 47-year-old male who was referred to me to evaluate for coronary bypass grafting surgery.  He has a history of smoking which is ongoing, a history of PVCs and history of ventricular tachycardia in the past. The catheterization demonstrated a very tight 78% proximal left anterior descending coronary lesion 30% in the circumflex and what appears to be an occluded right coronary.  He has a strong family history of coronary disease and his father has had bypass surgery a number of times and his younger brothers had multiple coronary stents. He has diabetes, himself.  He is pain-free in the office but apparently he notices some occasional peripheral edema and shortness of breath with activity.      Patient Active Problem List   Diagnosis   • Other chest pain   • Essential hypertension   • Hyperlipidemia   • Smoking   • Family history of early CAD   • Shortness of breath   • Palpitations   • DM (diabetes mellitus), type 2 (HCC)   • PVC (premature ventricular contraction)   • NSVT (nonsustained ventricular tachycardia)   • Grade I diastolic dysfunction   • Coronary artery disease of native artery of native heart with stable angina pectoris (HCC)   • Dizziness   • Abnormal stress test   • Peripheral edema   • Ischemic cardiomyopathy     Past Medical History:   Diagnosis Date   • Coronary artery disease    • COVID-19 02/15/2022   • Diabetes mellitus (HCC)    • Hyperlipidemia    • Hypertension    • Sleep apnea     NO CPAP     Past Surgical History:    Procedure Laterality Date   • CARDIAC CATHETERIZATION      X2; NO INTERVENTION   • CHOLECYSTECTOMY     • HAND SURGERY Right    • WISDOM TOOTH EXTRACTION         Current Outpatient Medications:   •  amLODIPine (NORVASC) 10 MG tablet, Take 10 mg by mouth Daily., Disp: , Rfl:   •  atenolol (TENORMIN) 25 MG tablet, Take 0.5 tablets by mouth Daily., Disp: 30 tablet, Rfl: 11  •  cloNIDine (Catapres) 0.1 MG tablet, Take 1 tablet by mouth 3 (Three) Times a Day As Needed for High Blood Pressure (SBP > 160 or DBP > 90)., Disp: 30 tablet, Rfl: 5  •  clopidogrel (PLAVIX) 75 MG tablet, Take 1 tablet by mouth Daily. HOLD STARTING 12/18/2022, Disp: 90 tablet, Rfl: 3  •  isosorbide mononitrate (IMDUR) 30 MG 24 hr tablet, Take 0.5 tablets by mouth Daily., Disp: 30 tablet, Rfl: 11  •  metFORMIN (GLUCOPHAGE) 500 MG tablet, Take 500 mg by mouth 2 (Two) Times a Day With Meals. NOT CURRENTLY TAKING, Disp: , Rfl:   •  nitroglycerin (NITROSTAT) 0.4 MG SL tablet, 1 under the tongue as needed for angina, may repeat q5mins for up three doses (Patient taking differently: Place 0.4 mg under the tongue Every 5 (Five) Minutes As Needed. 1 under the tongue as needed for angina, may repeat q5mins for up three doses PT HAS NEVER TAKEN), Disp: 30 tablet, Rfl: 2  •  rosuvastatin (CRESTOR) 20 MG tablet, Take 1 tablet by mouth Daily., Disp: 90 tablet, Rfl: 3  •  sacubitril-valsartan (ENTRESTO) 49-51 MG tablet, Take 1 tablet by mouth 2 (Two) Times a Day., Disp: 60 tablet, Rfl: 11  •  aspirin 325 MG tablet, Take 325 mg by mouth Daily. PT CURRENTLY TAKING THE 325MG, Disp: , Rfl:   •  Mucus Relief 600 MG 12 hr tablet, Take 600 mg by mouth Daily., Disp: , Rfl:   •  Ventolin  (90 Base) MCG/ACT inhaler, Inhale 2 puffs Every 6 (Six) Hours As Needed for Wheezing. CURRENTLY USING DAILY FOR IMPROVING COUGH, Disp: , Rfl:   No current facility-administered medications for this visit.    Facility-Administered Medications Ordered in Other Visits:   •  aspirin  tablet 325 mg, 325 mg, Oral, Nightly, Libertad Jones APRN  •  Chlorhexidine Gluconate Cloth 2 % pads 1 application, 1 application, Topical, Q12H PRN, Libertad Jones APRN  •  mupirocin (BACTROBAN) 2 % nasal ointment 1 application, 1 application, Each Nare, Q12H, Libertad Jones APRN  No Known Allergies  Social History     Socioeconomic History   • Marital status: Single   • Number of children: 1   Tobacco Use   • Smoking status: Every Day     Packs/day: 1.00     Years: 30.00     Pack years: 30.00     Types: Cigarettes   • Smokeless tobacco: Never   Vaping Use   • Vaping Use: Never used   Substance and Sexual Activity   • Alcohol use: Never   • Drug use: Not Currently   • Sexual activity: Defer     Family History   Problem Relation Age of Onset   • Breast cancer Mother    • Hypertension Father    • Heart attack Father    • Heart attack Brother      Review of Systems   Constitutional: Negative. Negative for chills, fever, malaise/fatigue, night sweats and weight loss.   HENT: Positive for congestion. Negative for hearing loss, odynophagia and sore throat.    Cardiovascular: Negative.  Negative for chest pain, dyspnea on exertion, leg swelling, orthopnea and palpitations.   Respiratory: Positive for cough. Negative for hemoptysis.    Endocrine: Negative.  Negative for cold intolerance, heat intolerance, polydipsia, polyphagia and polyuria.   Hematologic/Lymphatic: Negative.  Does not bruise/bleed easily.   Skin: Negative.  Negative for itching and rash.   Musculoskeletal: Positive for joint pain. Negative for joint swelling and myalgias.   Gastrointestinal: Negative.  Negative for abdominal pain, constipation, diarrhea, hematemesis, hematochezia, melena, nausea and vomiting.   Genitourinary: Negative.  Negative for dysuria, frequency and hematuria.   Neurological: Negative.  Negative for focal weakness, headaches, numbness and seizures.   Psychiatric/Behavioral: Negative.  Negative for suicidal ideas.  "  Allergic/Immunologic: Negative.    All other systems reviewed and are negative.    Vitals:    12/19/22 0658   BP: (!) 190/98   BP Location: Right arm   Patient Position: Sitting   Pulse: 80   Temp: 98.6 °F (37 °C)   SpO2: 99%   Weight: 118 kg (260 lb)   Height: 193 cm (76\")      Physical Exam   CONSTITUTIONAL: Alert and conversant, Well dressed, Well nourished, No acute distress  EYES: Sclera clean, Anicteric, Pupils equal  ENT: No nasal deviation, Trachea midline  NECK: No neck masses, Supple  LUNGS: No wheezing, Cough, non-congested  HEART: No rubs, No murmurs  GASTROINTESTINAL: Soft, non-distended, No masses, Non tender  to palpation, normal bowel sounds  NEURO: No motor deficits, No sensory deficits, Cranial Nerves 2 through 12 grossly intact  PSYCHIATRIC: Oriented to person, place and time, No memory deficits, Mood appropriate  VASCULAR: No carotid bruits, Femoral pulses palpable and symmetric  MUSKULOSKELETAL: No extremity trauma or extremity asymmetry    The ROS, past medical history, surgical history, family history, social history and vitals were reviewed by myself and corrected as needed.      Labs/Imaging:  I reviewed the cardiac catheterization images.  I somewhat disagree with the report regarding the right coronary.  It says there is a 99% stenosis in the right coronary artery.  I I think the right coronary is actually occluded and that the vessel they are seeing distal to this stenosis is actually in the acute marginal branch. I also believe the vessel they are calling the marginal branch up above is not the marginal branch.  The patient is an active ongoing smoker and I have discussed cessation but we  will try to use his hospitalization to discontinue smoking.  Denies advanced directive    Assessment/Plan:  The patient is a 47-year-old male who has a strong family history of coronary disease and has diabetes as well as ongoing tobacco abuse.  He has substernal chest pain, shortness of breath and " occasional peripheral edema.  He also has had ventricular tachycardia in the past. I plan for coronary bypass grafting surgery.  I do not see a good target on the distal right but  there may be one there.  If so, this is certainly worthy of grafting.  Clearly, the left anterior descending has significant proximal disease.  The patient understands that surgery has with it the risks of stroke, bleeding, infection and death.  No guarantees were made as to the outcome.  I have indicated the typical stay is 4 days to 5 days but that could change based upon other circumstances.  I have also explained that he will not be able to engage in strenuous activity for 8 to 10 weeks.  HYPERTENSION: The patient has known hypertension. I will manage the blood pressure closely intraoperatively and postoperatively to maintain end organ perfusion, but also to mitigate against bleeding caused by extreme hypertension.  Will evaluate for beta blockade prior to anesthesia. Hypertension postoperatively will complicate bleeding problems.  HYPERLIPIDEMIA: The patient's statin medication will be continued up to and including the day of surgery. Dietary changes have been discussed.  DIABETES: I will manage the patient's blood sugars closely, both intraoperatively and postoperatively.  This patient may require a continuous insulin infusion to maintain strict serum glucose control.  I will coordinate the patient's glucose management with the hospital endocrinologist or hospitalist service if the management becomes problematic. The patient is aware that diabetes can complicate their postoperative course and contribute to infection or wound- healing issues.    Patient Active Problem List   Diagnosis   • Other chest pain   • Essential hypertension   • Hyperlipidemia   • Smoking   • Family history of early CAD   • Shortness of breath   • Palpitations   • DM (diabetes mellitus), type 2 (HCC)   • PVC (premature ventricular contraction)   • NSVT  (nonsustained ventricular tachycardia)   • Grade I diastolic dysfunction   • Coronary artery disease of native artery of native heart with stable angina pectoris (HCC)   • Dizziness   • Abnormal stress test   • Peripheral edema   • Ischemic cardiomyopathy       CC: ELEANOR Clements editing for Norris Walker MD      I, Norris Walker MD, have read and agree with the editing done by Loan Parkinson, .

## 2022-12-20 LAB
COTININE UR QL SCN: POSITIVE NG/ML
Lab: ABNORMAL

## 2022-12-21 NOTE — STS RISK SCORE
STS Adult Cardiac Surgery Database Version 4.20  RISK SCORES  Procedure: Isolated CAB  Risk of Mortality:  0.207%  Renal Failure:  0.222%  Permanent Stroke:  0.207%  Prolonged Ventilation:  1.928%  DSW Infection:  0.106%  Reoperation:  1.190%  Morbidity or Mortality:  3.249%  Short Length of Stay:  82.834%  Long Length of Stay:  0.734%

## 2022-12-22 ENCOUNTER — ANESTHESIA EVENT (OUTPATIENT)
Dept: PERIOP | Facility: HOSPITAL | Age: 47
DRG: 235 | End: 2022-12-22
Payer: COMMERCIAL

## 2022-12-22 RX ORDER — SODIUM CHLORIDE 9 MG/ML
40 INJECTION, SOLUTION INTRAVENOUS AS NEEDED
Status: CANCELLED | OUTPATIENT
Start: 2022-12-22

## 2022-12-22 RX ORDER — SODIUM CHLORIDE 0.9 % (FLUSH) 0.9 %
10 SYRINGE (ML) INJECTION AS NEEDED
Status: CANCELLED | OUTPATIENT
Start: 2022-12-22

## 2022-12-22 RX ORDER — FAMOTIDINE 10 MG/ML
20 INJECTION, SOLUTION INTRAVENOUS ONCE
Status: CANCELLED | OUTPATIENT
Start: 2022-12-22 | End: 2022-12-22

## 2022-12-22 RX ORDER — SODIUM CHLORIDE 0.9 % (FLUSH) 0.9 %
10 SYRINGE (ML) INJECTION EVERY 12 HOURS SCHEDULED
Status: CANCELLED | OUTPATIENT
Start: 2022-12-22

## 2022-12-22 NOTE — ANESTHESIA PREPROCEDURE EVALUATION
Anesthesia Evaluation     Patient summary reviewed and Nursing notes reviewed   no history of anesthetic complications:  NPO Solid Status: > 8 hours  NPO Liquid Status: > 2 hours           Airway   Mallampati: II  TM distance: >3 FB  Neck ROM: full  No difficulty expected  Dental - normal exam     Pulmonary - normal exam    breath sounds clear to auscultation  (+) a smoker Current, sleep apnea,   Cardiovascular - normal exam    ECG reviewed  Rhythm: regular  Rate: normal    (+) hypertension, CAD,       Neuro/Psych  GI/Hepatic/Renal/Endo    (+) obesity,   diabetes mellitus type 2,     Musculoskeletal     Abdominal    Substance History      OB/GYN          Other                        Anesthesia Plan    ASA 4     general, Teresa and CVL     intravenous induction   Postoperative Plan: Expected vent after surgery  Anesthetic plan, risks, benefits, and alternatives have been provided, discussed and informed consent has been obtained with: patient.        CODE STATUS:

## 2022-12-23 ENCOUNTER — ANESTHESIA (OUTPATIENT)
Dept: PERIOP | Facility: HOSPITAL | Age: 47
DRG: 235 | End: 2022-12-23
Payer: COMMERCIAL

## 2022-12-23 ENCOUNTER — ANESTHESIA EVENT CONVERTED (OUTPATIENT)
Dept: ANESTHESIOLOGY | Facility: HOSPITAL | Age: 47
DRG: 235 | End: 2022-12-23
Payer: COMMERCIAL

## 2022-12-23 ENCOUNTER — APPOINTMENT (OUTPATIENT)
Dept: GENERAL RADIOLOGY | Facility: HOSPITAL | Age: 47
DRG: 235 | End: 2022-12-23
Payer: COMMERCIAL

## 2022-12-23 ENCOUNTER — HOSPITAL ENCOUNTER (INPATIENT)
Facility: HOSPITAL | Age: 47
LOS: 7 days | Discharge: HOME | DRG: 235 | End: 2022-12-30
Attending: THORACIC SURGERY (CARDIOTHORACIC VASCULAR SURGERY) | Admitting: THORACIC SURGERY (CARDIOTHORACIC VASCULAR SURGERY)
Payer: COMMERCIAL

## 2022-12-23 DIAGNOSIS — I25.119 CORONARY ARTERY DISEASE INVOLVING NATIVE CORONARY ARTERY OF NATIVE HEART WITH ANGINA PECTORIS: ICD-10-CM

## 2022-12-23 DIAGNOSIS — I25.118 CORONARY ARTERY DISEASE OF NATIVE ARTERY OF NATIVE HEART WITH STABLE ANGINA PECTORIS: Primary | ICD-10-CM

## 2022-12-23 LAB
ABO GROUP BLD: NORMAL
ALBUMIN SERPL-MCNC: 3.9 G/DL (ref 3.5–5.2)
ALBUMIN SERPL-MCNC: 4 G/DL (ref 3.5–5.2)
ANION GAP SERPL CALCULATED.3IONS-SCNC: 8 MMOL/L (ref 5–15)
ANION GAP SERPL CALCULATED.3IONS-SCNC: 9 MMOL/L (ref 5–15)
APTT PPP: 31.9 SECONDS (ref 22–39)
ARTERIAL PATENCY WRIST A: ABNORMAL
ARTERIAL PATENCY WRIST A: ABNORMAL
ATMOSPHERIC PRESS: ABNORMAL MM[HG]
ATMOSPHERIC PRESS: ABNORMAL MM[HG]
BASE EXCESS BLDA CALC-SCNC: -1.4 MMOL/L (ref 0–2)
BASE EXCESS BLDA CALC-SCNC: -2.1 MMOL/L (ref 0–2)
BDY SITE: ABNORMAL
BDY SITE: ABNORMAL
BLD GP AB SCN SERPL QL: NEGATIVE
BODY TEMPERATURE: 37 C
BODY TEMPERATURE: 37 C
BUN SERPL-MCNC: 7 MG/DL (ref 6–20)
BUN SERPL-MCNC: 8 MG/DL (ref 6–20)
BUN/CREAT SERPL: 10.4 (ref 7–25)
BUN/CREAT SERPL: 8.9 (ref 7–25)
CA-I SERPL ISE-MCNC: 1.34 MMOL/L (ref 1.12–1.32)
CALCIUM SPEC-SCNC: 8.1 MG/DL (ref 8.6–10.5)
CALCIUM SPEC-SCNC: 9.1 MG/DL (ref 8.6–10.5)
CHLORIDE SERPL-SCNC: 104 MMOL/L (ref 98–107)
CHLORIDE SERPL-SCNC: 106 MMOL/L (ref 98–107)
CO2 BLDA-SCNC: 25.4 MMOL/L (ref 22–33)
CO2 BLDA-SCNC: 26.5 MMOL/L (ref 22–33)
CO2 SERPL-SCNC: 23 MMOL/L (ref 22–29)
CO2 SERPL-SCNC: 24 MMOL/L (ref 22–29)
COHGB MFR BLD: 1.3 % (ref 0–2)
COHGB MFR BLD: 2.2 % (ref 0–2)
CREAT SERPL-MCNC: 0.77 MG/DL (ref 0.76–1.27)
CREAT SERPL-MCNC: 0.79 MG/DL (ref 0.76–1.27)
DEPRECATED RDW RBC AUTO: 41 FL (ref 37–54)
DEPRECATED RDW RBC AUTO: 41.3 FL (ref 37–54)
EGFRCR SERPLBLD CKD-EPI 2021: 111 ML/MIN/1.73
EGFRCR SERPLBLD CKD-EPI 2021: 111.8 ML/MIN/1.73
EPAP: 0
EPAP: 0
ERYTHROCYTE [DISTWIDTH] IN BLOOD BY AUTOMATED COUNT: 12.7 % (ref 12.3–15.4)
ERYTHROCYTE [DISTWIDTH] IN BLOOD BY AUTOMATED COUNT: 12.9 % (ref 12.3–15.4)
GLUCOSE BLDC GLUCOMTR-MCNC: 104 MG/DL (ref 70–130)
GLUCOSE BLDC GLUCOMTR-MCNC: 111 MG/DL (ref 70–130)
GLUCOSE BLDC GLUCOMTR-MCNC: 125 MG/DL (ref 70–130)
GLUCOSE BLDC GLUCOMTR-MCNC: 127 MG/DL (ref 70–130)
GLUCOSE BLDC GLUCOMTR-MCNC: 133 MG/DL (ref 70–130)
GLUCOSE BLDC GLUCOMTR-MCNC: 145 MG/DL (ref 70–130)
GLUCOSE BLDC GLUCOMTR-MCNC: 153 MG/DL (ref 70–130)
GLUCOSE BLDC GLUCOMTR-MCNC: 179 MG/DL (ref 70–130)
GLUCOSE BLDC GLUCOMTR-MCNC: 180 MG/DL (ref 70–130)
GLUCOSE BLDC GLUCOMTR-MCNC: 183 MG/DL (ref 70–130)
GLUCOSE SERPL-MCNC: 170 MG/DL (ref 65–99)
GLUCOSE SERPL-MCNC: 179 MG/DL (ref 65–99)
HCO3 BLDA-SCNC: 24 MMOL/L (ref 20–26)
HCO3 BLDA-SCNC: 25.1 MMOL/L (ref 20–26)
HCT VFR BLD AUTO: 34.8 % (ref 37.5–51)
HCT VFR BLD AUTO: 41.5 % (ref 37.5–51)
HCT VFR BLD CALC: 38.5 % (ref 38–51)
HCT VFR BLD CALC: 44.4 % (ref 38–51)
HGB BLD-MCNC: 12 G/DL (ref 13–17.7)
HGB BLD-MCNC: 14 G/DL (ref 13–17.7)
HGB BLDA-MCNC: 12.6 G/DL (ref 13.5–17.5)
HGB BLDA-MCNC: 14.5 G/DL (ref 13.5–17.5)
INHALED O2 CONCENTRATION: 100 %
INHALED O2 CONCENTRATION: 40 %
INR PPP: 1.17 (ref 0.84–1.13)
IPAP: 0
IPAP: 0
MAGNESIUM SERPL-MCNC: 1.9 MG/DL (ref 1.6–2.6)
MAGNESIUM SERPL-MCNC: 2.5 MG/DL (ref 1.6–2.6)
MCH RBC QN AUTO: 29.7 PG (ref 26.6–33)
MCH RBC QN AUTO: 30.2 PG (ref 26.6–33)
MCHC RBC AUTO-ENTMCNC: 33.7 G/DL (ref 31.5–35.7)
MCHC RBC AUTO-ENTMCNC: 34.5 G/DL (ref 31.5–35.7)
MCV RBC AUTO: 87.4 FL (ref 79–97)
MCV RBC AUTO: 87.9 FL (ref 79–97)
METHGB BLD QL: 0.6 % (ref 0–1.5)
METHGB BLD QL: 0.8 % (ref 0–1.5)
MODALITY: ABNORMAL
MODALITY: ABNORMAL
NOTE: ABNORMAL
NOTE: ABNORMAL
OXYHGB MFR BLDV: 90.7 % (ref 94–99)
OXYHGB MFR BLDV: 95.5 % (ref 94–99)
PAW @ PEAK INSP FLOW SETTING VENT: 0 CMH2O
PAW @ PEAK INSP FLOW SETTING VENT: 0 CMH2O
PCO2 BLDA: 45.4 MM HG (ref 35–45)
PCO2 BLDA: 47.7 MM HG (ref 35–45)
PCO2 TEMP ADJ BLD: 45.4 MM HG (ref 35–48)
PCO2 TEMP ADJ BLD: 47.7 MM HG (ref 35–48)
PEEP RESPIRATORY: 5 CM[H2O]
PH BLDA: 7.33 PH UNITS (ref 7.35–7.45)
PH BLDA: 7.33 PH UNITS (ref 7.35–7.45)
PH, TEMP CORRECTED: 7.33 PH UNITS
PH, TEMP CORRECTED: 7.33 PH UNITS
PHOSPHATE SERPL-MCNC: 3.7 MG/DL (ref 2.5–4.5)
PHOSPHATE SERPL-MCNC: 4.3 MG/DL (ref 2.5–4.5)
PLATELET # BLD AUTO: 182 10*3/MM3 (ref 140–450)
PLATELET # BLD AUTO: 207 10*3/MM3 (ref 140–450)
PMV BLD AUTO: 10.3 FL (ref 6–12)
PMV BLD AUTO: 10.4 FL (ref 6–12)
PO2 BLDA: 121 MM HG (ref 83–108)
PO2 BLDA: 68.6 MM HG (ref 83–108)
PO2 TEMP ADJ BLD: 121 MM HG (ref 83–108)
PO2 TEMP ADJ BLD: 68.6 MM HG (ref 83–108)
POTASSIUM SERPL-SCNC: 3.9 MMOL/L (ref 3.5–5.2)
POTASSIUM SERPL-SCNC: 4.8 MMOL/L (ref 3.5–5.2)
PROTHROMBIN TIME: 14.8 SECONDS (ref 11.4–14.4)
PSV: 10 CMH2O
QT INTERVAL: 436 MS
QTC INTERVAL: 467 MS
RBC # BLD AUTO: 3.98 10*6/MM3 (ref 4.14–5.8)
RBC # BLD AUTO: 4.72 10*6/MM3 (ref 4.14–5.8)
RH BLD: POSITIVE
SODIUM SERPL-SCNC: 136 MMOL/L (ref 136–145)
SODIUM SERPL-SCNC: 138 MMOL/L (ref 136–145)
T&S EXPIRATION DATE: NORMAL
TOTAL RATE: 0 BREATHS/MINUTE
TOTAL RATE: 16 BREATHS/MINUTE
VENTILATOR MODE: ABNORMAL
VT ON VENT VENT: 0.7 ML
WBC NRBC COR # BLD: 13.63 10*3/MM3 (ref 3.4–10.8)
WBC NRBC COR # BLD: 18.08 10*3/MM3 (ref 3.4–10.8)

## 2022-12-23 PROCEDURE — 06BP4ZZ EXCISION OF RIGHT SAPHENOUS VEIN, PERCUTANEOUS ENDOSCOPIC APPROACH: ICD-10-PCS | Performed by: THORACIC SURGERY (CARDIOTHORACIC VASCULAR SURGERY)

## 2022-12-23 PROCEDURE — 94799 UNLISTED PULMONARY SVC/PX: CPT

## 2022-12-23 PROCEDURE — 25010000002 CEFAZOLIN PER 500 MG: Performed by: THORACIC SURGERY (CARDIOTHORACIC VASCULAR SURGERY)

## 2022-12-23 PROCEDURE — 25010000002 MIDAZOLAM PER 1 MG: Performed by: ANESTHESIOLOGY

## 2022-12-23 PROCEDURE — 25010000002 ALBUMIN HUMAN 5% PER 50 ML: Performed by: STUDENT IN AN ORGANIZED HEALTH CARE EDUCATION/TRAINING PROGRAM

## 2022-12-23 PROCEDURE — 25010000002 PROPOFOL 10 MG/ML EMULSION: Performed by: STUDENT IN AN ORGANIZED HEALTH CARE EDUCATION/TRAINING PROGRAM

## 2022-12-23 PROCEDURE — 36430 TRANSFUSION BLD/BLD COMPNT: CPT

## 2022-12-23 PROCEDURE — 93005 ELECTROCARDIOGRAM TRACING: CPT | Performed by: STUDENT IN AN ORGANIZED HEALTH CARE EDUCATION/TRAINING PROGRAM

## 2022-12-23 PROCEDURE — P9035 PLATELET PHERES LEUKOREDUCED: HCPCS

## 2022-12-23 PROCEDURE — 82947 ASSAY GLUCOSE BLOOD QUANT: CPT

## 2022-12-23 PROCEDURE — 33508 ENDOSCOPIC VEIN HARVEST: CPT | Performed by: THORACIC SURGERY (CARDIOTHORACIC VASCULAR SURGERY)

## 2022-12-23 PROCEDURE — 25010000002 CEFAZOLIN IN DEXTROSE 2-4 GM/100ML-% SOLUTION: Performed by: STUDENT IN AN ORGANIZED HEALTH CARE EDUCATION/TRAINING PROGRAM

## 2022-12-23 PROCEDURE — 25010000002 PROTAMINE SULFATE PER 10 MG: Performed by: STUDENT IN AN ORGANIZED HEALTH CARE EDUCATION/TRAINING PROGRAM

## 2022-12-23 PROCEDURE — 71045 X-RAY EXAM CHEST 1 VIEW: CPT

## 2022-12-23 PROCEDURE — 99222 1ST HOSP IP/OBS MODERATE 55: CPT | Performed by: INTERNAL MEDICINE

## 2022-12-23 PROCEDURE — P9100 PATHOGEN TEST FOR PLATELETS: HCPCS

## 2022-12-23 PROCEDURE — 82330 ASSAY OF CALCIUM: CPT

## 2022-12-23 PROCEDURE — 82805 BLOOD GASES W/O2 SATURATION: CPT

## 2022-12-23 PROCEDURE — 25010000002 FENTANYL CITRATE (PF) 250 MCG/5ML SOLUTION: Performed by: ANESTHESIOLOGY

## 2022-12-23 PROCEDURE — 25810000003 DEXTROSE 5 % WITH KCL 20 MEQ 20-5 MEQ/L-% SOLUTION: Performed by: STUDENT IN AN ORGANIZED HEALTH CARE EDUCATION/TRAINING PROGRAM

## 2022-12-23 PROCEDURE — 93010 ELECTROCARDIOGRAM REPORT: CPT | Performed by: INTERNAL MEDICINE

## 2022-12-23 PROCEDURE — 0 MAGNESIUM SULFATE 4 GM/100ML SOLUTION: Performed by: THORACIC SURGERY (CARDIOTHORACIC VASCULAR SURGERY)

## 2022-12-23 PROCEDURE — 25010000002 PROTAMINE SULFATE PER 10 MG: Performed by: ANESTHESIOLOGY

## 2022-12-23 PROCEDURE — 25010000002 PAPAVERINE PER 60 MG: Performed by: THORACIC SURGERY (CARDIOTHORACIC VASCULAR SURGERY)

## 2022-12-23 PROCEDURE — 33508 ENDOSCOPIC VEIN HARVEST: CPT | Performed by: STUDENT IN AN ORGANIZED HEALTH CARE EDUCATION/TRAINING PROGRAM

## 2022-12-23 PROCEDURE — 83050 HGB METHEMOGLOBIN QUAN: CPT

## 2022-12-23 PROCEDURE — 82962 GLUCOSE BLOOD TEST: CPT

## 2022-12-23 PROCEDURE — 25010000002 AMIODARONE IN DEXTROSE 5% 360-4.14 MG/200ML-% SOLUTION: Performed by: ANESTHESIOLOGY

## 2022-12-23 PROCEDURE — 86900 BLOOD TYPING SEROLOGIC ABO: CPT | Performed by: THORACIC SURGERY (CARDIOTHORACIC VASCULAR SURGERY)

## 2022-12-23 PROCEDURE — 82803 BLOOD GASES ANY COMBINATION: CPT

## 2022-12-23 PROCEDURE — 84295 ASSAY OF SERUM SODIUM: CPT

## 2022-12-23 PROCEDURE — 3E080GC INTRODUCTION OF OTHER THERAPEUTIC SUBSTANCE INTO HEART, OPEN APPROACH: ICD-10-PCS | Performed by: THORACIC SURGERY (CARDIOTHORACIC VASCULAR SURGERY)

## 2022-12-23 PROCEDURE — 021009W BYPASS CORONARY ARTERY, ONE ARTERY FROM AORTA WITH AUTOLOGOUS VENOUS TISSUE, OPEN APPROACH: ICD-10-PCS | Performed by: THORACIC SURGERY (CARDIOTHORACIC VASCULAR SURGERY)

## 2022-12-23 PROCEDURE — C1900 LEAD, CORONARY VENOUS: HCPCS | Performed by: THORACIC SURGERY (CARDIOTHORACIC VASCULAR SURGERY)

## 2022-12-23 PROCEDURE — 25010000002 HEPARIN (PORCINE) PER 1000 UNITS: Performed by: ANESTHESIOLOGY

## 2022-12-23 PROCEDURE — 85610 PROTHROMBIN TIME: CPT | Performed by: STUDENT IN AN ORGANIZED HEALTH CARE EDUCATION/TRAINING PROGRAM

## 2022-12-23 PROCEDURE — 82330 ASSAY OF CALCIUM: CPT | Performed by: STUDENT IN AN ORGANIZED HEALTH CARE EDUCATION/TRAINING PROGRAM

## 2022-12-23 PROCEDURE — 5A1221Z PERFORMANCE OF CARDIAC OUTPUT, CONTINUOUS: ICD-10-PCS | Performed by: THORACIC SURGERY (CARDIOTHORACIC VASCULAR SURGERY)

## 2022-12-23 PROCEDURE — 33517 CABG ARTERY-VEIN SINGLE: CPT | Performed by: STUDENT IN AN ORGANIZED HEALTH CARE EDUCATION/TRAINING PROGRAM

## 2022-12-23 PROCEDURE — 85347 COAGULATION TIME ACTIVATED: CPT

## 2022-12-23 PROCEDURE — 25010000002 CEFAZOLIN IN DEXTROSE 2-4 GM/100ML-% SOLUTION

## 2022-12-23 PROCEDURE — 80069 RENAL FUNCTION PANEL: CPT | Performed by: STUDENT IN AN ORGANIZED HEALTH CARE EDUCATION/TRAINING PROGRAM

## 2022-12-23 PROCEDURE — 82375 ASSAY CARBOXYHB QUANT: CPT

## 2022-12-23 PROCEDURE — 99233 SBSQ HOSP IP/OBS HIGH 50: CPT | Performed by: INTERNAL MEDICINE

## 2022-12-23 PROCEDURE — 86901 BLOOD TYPING SEROLOGIC RH(D): CPT | Performed by: THORACIC SURGERY (CARDIOTHORACIC VASCULAR SURGERY)

## 2022-12-23 PROCEDURE — C1751 CATH, INF, PER/CENT/MIDLINE: HCPCS | Performed by: ANESTHESIOLOGY

## 2022-12-23 PROCEDURE — 84132 ASSAY OF SERUM POTASSIUM: CPT

## 2022-12-23 PROCEDURE — 85014 HEMATOCRIT: CPT

## 2022-12-23 PROCEDURE — 86850 RBC ANTIBODY SCREEN: CPT | Performed by: THORACIC SURGERY (CARDIOTHORACIC VASCULAR SURGERY)

## 2022-12-23 PROCEDURE — 25010000002 PHENYLEPHRINE 10 MG/ML SOLUTION: Performed by: ANESTHESIOLOGY

## 2022-12-23 PROCEDURE — 25010000002 FENTANYL CITRATE (PF) 50 MCG/ML SOLUTION: Performed by: THORACIC SURGERY (CARDIOTHORACIC VASCULAR SURGERY)

## 2022-12-23 PROCEDURE — 33533 CABG ARTERIAL SINGLE: CPT | Performed by: THORACIC SURGERY (CARDIOTHORACIC VASCULAR SURGERY)

## 2022-12-23 PROCEDURE — 94002 VENT MGMT INPAT INIT DAY: CPT

## 2022-12-23 PROCEDURE — 83735 ASSAY OF MAGNESIUM: CPT | Performed by: STUDENT IN AN ORGANIZED HEALTH CARE EDUCATION/TRAINING PROGRAM

## 2022-12-23 PROCEDURE — 02100Z9 BYPASS CORONARY ARTERY, ONE ARTERY FROM LEFT INTERNAL MAMMARY, OPEN APPROACH: ICD-10-PCS | Performed by: THORACIC SURGERY (CARDIOTHORACIC VASCULAR SURGERY)

## 2022-12-23 PROCEDURE — 33517 CABG ARTERY-VEIN SINGLE: CPT | Performed by: THORACIC SURGERY (CARDIOTHORACIC VASCULAR SURGERY)

## 2022-12-23 PROCEDURE — 85730 THROMBOPLASTIN TIME PARTIAL: CPT | Performed by: STUDENT IN AN ORGANIZED HEALTH CARE EDUCATION/TRAINING PROGRAM

## 2022-12-23 PROCEDURE — S0260 H&P FOR SURGERY: HCPCS | Performed by: THORACIC SURGERY (CARDIOTHORACIC VASCULAR SURGERY)

## 2022-12-23 PROCEDURE — C1894 INTRO/SHEATH, NON-LASER: HCPCS | Performed by: THORACIC SURGERY (CARDIOTHORACIC VASCULAR SURGERY)

## 2022-12-23 PROCEDURE — 85027 COMPLETE CBC AUTOMATED: CPT | Performed by: STUDENT IN AN ORGANIZED HEALTH CARE EDUCATION/TRAINING PROGRAM

## 2022-12-23 PROCEDURE — 86923 COMPATIBILITY TEST ELECTRIC: CPT

## 2022-12-23 PROCEDURE — P9041 ALBUMIN (HUMAN),5%, 50ML: HCPCS | Performed by: STUDENT IN AN ORGANIZED HEALTH CARE EDUCATION/TRAINING PROGRAM

## 2022-12-23 PROCEDURE — 25010000002 HEPARIN (PORCINE) PER 1000 UNITS: Performed by: THORACIC SURGERY (CARDIOTHORACIC VASCULAR SURGERY)

## 2022-12-23 PROCEDURE — 25010000002 GENTAMICIN PER 80 MG: Performed by: THORACIC SURGERY (CARDIOTHORACIC VASCULAR SURGERY)

## 2022-12-23 PROCEDURE — 25010000002 AMIODARONE IN DEXTROSE 5% 360-4.14 MG/200ML-% SOLUTION: Performed by: THORACIC SURGERY (CARDIOTHORACIC VASCULAR SURGERY)

## 2022-12-23 PROCEDURE — 33533 CABG ARTERIAL SINGLE: CPT | Performed by: STUDENT IN AN ORGANIZED HEALTH CARE EDUCATION/TRAINING PROGRAM

## 2022-12-23 DEVICE — LIGACLIP MCA MULTIPLE CLIP APPLIERS, 20 SMALL CLIPS
Type: IMPLANTABLE DEVICE | Site: LEG | Status: FUNCTIONAL
Brand: LIGACLIP

## 2022-12-23 RX ORDER — DOPAMINE HYDROCHLORIDE 160 MG/100ML
2-20 INJECTION, SOLUTION INTRAVENOUS CONTINUOUS PRN
Status: DISCONTINUED | OUTPATIENT
Start: 2022-12-23 | End: 2022-12-24

## 2022-12-23 RX ORDER — MEPERIDINE HYDROCHLORIDE 50 MG/ML
25 INJECTION INTRAMUSCULAR; INTRAVENOUS; SUBCUTANEOUS EVERY 4 HOURS PRN
Status: DISCONTINUED | OUTPATIENT
Start: 2022-12-23 | End: 2022-12-23

## 2022-12-23 RX ORDER — PAPAVERINE HYDROCHLORIDE 30 MG/ML
INJECTION INTRAMUSCULAR; INTRAVENOUS AS NEEDED
Status: DISCONTINUED | OUTPATIENT
Start: 2022-12-23 | End: 2022-12-23 | Stop reason: HOSPADM

## 2022-12-23 RX ORDER — POTASSIUM CHLORIDE 750 MG/1
40 CAPSULE, EXTENDED RELEASE ORAL AS NEEDED
Status: DISCONTINUED | OUTPATIENT
Start: 2022-12-23 | End: 2022-12-30 | Stop reason: HOSPADM

## 2022-12-23 RX ORDER — MAGNESIUM SULFATE HEPTAHYDRATE 40 MG/ML
2 INJECTION, SOLUTION INTRAVENOUS AS NEEDED
Status: DISCONTINUED | OUTPATIENT
Start: 2022-12-23 | End: 2022-12-30 | Stop reason: HOSPADM

## 2022-12-23 RX ORDER — CHLORHEXIDINE GLUCONATE 0.12 MG/ML
15 RINSE ORAL ONCE
Status: COMPLETED | OUTPATIENT
Start: 2022-12-23 | End: 2022-12-23

## 2022-12-23 RX ORDER — MIDAZOLAM HYDROCHLORIDE 1 MG/ML
1 INJECTION INTRAMUSCULAR; INTRAVENOUS
Status: DISCONTINUED | OUTPATIENT
Start: 2022-12-23 | End: 2022-12-23 | Stop reason: HOSPADM

## 2022-12-23 RX ORDER — POTASSIUM CHLORIDE 29.8 MG/ML
20 INJECTION INTRAVENOUS
Status: DISCONTINUED | OUTPATIENT
Start: 2022-12-23 | End: 2022-12-25

## 2022-12-23 RX ORDER — SODIUM CHLORIDE 9 MG/ML
INJECTION, SOLUTION INTRAVENOUS CONTINUOUS PRN
Status: DISCONTINUED | OUTPATIENT
Start: 2022-12-23 | End: 2022-12-23 | Stop reason: SURG

## 2022-12-23 RX ORDER — FAMOTIDINE 20 MG/1
20 TABLET, FILM COATED ORAL ONCE
Status: COMPLETED | OUTPATIENT
Start: 2022-12-23 | End: 2022-12-23

## 2022-12-23 RX ORDER — MAGNESIUM SULFATE HEPTAHYDRATE 40 MG/ML
4 INJECTION, SOLUTION INTRAVENOUS AS NEEDED
Status: DISCONTINUED | OUTPATIENT
Start: 2022-12-23 | End: 2022-12-30 | Stop reason: HOSPADM

## 2022-12-23 RX ORDER — DEXTROSE MONOHYDRATE 25 G/50ML
10-50 INJECTION, SOLUTION INTRAVENOUS
Status: DISCONTINUED | OUTPATIENT
Start: 2022-12-23 | End: 2022-12-24

## 2022-12-23 RX ORDER — ASPIRIN 325 MG
325 TABLET, DELAYED RELEASE (ENTERIC COATED) ORAL DAILY
Status: DISCONTINUED | OUTPATIENT
Start: 2022-12-24 | End: 2022-12-30 | Stop reason: HOSPADM

## 2022-12-23 RX ORDER — VECURONIUM BROMIDE 1 MG/ML
INJECTION, POWDER, LYOPHILIZED, FOR SOLUTION INTRAVENOUS AS NEEDED
Status: DISCONTINUED | OUTPATIENT
Start: 2022-12-23 | End: 2022-12-23 | Stop reason: SURG

## 2022-12-23 RX ORDER — LIDOCAINE HYDROCHLORIDE 10 MG/ML
INJECTION, SOLUTION EPIDURAL; INFILTRATION; INTRACAUDAL; PERINEURAL AS NEEDED
Status: DISCONTINUED | OUTPATIENT
Start: 2022-12-23 | End: 2022-12-23 | Stop reason: SURG

## 2022-12-23 RX ORDER — MIDAZOLAM HYDROCHLORIDE 1 MG/ML
INJECTION INTRAMUSCULAR; INTRAVENOUS AS NEEDED
Status: DISCONTINUED | OUTPATIENT
Start: 2022-12-23 | End: 2022-12-23 | Stop reason: SURG

## 2022-12-23 RX ORDER — SODIUM CHLORIDE 9 MG/ML
INJECTION, SOLUTION INTRAVENOUS AS NEEDED
Status: DISCONTINUED | OUTPATIENT
Start: 2022-12-23 | End: 2022-12-23 | Stop reason: HOSPADM

## 2022-12-23 RX ORDER — POTASSIUM CHLORIDE, DEXTROSE MONOHYDRATE 150; 5 MG/100ML; G/100ML
30 INJECTION, SOLUTION INTRAVENOUS CONTINUOUS
Status: DISCONTINUED | OUTPATIENT
Start: 2022-12-23 | End: 2022-12-24

## 2022-12-23 RX ORDER — GABAPENTIN 300 MG/1
300 CAPSULE ORAL ONCE
Status: COMPLETED | OUTPATIENT
Start: 2022-12-23 | End: 2022-12-23

## 2022-12-23 RX ORDER — ONDANSETRON 2 MG/ML
4 INJECTION INTRAMUSCULAR; INTRAVENOUS EVERY 6 HOURS PRN
Status: DISCONTINUED | OUTPATIENT
Start: 2022-12-23 | End: 2022-12-30 | Stop reason: HOSPADM

## 2022-12-23 RX ORDER — CHLORHEXIDINE GLUCONATE 500 MG/1
1 CLOTH TOPICAL EVERY 12 HOURS PRN
Status: DISCONTINUED | OUTPATIENT
Start: 2022-12-23 | End: 2022-12-23 | Stop reason: HOSPADM

## 2022-12-23 RX ORDER — CHLORHEXIDINE GLUCONATE 0.12 MG/ML
15 RINSE ORAL EVERY 12 HOURS SCHEDULED
Status: DISCONTINUED | OUTPATIENT
Start: 2022-12-23 | End: 2022-12-24

## 2022-12-23 RX ORDER — AMINOCAPROIC ACID 250 MG/ML
INJECTION, SOLUTION INTRAVENOUS AS NEEDED
Status: DISCONTINUED | OUTPATIENT
Start: 2022-12-23 | End: 2022-12-23 | Stop reason: SURG

## 2022-12-23 RX ORDER — SODIUM CHLORIDE, SODIUM LACTATE, POTASSIUM CHLORIDE, CALCIUM CHLORIDE 600; 310; 30; 20 MG/100ML; MG/100ML; MG/100ML; MG/100ML
9 INJECTION, SOLUTION INTRAVENOUS CONTINUOUS
Status: DISCONTINUED | OUTPATIENT
Start: 2022-12-23 | End: 2022-12-23

## 2022-12-23 RX ORDER — NICARDIPINE HYDROCHLORIDE 2.5 MG/ML
INJECTION INTRAVENOUS CONTINUOUS PRN
Status: DISCONTINUED | OUTPATIENT
Start: 2022-12-23 | End: 2022-12-23 | Stop reason: SURG

## 2022-12-23 RX ORDER — CEFAZOLIN SODIUM 2 G/100ML
2 INJECTION, SOLUTION INTRAVENOUS ONCE
Status: COMPLETED | OUTPATIENT
Start: 2022-12-23 | End: 2022-12-23

## 2022-12-23 RX ORDER — POTASSIUM CHLORIDE 1.5 G/1.77G
40 POWDER, FOR SOLUTION ORAL AS NEEDED
Status: DISCONTINUED | OUTPATIENT
Start: 2022-12-23 | End: 2022-12-30 | Stop reason: HOSPADM

## 2022-12-23 RX ORDER — DEXMEDETOMIDINE HYDROCHLORIDE 4 UG/ML
INJECTION, SOLUTION INTRAVENOUS CONTINUOUS PRN
Status: DISCONTINUED | OUTPATIENT
Start: 2022-12-23 | End: 2022-12-23 | Stop reason: SURG

## 2022-12-23 RX ORDER — THROMBIN HUMAN AND FIBRINOGEN 2; 5.5 [USP'U]/1; MG/1
PATCH TOPICAL AS NEEDED
Status: DISCONTINUED | OUTPATIENT
Start: 2022-12-23 | End: 2022-12-23 | Stop reason: HOSPADM

## 2022-12-23 RX ORDER — ETOMIDATE 2 MG/ML
INJECTION INTRAVENOUS AS NEEDED
Status: DISCONTINUED | OUTPATIENT
Start: 2022-12-23 | End: 2022-12-23 | Stop reason: SURG

## 2022-12-23 RX ORDER — METOPROLOL TARTRATE 5 MG/5ML
2.5 INJECTION INTRAVENOUS EVERY 6 HOURS SCHEDULED
Status: DISPENSED | OUTPATIENT
Start: 2022-12-23 | End: 2022-12-24

## 2022-12-23 RX ORDER — LIDOCAINE HYDROCHLORIDE 10 MG/ML
0.5 INJECTION, SOLUTION EPIDURAL; INFILTRATION; INTRACAUDAL; PERINEURAL ONCE AS NEEDED
Status: COMPLETED | OUTPATIENT
Start: 2022-12-23 | End: 2022-12-23

## 2022-12-23 RX ORDER — NITROGLYCERIN 0.4 MG/1
0.4 TABLET SUBLINGUAL
Status: DISCONTINUED | OUTPATIENT
Start: 2022-12-23 | End: 2022-12-23 | Stop reason: HOSPADM

## 2022-12-23 RX ORDER — GLYCOPYRROLATE 0.2 MG/ML
INJECTION INTRAMUSCULAR; INTRAVENOUS AS NEEDED
Status: DISCONTINUED | OUTPATIENT
Start: 2022-12-23 | End: 2022-12-23 | Stop reason: SURG

## 2022-12-23 RX ORDER — FENTANYL CITRATE 50 UG/ML
INJECTION, SOLUTION INTRAMUSCULAR; INTRAVENOUS AS NEEDED
Status: DISCONTINUED | OUTPATIENT
Start: 2022-12-23 | End: 2022-12-23 | Stop reason: SURG

## 2022-12-23 RX ORDER — ROSUVASTATIN CALCIUM 20 MG/1
20 TABLET, COATED ORAL NIGHTLY
Status: DISCONTINUED | OUTPATIENT
Start: 2022-12-23 | End: 2022-12-30 | Stop reason: HOSPADM

## 2022-12-23 RX ORDER — CEFAZOLIN SODIUM 2 G/100ML
2 INJECTION, SOLUTION INTRAVENOUS EVERY 8 HOURS
Status: COMPLETED | OUTPATIENT
Start: 2022-12-23 | End: 2022-12-25

## 2022-12-23 RX ORDER — PROTAMINE SULFATE 10 MG/ML
INJECTION, SOLUTION INTRAVENOUS AS NEEDED
Status: DISCONTINUED | OUTPATIENT
Start: 2022-12-23 | End: 2022-12-23 | Stop reason: SURG

## 2022-12-23 RX ORDER — OXYCODONE HYDROCHLORIDE 5 MG/1
10 TABLET ORAL EVERY 4 HOURS PRN
Status: DISPENSED | OUTPATIENT
Start: 2022-12-23 | End: 2022-12-30

## 2022-12-23 RX ORDER — DOBUTAMINE HYDROCHLORIDE 100 MG/100ML
2-20 INJECTION INTRAVENOUS CONTINUOUS PRN
Status: DISCONTINUED | OUTPATIENT
Start: 2022-12-23 | End: 2022-12-24

## 2022-12-23 RX ORDER — FENTANYL CITRATE 50 UG/ML
25 INJECTION, SOLUTION INTRAMUSCULAR; INTRAVENOUS
Status: DISCONTINUED | OUTPATIENT
Start: 2022-12-23 | End: 2022-12-25

## 2022-12-23 RX ORDER — ACETAMINOPHEN 325 MG/1
650 TABLET ORAL EVERY 8 HOURS
Status: DISCONTINUED | OUTPATIENT
Start: 2022-12-23 | End: 2022-12-30 | Stop reason: HOSPADM

## 2022-12-23 RX ORDER — PROTAMINE SULFATE 10 MG/ML
50 INJECTION, SOLUTION INTRAVENOUS ONCE
Status: COMPLETED | OUTPATIENT
Start: 2022-12-23 | End: 2022-12-23

## 2022-12-23 RX ORDER — NOREPINEPHRINE BIT/0.9 % NACL 8 MG/250ML
.02-.3 INFUSION BOTTLE (ML) INTRAVENOUS CONTINUOUS PRN
Status: DISCONTINUED | OUTPATIENT
Start: 2022-12-23 | End: 2022-12-28

## 2022-12-23 RX ORDER — NICOTINE POLACRILEX 4 MG
15 LOZENGE BUCCAL
Status: DISCONTINUED | OUTPATIENT
Start: 2022-12-23 | End: 2022-12-24

## 2022-12-23 RX ORDER — DEXMEDETOMIDINE HYDROCHLORIDE 4 UG/ML
.2-1.5 INJECTION, SOLUTION INTRAVENOUS CONTINUOUS PRN
Status: DISCONTINUED | OUTPATIENT
Start: 2022-12-23 | End: 2022-12-28

## 2022-12-23 RX ORDER — ROCURONIUM BROMIDE 10 MG/ML
INJECTION, SOLUTION INTRAVENOUS AS NEEDED
Status: DISCONTINUED | OUTPATIENT
Start: 2022-12-23 | End: 2022-12-23 | Stop reason: SURG

## 2022-12-23 RX ORDER — POLYETHYLENE GLYCOL 3350 17 G/17G
17 POWDER, FOR SOLUTION ORAL DAILY PRN
Status: DISCONTINUED | OUTPATIENT
Start: 2022-12-23 | End: 2022-12-30 | Stop reason: HOSPADM

## 2022-12-23 RX ORDER — ASPIRIN 325 MG
325 TABLET ORAL ONCE
Status: COMPLETED | OUTPATIENT
Start: 2022-12-23 | End: 2022-12-23

## 2022-12-23 RX ORDER — MORPHINE SULFATE 2 MG/ML
2 INJECTION, SOLUTION INTRAMUSCULAR; INTRAVENOUS
Status: DISCONTINUED | OUTPATIENT
Start: 2022-12-23 | End: 2022-12-24

## 2022-12-23 RX ORDER — EPHEDRINE SULFATE 50 MG/ML
INJECTION INTRAVENOUS AS NEEDED
Status: DISCONTINUED | OUTPATIENT
Start: 2022-12-23 | End: 2022-12-23 | Stop reason: SURG

## 2022-12-23 RX ORDER — NITROGLYCERIN 20 MG/100ML
5-200 INJECTION INTRAVENOUS CONTINUOUS PRN
Status: DISCONTINUED | OUTPATIENT
Start: 2022-12-23 | End: 2022-12-28

## 2022-12-23 RX ORDER — ALBUMIN, HUMAN INJ 5% 5 %
250 SOLUTION INTRAVENOUS AS NEEDED
Status: COMPLETED | OUTPATIENT
Start: 2022-12-23 | End: 2022-12-24

## 2022-12-23 RX ORDER — GABAPENTIN 100 MG/1
100 CAPSULE ORAL EVERY 8 HOURS
Status: DISPENSED | OUTPATIENT
Start: 2022-12-23 | End: 2022-12-28

## 2022-12-23 RX ORDER — ATORVASTATIN CALCIUM 40 MG/1
40 TABLET, FILM COATED ORAL NIGHTLY
Status: DISCONTINUED | OUTPATIENT
Start: 2022-12-23 | End: 2022-12-23

## 2022-12-23 RX ORDER — HYDROCODONE BITARTRATE AND ACETAMINOPHEN 7.5; 325 MG/1; MG/1
1 TABLET ORAL EVERY 4 HOURS PRN
Status: DISPENSED | OUTPATIENT
Start: 2022-12-23 | End: 2022-12-30

## 2022-12-23 RX ORDER — ACETAMINOPHEN 500 MG
1000 TABLET ORAL ONCE
Status: COMPLETED | OUTPATIENT
Start: 2022-12-23 | End: 2022-12-23

## 2022-12-23 RX ORDER — BISACODYL 10 MG
10 SUPPOSITORY, RECTAL RECTAL DAILY PRN
Status: DISCONTINUED | OUTPATIENT
Start: 2022-12-23 | End: 2022-12-28 | Stop reason: SDUPTHER

## 2022-12-23 RX ORDER — PHENYLEPHRINE HYDROCHLORIDE 10 MG/ML
INJECTION INTRAVENOUS AS NEEDED
Status: DISCONTINUED | OUTPATIENT
Start: 2022-12-23 | End: 2022-12-23 | Stop reason: SURG

## 2022-12-23 RX ORDER — HEPARIN SODIUM 1000 [USP'U]/ML
INJECTION, SOLUTION INTRAVENOUS; SUBCUTANEOUS AS NEEDED
Status: DISCONTINUED | OUTPATIENT
Start: 2022-12-23 | End: 2022-12-23 | Stop reason: SURG

## 2022-12-23 RX ORDER — AMOXICILLIN 250 MG
2 CAPSULE ORAL 2 TIMES DAILY
Status: DISCONTINUED | OUTPATIENT
Start: 2022-12-23 | End: 2022-12-30 | Stop reason: HOSPADM

## 2022-12-23 RX ORDER — ALBUTEROL SULFATE 2.5 MG/3ML
2.5 SOLUTION RESPIRATORY (INHALATION) EVERY 4 HOURS PRN
Status: DISPENSED | OUTPATIENT
Start: 2022-12-23 | End: 2022-12-24

## 2022-12-23 RX ADMIN — FENTANYL CITRATE 100 MCG: 0.05 INJECTION, SOLUTION INTRAMUSCULAR; INTRAVENOUS at 07:50

## 2022-12-23 RX ADMIN — FAMOTIDINE 20 MG: 20 TABLET, FILM COATED ORAL at 06:22

## 2022-12-23 RX ADMIN — PHENYLEPHRINE HYDROCHLORIDE 100 MCG: 10 INJECTION INTRAVENOUS at 08:10

## 2022-12-23 RX ADMIN — DEXMEDETOMIDINE HYDROCHLORIDE 0.5 MCG/KG/HR: 4 INJECTION, SOLUTION INTRAVENOUS at 08:29

## 2022-12-23 RX ADMIN — FENTANYL CITRATE 200 MCG: 0.05 INJECTION, SOLUTION INTRAMUSCULAR; INTRAVENOUS at 07:17

## 2022-12-23 RX ADMIN — AMINOCAPROIC ACID 10 G: 250 INJECTION, SOLUTION INTRAVENOUS at 07:30

## 2022-12-23 RX ADMIN — FENTANYL CITRATE 50 MCG: 0.05 INJECTION, SOLUTION INTRAMUSCULAR; INTRAVENOUS at 08:28

## 2022-12-23 RX ADMIN — FENTANYL CITRATE 200 MCG: 0.05 INJECTION, SOLUTION INTRAMUSCULAR; INTRAVENOUS at 07:54

## 2022-12-23 RX ADMIN — POTASSIUM CHLORIDE AND DEXTROSE MONOHYDRATE 30 ML/HR: 150; 5 INJECTION, SOLUTION INTRAVENOUS at 11:43

## 2022-12-23 RX ADMIN — GLYCOPYRROLATE 0.2 MG: 0.2 INJECTION INTRAMUSCULAR; INTRAVENOUS at 08:00

## 2022-12-23 RX ADMIN — CHLORHEXIDINE GLUCONATE 0.12% ORAL RINSE 15 ML: 1.2 LIQUID ORAL at 06:22

## 2022-12-23 RX ADMIN — OXYCODONE 10 MG: 5 TABLET ORAL at 20:23

## 2022-12-23 RX ADMIN — NICARDIPINE HYDROCHLORIDE 15 MG/HR: 25 INJECTION INTRAVENOUS at 07:57

## 2022-12-23 RX ADMIN — DEXMEDETOMIDINE HYDROCHLORIDE 1.5 MCG/KG/HR: 4 INJECTION, SOLUTION INTRAVENOUS at 11:53

## 2022-12-23 RX ADMIN — SODIUM CHLORIDE, POTASSIUM CHLORIDE, SODIUM LACTATE AND CALCIUM CHLORIDE 9 ML/HR: 600; 310; 30; 20 INJECTION, SOLUTION INTRAVENOUS at 06:06

## 2022-12-23 RX ADMIN — FENTANYL CITRATE 25 MCG: 50 INJECTION, SOLUTION INTRAMUSCULAR; INTRAVENOUS at 14:21

## 2022-12-23 RX ADMIN — LIDOCAINE HYDROCHLORIDE 0.5 ML: 10 INJECTION, SOLUTION EPIDURAL; INFILTRATION; INTRACAUDAL; PERINEURAL at 06:06

## 2022-12-23 RX ADMIN — ETOMIDATE 20 MG: 2 INJECTION, SOLUTION INTRAVENOUS at 07:17

## 2022-12-23 RX ADMIN — AMIODARONE HYDROCHLORIDE 1 MG/MIN: 1.8 INJECTION, SOLUTION INTRAVENOUS at 09:22

## 2022-12-23 RX ADMIN — VECURONIUM BROMIDE 10 MG: 1 INJECTION, POWDER, LYOPHILIZED, FOR SOLUTION INTRAVENOUS at 08:28

## 2022-12-23 RX ADMIN — ACETAMINOPHEN 650 MG: 325 TABLET ORAL at 20:23

## 2022-12-23 RX ADMIN — ROCURONIUM BROMIDE 30 MG: 10 INJECTION, SOLUTION INTRAVENOUS at 10:07

## 2022-12-23 RX ADMIN — MAGNESIUM SULFATE HEPTAHYDRATE 4 G: 40 INJECTION, SOLUTION INTRAVENOUS at 12:55

## 2022-12-23 RX ADMIN — CEFAZOLIN SODIUM 2 G: 2 INJECTION, SOLUTION INTRAVENOUS at 09:23

## 2022-12-23 RX ADMIN — FENTANYL CITRATE 50 MCG: 0.05 INJECTION, SOLUTION INTRAMUSCULAR; INTRAVENOUS at 09:05

## 2022-12-23 RX ADMIN — ALBUMIN (HUMAN) 250 ML: 12.5 INJECTION, SOLUTION INTRAVENOUS at 14:28

## 2022-12-23 RX ADMIN — ASPIRIN 325 MG: 325 TABLET ORAL at 12:55

## 2022-12-23 RX ADMIN — SENNOSIDES AND DOCUSATE SODIUM 2 TABLET: 50; 8.6 TABLET ORAL at 20:23

## 2022-12-23 RX ADMIN — ALBUMIN (HUMAN) 250 ML: 12.5 INJECTION, SOLUTION INTRAVENOUS at 15:11

## 2022-12-23 RX ADMIN — PROPOFOL 5 MCG/KG/MIN: 10 INJECTION, EMULSION INTRAVENOUS at 11:44

## 2022-12-23 RX ADMIN — MUPIROCIN 1 APPLICATION: 20 OINTMENT TOPICAL at 06:22

## 2022-12-23 RX ADMIN — PROTAMINE SULFATE 500 MG: 10 INJECTION, SOLUTION INTRAVENOUS at 09:23

## 2022-12-23 RX ADMIN — GABAPENTIN 100 MG: 100 CAPSULE ORAL at 20:23

## 2022-12-23 RX ADMIN — CEFAZOLIN SODIUM 2 G: 2 INJECTION, SOLUTION INTRAVENOUS at 07:26

## 2022-12-23 RX ADMIN — FENTANYL CITRATE 25 MCG: 50 INJECTION, SOLUTION INTRAMUSCULAR; INTRAVENOUS at 11:37

## 2022-12-23 RX ADMIN — HEPARIN SODIUM 40000 UNITS: 1000 INJECTION, SOLUTION INTRAVENOUS; SUBCUTANEOUS at 08:09

## 2022-12-23 RX ADMIN — SODIUM CHLORIDE: 9 INJECTION, SOLUTION INTRAVENOUS at 07:26

## 2022-12-23 RX ADMIN — FENTANYL CITRATE 100 MCG: 0.05 INJECTION, SOLUTION INTRAMUSCULAR; INTRAVENOUS at 09:19

## 2022-12-23 RX ADMIN — ACETAMINOPHEN 1000 MG: 500 TABLET ORAL at 06:22

## 2022-12-23 RX ADMIN — CHLORHEXIDINE GLUCONATE 15 ML: 1.2 SOLUTION ORAL at 20:23

## 2022-12-23 RX ADMIN — GABAPENTIN 300 MG: 300 CAPSULE ORAL at 06:21

## 2022-12-23 RX ADMIN — LIDOCAINE HYDROCHLORIDE 100 MG: 20 INJECTION INTRAVENOUS at 09:15

## 2022-12-23 RX ADMIN — LIDOCAINE HYDROCHLORIDE 100 MG: 10 INJECTION, SOLUTION EPIDURAL; INFILTRATION; INTRACAUDAL; PERINEURAL at 07:17

## 2022-12-23 RX ADMIN — EPHEDRINE SULFATE 5 MG: 50 INJECTION INTRAVENOUS at 09:05

## 2022-12-23 RX ADMIN — GLYCOPYRROLATE 0.2 MG: 0.2 INJECTION INTRAMUSCULAR; INTRAVENOUS at 09:05

## 2022-12-23 RX ADMIN — INSULIN HUMAN 2.5 UNITS/HR: 1 INJECTION, SOLUTION INTRAVENOUS at 13:09

## 2022-12-23 RX ADMIN — PROTAMINE SULFATE 50 MG: 10 INJECTION, SOLUTION INTRAVENOUS at 11:42

## 2022-12-23 RX ADMIN — ROSUVASTATIN 20 MG: 20 TABLET, FILM COATED ORAL at 20:23

## 2022-12-23 RX ADMIN — AMIODARONE HYDROCHLORIDE 1 MG/MIN: 1.8 INJECTION, SOLUTION INTRAVENOUS at 15:47

## 2022-12-23 RX ADMIN — PROPOFOL 50 MCG/KG/MIN: 10 INJECTION, EMULSION INTRAVENOUS at 13:35

## 2022-12-23 RX ADMIN — DEXMEDETOMIDINE HYDROCHLORIDE 0.5 MCG/KG/HR: 4 INJECTION, SOLUTION INTRAVENOUS at 15:11

## 2022-12-23 RX ADMIN — ROCURONIUM BROMIDE 100 MG: 10 INJECTION, SOLUTION INTRAVENOUS at 07:17

## 2022-12-23 RX ADMIN — ALBUMIN (HUMAN) 250 ML: 12.5 INJECTION, SOLUTION INTRAVENOUS at 14:21

## 2022-12-23 RX ADMIN — AMINOCAPROIC ACID 10 G: 250 INJECTION, SOLUTION INTRAVENOUS at 09:23

## 2022-12-23 RX ADMIN — CEFAZOLIN SODIUM 2 G: 2 INJECTION, SOLUTION INTRAVENOUS at 17:45

## 2022-12-23 RX ADMIN — MIDAZOLAM 2 MG: 1 INJECTION INTRAMUSCULAR; INTRAVENOUS at 07:17

## 2022-12-23 NOTE — H&P
Norris Walker MD (Physician) • • Cardiothoracic Surgery  Expand All Collapse All  12/19/2022  Patient Information  Stone Villeda                                                                                          [address] [address]   1975  'PCP/Referring Physician'  ArmaanAmita, APRN  [phone]  No ref. provider found          Chief Complaint   Patient presents with   • Consult       Np referred for coronary artery disease,denies any symptoms other than high blood pressure.         History of Present Illness:   The patient is a 47-year-old male who was referred to me to evaluate for coronary bypass grafting surgery.  He has a history of smoking which is ongoing, a history of PVCs and history of ventricular tachycardia in the past. The catheterization demonstrated a very tight 78% proximal left anterior descending coronary lesion 30% in the circumflex and what appears to be an occluded right coronary.  He has a strong family history of coronary disease and his father has had bypass surgery a number of times and his younger brothers had multiple coronary stents. He has diabetes, himself.  He is pain-free in the office but apparently he notices some occasional peripheral edema and shortness of breath with activity.            Patient Active Problem List   Diagnosis   • Other chest pain   • Essential hypertension   • Hyperlipidemia   • Smoking   • Family history of early CAD   • Shortness of breath   • Palpitations   • DM (diabetes mellitus), type 2 (HCC)   • PVC (premature ventricular contraction)   • NSVT (nonsustained ventricular tachycardia)   • Grade I diastolic dysfunction   • Coronary artery disease of native artery of native heart with stable angina pectoris (HCC)   • Dizziness   • Abnormal stress test   • Peripheral edema   • Ischemic cardiomyopathy      Medical History        Past Medical History:   Diagnosis Date   • Coronary artery disease     • COVID-19  02/15/2022   • Diabetes mellitus (HCC)     • Hyperlipidemia     • Hypertension     • Sleep apnea       NO CPAP         Surgical History         Past Surgical History:   Procedure Laterality Date   • CARDIAC CATHETERIZATION         X2; NO INTERVENTION   • CHOLECYSTECTOMY       • HAND SURGERY Right     • WISDOM TOOTH EXTRACTION                Current Outpatient Medications:   •  amLODIPine (NORVASC) 10 MG tablet, Take 10 mg by mouth Daily., Disp: , Rfl:   •  atenolol (TENORMIN) 25 MG tablet, Take 0.5 tablets by mouth Daily., Disp: 30 tablet, Rfl: 11  •  cloNIDine (Catapres) 0.1 MG tablet, Take 1 tablet by mouth 3 (Three) Times a Day As Needed for High Blood Pressure (SBP > 160 or DBP > 90)., Disp: 30 tablet, Rfl: 5  •  clopidogrel (PLAVIX) 75 MG tablet, Take 1 tablet by mouth Daily. HOLD STARTING 12/18/2022, Disp: 90 tablet, Rfl: 3  •  isosorbide mononitrate (IMDUR) 30 MG 24 hr tablet, Take 0.5 tablets by mouth Daily., Disp: 30 tablet, Rfl: 11  •  metFORMIN (GLUCOPHAGE) 500 MG tablet, Take 500 mg by mouth 2 (Two) Times a Day With Meals. NOT CURRENTLY TAKING, Disp: , Rfl:   •  nitroglycerin (NITROSTAT) 0.4 MG SL tablet, 1 under the tongue as needed for angina, may repeat q5mins for up three doses (Patient taking differently: Place 0.4 mg under the tongue Every 5 (Five) Minutes As Needed. 1 under the tongue as needed for angina, may repeat q5mins for up three doses PT HAS NEVER TAKEN), Disp: 30 tablet, Rfl: 2  •  rosuvastatin (CRESTOR) 20 MG tablet, Take 1 tablet by mouth Daily., Disp: 90 tablet, Rfl: 3  •  sacubitril-valsartan (ENTRESTO) 49-51 MG tablet, Take 1 tablet by mouth 2 (Two) Times a Day., Disp: 60 tablet, Rfl: 11  •  aspirin 325 MG tablet, Take 325 mg by mouth Daily. PT CURRENTLY TAKING THE 325MG, Disp: , Rfl:   •  Mucus Relief 600 MG 12 hr tablet, Take 600 mg by mouth Daily., Disp: , Rfl:   •  Ventolin  (90 Base) MCG/ACT inhaler, Inhale 2 puffs Every 6 (Six) Hours As Needed for Wheezing. CURRENTLY  USING DAILY FOR IMPROVING COUGH, Disp: , Rfl:   No current facility-administered medications for this visit.     Facility-Administered Medications Ordered in Other Visits:   •  aspirin tablet 325 mg, 325 mg, Oral, Nightly, Libertad Jones APRN  •  Chlorhexidine Gluconate Cloth 2 % pads 1 application, 1 application, Topical, Q12H PRN, Libertad Jones APRN  •  mupirocin (BACTROBAN) 2 % nasal ointment 1 application, 1 application, Each Nare, Q12H, Libertad Jones APRN  No Known Allergies  Social History   Social History            Socioeconomic History   • Marital status: Single   • Number of children: 1   Tobacco Use   • Smoking status: Every Day       Packs/day: 1.00       Years: 30.00       Pack years: 30.00       Types: Cigarettes   • Smokeless tobacco: Never   Vaping Use   • Vaping Use: Never used   Substance and Sexual Activity   • Alcohol use: Never   • Drug use: Not Currently   • Sexual activity: Defer               Family History   Problem Relation Age of Onset   • Breast cancer Mother     • Hypertension Father     • Heart attack Father     • Heart attack Brother        Review of Systems   Constitutional: Negative. Negative for chills, fever, malaise/fatigue, night sweats and weight loss.   HENT: Positive for congestion. Negative for hearing loss, odynophagia and sore throat.    Cardiovascular: Negative.  Negative for chest pain, dyspnea on exertion, leg swelling, orthopnea and palpitations.   Respiratory: Positive for cough. Negative for hemoptysis.    Endocrine: Negative.  Negative for cold intolerance, heat intolerance, polydipsia, polyphagia and polyuria.   Hematologic/Lymphatic: Negative.  Does not bruise/bleed easily.   Skin: Negative.  Negative for itching and rash.   Musculoskeletal: Positive for joint pain. Negative for joint swelling and myalgias.   Gastrointestinal: Negative.  Negative for abdominal pain, constipation, diarrhea, hematemesis, hematochezia, melena, nausea and vomiting.  "  Genitourinary: Negative.  Negative for dysuria, frequency and hematuria.   Neurological: Negative.  Negative for focal weakness, headaches, numbness and seizures.   Psychiatric/Behavioral: Negative.  Negative for suicidal ideas.   Allergic/Immunologic: Negative.    All other systems reviewed and are negative.     Vitals       Vitals:     12/19/22 0658   BP: (!) 190/98   BP Location: Right arm   Patient Position: Sitting   Pulse: 80   Temp: 98.6 °F (37 °C)   SpO2: 99%   Weight: 118 kg (260 lb)   Height: 193 cm (76\")         Physical Exam   CONSTITUTIONAL: Alert and conversant, Well dressed, Well nourished, No acute distress  EYES: Sclera clean, Anicteric, Pupils equal  ENT: No nasal deviation, Trachea midline  NECK: No neck masses, Supple  LUNGS: No wheezing, Cough, non-congested  HEART: No rubs, No murmurs  GASTROINTESTINAL: Soft, non-distended, No masses, Non tender  to palpation, normal bowel sounds  NEURO: No motor deficits, No sensory deficits, Cranial Nerves 2 through 12 grossly intact  PSYCHIATRIC: Oriented to person, place and time, No memory deficits, Mood appropriate  VASCULAR: No carotid bruits, Femoral pulses palpable and symmetric  MUSKULOSKELETAL: No extremity trauma or extremity asymmetry     The ROS, past medical history, surgical history, family history, social history and vitals were reviewed by myself and corrected as needed.       Labs/Imaging:  I reviewed the cardiac catheterization images.  I somewhat disagree with the report regarding the right coronary.  It says there is a 99% stenosis in the right coronary artery.  I I think the right coronary is actually occluded and that the vessel they are seeing distal to this stenosis is actually in the acute marginal branch. I also believe the vessel they are calling the marginal branch up above is not the marginal branch.  The patient is an active ongoing smoker and I have discussed cessation but we  will try to use his hospitalization to discontinue " smoking.  Denies advanced directive     Assessment/Plan:  The patient is a 47-year-old male who has a strong family history of coronary disease and has diabetes as well as ongoing tobacco abuse.  He has substernal chest pain, shortness of breath and occasional peripheral edema.  He also has had ventricular tachycardia in the past. I plan for coronary bypass grafting surgery.  I do not see a good target on the distal right but  there may be one there.  If so, this is certainly worthy of grafting.  Clearly, the left anterior descending has significant proximal disease.  The patient understands that surgery has with it the risks of stroke, bleeding, infection and death.  No guarantees were made as to the outcome.  I have indicated the typical stay is 4 days to 5 days but that could change based upon other circumstances.  I have also explained that he will not be able to engage in strenuous activity for 8 to 10 weeks.  HYPERTENSION: The patient has known hypertension. I will manage the blood pressure closely intraoperatively and postoperatively to maintain end organ perfusion, but also to mitigate against bleeding caused by extreme hypertension.  Will evaluate for beta blockade prior to anesthesia. Hypertension postoperatively will complicate bleeding problems.  HYPERLIPIDEMIA: The patient's statin medication will be continued up to and including the day of surgery. Dietary changes have been discussed.  DIABETES: I will manage the patient's blood sugars closely, both intraoperatively and postoperatively.  This patient may require a continuous insulin infusion to maintain strict serum glucose control.  I will coordinate the patient's glucose management with the hospital endocrinologist or hospitalist service if the management becomes problematic. The patient is aware that diabetes can complicate their postoperative course and contribute to infection or wound- healing issues.         Patient Active Problem List  "  Diagnosis   • Other chest pain   • Essential hypertension   • Hyperlipidemia   • Smoking   • Family history of early CAD   • Shortness of breath   • Palpitations   • DM (diabetes mellitus), type 2 (HCC)   • PVC (premature ventricular contraction)   • NSVT (nonsustained ventricular tachycardia)   • Grade I diastolic dysfunction   • Coronary artery disease of native artery of native heart with stable angina pectoris (HCC)   • Dizziness   • Abnormal stress test   • Peripheral edema   • Ischemic cardiomyopathy         CC:      ELEANOR Clements editing for Norris Walker MD        I, Norris Walker MD, have read and agree with the editing done by Loan Parkinson, .        Instructions       Return for FOR CABG.   Patient declined After Visit Summary  Patient declined AVS at 12/19/2022  8:16 AM    Additional Documentation    Vitals:   /98 Important   (BP Location: Right arm, Patient Position: Sitting)   Pulse 80   Temp 98.6 °F (37 °C)   Ht 193 cm (76\")   Wt 118 kg (260 lb)   SpO2 99%   BMI 31.65 kg/m²   BSA 2.48 m²     [facility] Pre-op    Full history and physical note from office is up to date.     BP (!) 189/109 (BP Location: Left arm, Patient Position: Lying) Comment: Right arm 196/113  Pulse 81   Temp 97.8 °F (36.6 °C) (Temporal)   Resp 16   Ht 193 cm (76\")   Wt 117 kg (258 lb)   SpO2 98%   BMI 31.40 kg/m²   Patient denies allergy to contrast dye or latex  IMM:  Influenza: No  Pneumococcal: No  Tetanus: Up-to-date  Lungs: Clear to auscultation bilateral bases  Cardiovascular: S1-S2 without rubs murmurs or gallops  Abdomen: Soft, nontender, bowel sounds present throughout.    LAB Results:  Lab Results   Component Value Date    WBC 9.98 12/19/2022    HGB 17.1 12/19/2022    HCT 49.9 12/19/2022    MCV 87.5 12/19/2022     12/19/2022    NEUTROABS 6.50 12/19/2022    GLUCOSE 124 (H) 12/19/2022    BUN 10 12/19/2022    CREATININE 0.72 (L) 12/19/2022 "     12/19/2022    K 4.5 12/19/2022     12/19/2022    CO2 23.0 12/19/2022    MG 2.0 12/19/2022    CALCIUM 9.4 12/19/2022    ALBUMIN 4.30 12/19/2022    AST 17 12/19/2022    ALT 16 12/19/2022    BILITOT 0.2 12/19/2022    PTT 31.5 12/19/2022    INR 0.89 12/19/2022       Cancer Staging (if applicable)  Cancer Patient: __ yes __no __unknown__N/A; If yes, clinical stage T:__ N:__M:__, stage group or __N/A  Impression: Angina pectoris  Multivessel coronary artery disease  Hypertension  Ongoing cigarette use  Diabetes mellitus  Hyperlipidemia  Sleep apnea  History of nonsustained ventricular tachycardia  Plan: Coronary artery bypass grafting  With the above.  Patient had a history of V. tach in the past and PVCs in the past he understands he has a risk of stroke bleeding infection and death these risk of not been explained on 2 occasions and he agrees to proceed  TOSHIA Reid   12/23/22   6:32 AM EST

## 2022-12-23 NOTE — ANESTHESIA PROCEDURE NOTES
Airway  Urgency: elective    Date/Time: 12/23/2022 7:20 AM  Airway not difficult    General Information and Staff    Patient location during procedure: OR  Anesthesiologist: Aiden Lin MD    Indications and Patient Condition  Indications for airway management: airway protection    Preoxygenated: yes  MILS not maintained throughout  Mask difficulty assessment: 1 - vent by mask    Final Airway Details  Final airway type: endotracheal airway      Successful airway: ETT  Cuffed: yes   Successful intubation technique: direct laryngoscopy  Facilitating devices/methods: cricoid pressure  Endotracheal tube insertion site: oral  Blade: Leta  Blade size: 3  ETT size (mm): 8.0  Cormack-Lehane Classification: grade IIa - partial view of glottis  Placement verified by: chest auscultation and capnometry   Measured from: lips  ETT/EBT  to lips (cm): 20  Number of attempts at approach: 1  Assessment: lips, teeth, and gum same as pre-op and atraumatic intubation    Additional Comments  Negative epigastric sounds, Breath sound equal bilaterally with symmetric chest rise and fall

## 2022-12-23 NOTE — ANESTHESIA PROCEDURE NOTES
Central Line      Patient reassessed immediately prior to procedure    Patient location during procedure: OR  Start time: 12/23/2022 7:20 AM  Stop Time:12/23/2022 7:25 AM  Indications: vascular access  Staff  Anesthesiologist: Aiden Lin MD  Preanesthetic Checklist  Completed: patient identified, IV checked, site marked, risks and benefits discussed, surgical consent, monitors and equipment checked, pre-op evaluation and timeout performed  Central Line Prep  Sterile Tech:cap, gloves, gown, mask and sterile barriers  Prep: chloraprep  Patient monitoring: blood pressure monitoring, continuous pulse oximetry and EKG  Central Line Procedure  Laterality:right  Location:internal jugular  Catheter Type:Cordis and double lumen (SLIC)  Catheter Size:9 Fr  Guidance:landmark technique and palpation technique  PROCEDURE NOTE/ULTRASOUND INTERPRETATION.  Using ultrasound guidance the potential vascular sites for insertion of the catheter were visualized to determine the patency of the vessel to be used for vascular access.  After selecting the appropriate site for insertion, the needle was visualized under ultrasound being inserted into the internal jugular vein, followed by ultrasound confirmation of wire and catheter placement. There were no abnormalities seen on ultrasound; an image was taken; and the patient tolerated the procedure with no complications. Images: still images obtained, printed/placed on chart  Assessment  Post procedure:biopatch applied, line sutured, occlusive dressing applied and secured with tape  Assessement:blood return through all ports, free fluid flow, chest x-ray ordered and Sudhir Test  Complications:no  Patient Tolerance:patient tolerated the procedure well with no apparent complications

## 2022-12-23 NOTE — ANESTHESIA POSTPROCEDURE EVALUATION
Patient: Stone Villeda    Procedure Summary     Date: 12/23/22 Room / Location:  [facility] OR [address] [place] [address] [facility]    Anesthesia Start: 0708 Anesthesia Stop: 1046    Procedure: MEDIAN STERNOTOMY CORONARY ARTERY BYPASS GRAFTING X 2  UTILIZING THE LEFT INTERNAL MAMMERY GRAFT WITH EVH OF THE GREATER RIGHT SAPHENOUS VEIN (Chest) Diagnosis:       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 pectoris (HCC)      Type 2 diabetes mellitus with other circulatory complication, with long-term current use of insulin (HCC)      (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 pectoris (HCC) [I25.119])      (Type 2 diabetes mellitus with other circulatory complication, with long-term current use of insulin (HCC) [E11.59, Z79.4])    Surgeons: Norris Walker MD Provider: Aiden Lin MD    Anesthesia Type: general, Teresa, CVL ASA Status: 4          Anesthesia Type: general, Dennard, CVL    Vitals  Vitals Value Taken Time   /67 12/23/22 1046   Temp 97.9 °F (36.6 °C) 12/23/22 1046   Pulse 89 12/23/22 1046   Resp     SpO2 99 % 12/23/22 1046           Post Anesthesia Care and Evaluation    Patient location during evaluation: ICU  Patient participation: complete - patient cannot participate  Post-procedure mental status: sedated.  Pain score: 0  Pain management: adequate    Airway patency: intubated.  Anesthetic complications: No anesthetic complications  PONV Status: none  Cardiovascular status: acceptable and hemodynamically stable  Respiratory status: acceptable, ETT and ventilator  Hydration status: acceptable  No anesthesia care post op

## 2022-12-23 NOTE — OP NOTE
Operative Report    Preop Diagnosis: Coronary artery disease.  Ongoing tobacco abuse.  Tension hyperlipidemia elevated hemoglobin A1c.  History of trichomonas arrhythmias ventricular tachycardia and PVCs    Results of STS risk score discussed with patient and family  Patient is a lifelong smoker and has been unable to stop smoking and is interested in discontinuing smoking on this hospitalization  Denies advanced directive  Postoperative Diagnosis: Same      Procedure: Coronary bypass grafting x2 with endoscopic harvesting of the right great saphenous vein.  Left internal mammary artery to left descending saphenous vein graft to the posterior descending branch of the right.        Surgeons: Norris Walker MD      Assistant: Dhiraj ellison PA-C    The Assistant provided services of suctioning, irrigation and retraction.  Also, assisted in suture closure of parts of the skin incision.      Indication: Patient referred to me the above medical problems.  Was very well the risk of stroke bleed infection death and agreed to proceed.  No guarantees were made as to outcome.        Description: Supine position.  Sterile prep and drape.  Antibiotics given.  The right great saphenous vein was harvested endoscopically and median sternotomy performed.  The left internal mammary artery was harvested as a pedicle graft and the patient fully heparinized.  Cannulas in the acing aorta and right atrial appendage and patient begun on cardiopulmonary bypass.  Aorta crossclamped antegrade cardioplegia given.  For saphenous vein graft sutured inside fashion to 1-1/2 mm posterior descending branch of the right.  Left internal mammary was sutured to the left anterior descending coronary which angiographically appeared to good quality vessel but it was fairly heavily calcified throughout its entire course.  1 proximal vein graft sutured to the acing aorta and the patient was weaned from bypass without the need for pressor agents.  Good  hemostasis was obtained after protamine given the sternum was closed with wire the fashion skin was suture and the sponge and needle count reported as correct      Please note that portions of this note were completed with a voice recognition program. Efforts were made to edit the dictations, but occasionally words are mistranscribed.

## 2022-12-23 NOTE — PROGRESS NOTES
INTENSIVIST   PROGRESS NOTE     Hospital:  LOS: 0 days     Mr. Stone Villeda, 47 y.o. male is followed for a Chief Complaint of: Postoperative medical management      Subjective   LEIGHANN Villeda is a 47 y.o. male with PMH significant for HTN, dyslipidemia, T2DM (Hgb A1c 6.10), VT, ischemic cardiomyopathy, CAD with stable angina, and ongoing tobacco abuse who presents to [facility] on 12/23/2022 for an elective CABG with Dr. Walker. He was referred to Dr. Walker on 12/19/22 after abnormal stress test and LHC performed at [facility] with Dr. Carlson on 12/09/22 demonstrated multi-vessel disease, with >75% LAD occlusion, 20-30% circumflex occlusion, and 99% stenosis of the RCA. Echocardiogram on 12/06/22 showed LVEF at 61-65% with severe LVH. PFT on 12/19/22 with FVC 6.01, 97%; FEV1 4.82, 100%; BFEV1/BFVC 80.23%. Carotid duplex not available for review.    He is seen in the ICU post-operatively.    Time spent: 10 minutes  Electronically signed by ELEANOR Weston, 12/23/22, 7:17 AM EST.       Interval History:  He was taken to the OR this AM and underwent CABG x 2 by Dr. Walker. He is admitted to the ICU postoperatively on mechanical ventilation.        The patient's relevant past medical, surgical and social history were reviewed and updated in Epic as appropriate.      ROS: Unable to obtain secondary to mechanical ventilation.     Objective   O     Vitals:  Temp  Min: 97.7 °F (36.5 °C)  Max: 97.9 °F (36.6 °C)  BP  Min: 108/67  Max: 189/109  Pulse  Min: 71  Max: 89  Resp  Min: 16  Max: 16  SpO2  Min: 98 %  Max: 99 % No data recorded    Intake/Ouptut 24 hrs (7:00AM - 6:59 AM)  Intake & Output (last 3 days)       12/20 0701 12/21 0700 12/21 0701 12/22 0700 12/22 0701 12/23 0700 12/23 0701 12/24 0700    Blood    771    Total Intake(mL/kg)    771 (6.6)    Urine (mL/kg/hr)    835 (1.6)    Chest Tube    0    Total Output    835    Net    -64                  Medications  (drips):  amiodarone   Followed by  amiodarone  dexmedetomidine  dextrose 5 % with KCl 20 mEq  DOBUTamine  DOPamine  EPINEPHrine  insulin  lactated ringers, Last Rate: 9 mL/hr (12/23/22 0606)  niCARdipine  nitroglycerin  norepinephrine  phenylephrine  propofol        Mechanical Ventilator Settings:          Resp Rate (Set): 16  Pressure Support (cm H2O): 10 cm H20  FiO2 (%): 100 %  PEEP/CPAP (cm H2O): 5 cm H20    Minute Ventilation (L/min) (Obs): 9.08 L/min  Resp Rate (Observed) Vent: 16  I:E Ratio (Set): 1:2.50  I:E Ratio (Obs): 1:2.5    PIP Observed (cm H2O): 24 cm H2O  Plateau Pressure (cm H2O): 19 cm H2O    Physical Examination  Telemetry:  Normal sinus rhythm.    Constitutional:  No acute distress.  ETT in place on mechanical ventilation.    Eyes: No scleral icterus.   PERRL, EOM intact.    Neck:  Supple, FROM   Cardiovascular: Normal rate, regular and rhythm. Normal heart sounds.  No murmurs, gallop or rub.   Respiratory: No respiratory distress. Normal respiratory effort.  Normal breath sounds  Clear to auscultation  Chest tubes with scant drainage. No air leak.    Abdominal:  Soft. No masses. Nontender. No distension. No HSM.   Extremities: No digital cyanosis. No clubbing.  No peripheral edema.   Skin: No rashes, lesions or ulcers   Neurological:   Sedated.               Results from last 7 days   Lab Units 12/19/22  0836   WBC 10*3/mm3 9.98   HEMOGLOBIN g/dL 17.1   MCV fL 87.5   PLATELETS 10*3/mm3 227     Results from last 7 days   Lab Units 12/19/22  0836   SODIUM mmol/L 137   POTASSIUM mmol/L 4.5   CO2 mmol/L 23.0   CREATININE mg/dL 0.72*   GLUCOSE mg/dL 124*   MAGNESIUM mg/dL 2.0     Estimated Creatinine Clearance: 179.3 mL/min (A) (by C-G formula based on SCr of 0.72 mg/dL (L)).  Results from last 7 days   Lab Units 12/19/22  0836   ALK PHOS U/L 112   BILIRUBIN mg/dL 0.2   ALT (SGPT) U/L 16   AST (SGOT) U/L 17       Results from last 7 days   Lab Units 12/23/22  1121   PH, ARTERIAL pH units 7.330*    PCO2, ARTERIAL mm Hg 47.7*   PO2 ART mm Hg 121.0*   FIO2 % 100       Images:  Imaging Results (Last 24 Hours)     Procedure Component Value Units Date/Time    XR Chest 1 View [[id number]] Resulted: 12/23/22 1049     Updated: 12/23/22 1117            Results: Reviewed.  I reviewed the patient's new laboratory and imaging results.  I independently reviewed the patient's new images.    Medications: Reviewed.    Assessment & Plan   A / P     Mr. Villeda is a 48yo M with a history of HTN, HLD, T2DM, VT, ischemic cardiomyopathy, CAD and ongoing tobacco use who underwent CABG x 2 by Dr. Walker. He is admitted to the ICU on mechanical ventilation. He did not have any intra-operative arrhythmias. He was placed on Amiodarone infusion for prophylaxis secondary to his history of VT. Preoperative spirometry reviewed and negative for airway obstruction.       Nutrition:   NPO Diet NPO Type: Strict NPO  Advance Directives:   There are no questions and answers to display.       Active Hospital Problems    Diagnosis    • **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 pectoris (HCC)    • Coronary artery disease of native artery of native heart with stable angina pectoris (HCC)    • NSVT (nonsustained ventricular tachycardia)    • DM (diabetes mellitus), type 2 (HCC)    • Essential hypertension        Assessment / Plan:    1. Continue mechanical ventilation. Wean as tolerated.   2. Titrate vasopressors.   3. Continue Amiodarone infusion for 24 hours.   4. Insulin drip for diabetes management.   5. Chest tube management per CTS.   6. Pain control/supportive care  7. Needs smoking cessation.   8. AM labs and CXR    High risk secondary to management of mechanical ventilation.     High level of risk due to:  severe exacerbation of chronic illness and illness with threat to life or bodily function.    Plan of care and goals reviewed during interdisciplinary rounds.  I discussed the patient's  findings and my recommendations with nursing staff and primary care team      Kailyn Snider,     Intensive Care Medicine and Pulmonary Medicine

## 2022-12-23 NOTE — ANESTHESIA PROCEDURE NOTES
Arterial Line    Pre-sedation assessment completed: 12/23/2022 6:30 AM    Patient reassessed immediately prior to procedure    Patient location during procedure: pre-op  Start time: 12/23/2022 6:30 AM  Stop Time:12/23/2022 6:35 AM       Line placed for hemodynamic monitoring.  Performed By   Anesthesiologist: Aiden Lin MD   Preanesthetic Checklist  Completed: patient identified, IV checked, site marked, risks and benefits discussed, surgical consent, monitors and equipment checked, pre-op evaluation and timeout performed  Arterial Line Prep    Sterile Tech: cap, gloves and sterile barriers  Prep: ChloraPrep  Patient monitoring: blood pressure monitoring, continuous pulse oximetry and EKG  Arterial Line Procedure   Laterality:right  Location:  radial artery  Catheter size: 20 G   Guidance: ultrasound guided  PROCEDURE NOTE/ULTRASOUND INTERPRETATION.  Using ultrasound guidance the potential vascular sites for insertion of the catheter were visualized to determine the patency of the vessel to be used for vascular access.  After selecting the appropriate site for insertion, the needle was visualized under ultrasound being inserted into the radial artery, followed by ultrasound confirmation of wire and catheter placement. There were no abnormalities seen on ultrasound; an image was taken; and the patient tolerated the procedure with no complications.   Number of attempts: 1  Successful placement: yes   Post Assessment   Dressing Type: line sutured, occlusive dressing applied, secured with tape and wrist guard applied.   Complications no  Circ/Move/Sens Assessment: normal and unchanged.   Patient Tolerance: patient tolerated the procedure well with no apparent complications

## 2022-12-23 NOTE — CONSULTS
Cardiology Consult:     Stone Villeda  1975  [phone]  There is no work phone number on file.    12/23/22    DATE OF ADMISSION: 12/23/2022  [facility]    Amita Crook, APRN  [address]  / [facility] [id number]  Referring Provider: Norris Walker MD     CC: HTN  Reason for Consultation: management of HTN postoperatively    Problem List:  1. HTN  2. Coronary Artery Disease  a. left heart cath 7/19/2021 - 50% narrowing in the proximal portion of the LAD, diffuse irregularities throughout the circumflex with 30 to 50% narrowing before the PDA, right coronary artery totally occluded just proximal to the acute margin, however FFR was 0.85.  EF 40 to 45%, LVEDP 12-14  b. stress test 10/26/2021-no evidence of ischemia however there is a reversible redistribution pattern in the inferior wall compatible myocardial viability, post-rest EF 55% with global hypokinesis  c. Echo 12/6/2022-EF 61 to 65%, moderate to severe LVH, diastolic dysfunction 2  d. stress test 12/6/2022-large, dense being completely reversible defect involving the inferior basilar and diaphragmatic segments with small extension into the inferolateral wall compatible with ischemia, post-rest EF 38% with global hypokinesis  e. Left heart cath 12/9/2022-left main mild irregularities, LAD 60 to 70% stenosis, left circumflex 20-30 mid and distal, right coronary artery 99% stenosis, marginal branch 95 to 90% stenosis, FFR 0.78, hemodynamically significant LAD but this is a large vessel 7 mm too large for stenting, subtotal occlusion of the mid RCA, consideration for CABG due large caliber vessel and patient with DM  f. CABG x 2 (LIMA to LAD, SVG to PDA of the right) - Dr. Norris Walker 12/23/2022  3. NSVT  a. Event monitor 6/10-6/23/2021-PVCs, NSVT (5 beat)  4. DM2  5. HLP  6. Ongoing tobacco abuse  7. Strong Family history of CAD  8. DARON - CPAP      History of Present Illness:   Stone Villeda is a 47 year old male with above  history who underwent CAGB x 2 this morning with Dr. Walker. Cardiology is consulted this morning for management of his hypertension and history of NSVT. The patient is followed by Dr. Nicholson's cardiology office in [place]. He has known history of CAD with previous LHC in 2021 showing 50% LAD stenosis, RCA total occlusion which has been treated medically, NSVT on event monitor in 2021, HTN, HLP, DM2, tobacco abuse, and strong family history of CAD. Recently, was seen at his cardiology office with symptoms of weakness and had remarkably abnormal EKG. Repeat ischemic evaluation showed abnormal stress test with inferior basilar reversible defect and also inferolateral wall ischemia with EF 38%. He underwent LHC on 12/9/2022 with Dr. Carlson in [place] which showed 78% proximal left anterior descending coronary lesion, 30% stenosis in the circumflex and an occluded right coronary artery. He was sent to Dr. Walker for CABG, which he underwent today with LIMA to LAD and SVG to PDA on the right. Preoperative Echocardiogram on 12/06/22 showed LVEF at 61-65% with severe LVH. Currently, patient is stable, still sedated and on the ventilator. His BP is stable on Cardene, not any pressors.  He is on Amiodarone since he came back from the OR. His rhythm has been NSR without ectopy.       No Known Allergies    Prior to Admission Medications     Prescriptions Last Dose Informant Patient Reported? Taking?    atenolol (TENORMIN) 25 MG tablet 12/22/2022 Medication Bottle No Yes    Take 0.5 tablets by mouth Daily.    amLODIPine (NORVASC) 10 MG tablet 12/21/2022 Medication Bottle Yes No    Take 10 mg by mouth Daily.    aspirin 325 MG tablet 12/21/2022 Self Yes No    Take 325 mg by mouth Daily. PT CURRENTLY TAKING THE 325MG    cloNIDine (Catapres) 0.1 MG tablet More than a month Self, Medication Bottle No No    Take 1 tablet by mouth 3 (Three) Times a Day As Needed for High Blood Pressure (SBP > 160 or DBP > 90).    clopidogrel  (PLAVIX) 75 MG tablet 12/17/2022 Medication Bottle No No    Take 1 tablet by mouth Daily. HOLD STARTING 12/18/2022    isosorbide mononitrate (IMDUR) 30 MG 24 hr tablet 12/21/2022 Medication Bottle No No    Take 0.5 tablets by mouth Daily.    metFORMIN (GLUCOPHAGE) 500 MG tablet 12/19/2022 Medication Bottle Yes No    Take 500 mg by mouth 2 (Two) Times a Day With Meals. NOT CURRENTLY TAKING    Mucus Relief 600 MG 12 hr tablet 12/21/2022 Self Yes No    Take 600 mg by mouth Daily.    nitroglycerin (NITROSTAT) 0.4 MG SL tablet Unknown Self No No    1 under the tongue as needed for angina, may repeat q5mins for up three doses    Patient taking differently:  Place 0.4 mg under the tongue Every 5 (Five) Minutes As Needed. 1 under the tongue as needed for angina, may repeat q5mins for up three doses  PT HAS NEVER TAKEN    rosuvastatin (CRESTOR) 20 MG tablet 12/21/2022 Medication Bottle No No    Take 1 tablet by mouth Daily.    sacubitril-valsartan (ENTRESTO) 49-51 MG tablet 12/21/2022 Medication Bottle No No    Take 1 tablet by mouth 2 (Two) Times a Day.    Ventolin  (90 Base) MCG/ACT inhaler 12/21/2022 Self Yes No    Inhale 2 puffs Every 6 (Six) Hours As Needed for Wheezing. CURRENTLY USING DAILY FOR IMPROVING COUGH            Current Facility-Administered Medications:   •  acetaminophen (TYLENOL) tablet 650 mg, 650 mg, Oral, Q8H, Norris Walker MD  •  albumin human 5 % solution 250 mL, 250 mL, Intravenous, PRN, Sharron Enriquez PA-C  •  albuterol (PROVENTIL) nebulizer solution 0.083% 2.5 mg/3mL, 2.5 mg, Nebulization, Q4H PRN, Sharron Enriquez PA-C  •  amiodarone 360 mg in 200 mL D5W infusion, 1 mg/min, Intravenous, Continuous, Last Rate: 33.3 mL/hr at 12/23/22 1144, 1 mg/min at 12/23/22 1144 **FOLLOWED BY** amiodarone 360 mg in 200 mL D5W infusion, 0.5 mg/min, Intravenous, Continuous, Norris Walker MD  •  [START ON 12/24/2022] aspirin EC tablet 325 mg, 325 mg, Oral, Daily, Orange, Sharron,  DEMI  •  aspirin tablet 325 mg, 325 mg, Oral, Once, Sharron Enriquez PA-C  •  atorvastatin (LIPITOR) tablet 40 mg, 40 mg, Oral, Nightly, Sharron Enriquez PA-C  •  bisacodyl (DULCOLAX) suppository 10 mg, 10 mg, Rectal, Daily PRN, Norris Walker MD  •  calcium gluconate 1 g in sodium chloride 0.9 % 100 mL IVPB, 1 g, Intravenous, Once PRN **AND** calcium gluconate 6 g in sodium chloride 0.9 % 500 mL IVPB, 6 g, Intravenous, Once PRN, Sharron Enriquez PA-C  •  calcium gluconate 2 g in sodium chloride 0.9 % 100 mL IVPB, 2 g, Intravenous, Once PRN, Sharron Enriquez PA-C  •  ceFAZolin in dextrose (ANCEF) IVPB solution 2 g, 2 g, Intravenous, Q8H, Sharron Enriquez PA-C  •  chlorhexidine (PERIDEX) 0.12 % solution 15 mL, 15 mL, Mouth/Throat, Q12H, Sharron Enriquez PA-C  •  dexmedetomidine (PRECEDEX) 400 mcg in 100 mL NS infusion, 0.2-1.5 mcg/kg/hr, Intravenous, Continuous PRN, Sharron Enriquez PA-C, Last Rate: 43.9 mL/hr at 12/23/22 1153, 1.5 mcg/kg/hr at 12/23/22 1153  •  dextrose (D50W) (25 g/50 mL) IV injection 10-50 mL, 10-50 mL, Intravenous, Q15 Min PRN, Sharron Enriquez PA-C  •  dextrose (GLUTOSE) oral gel 15 g, 15 g, Oral, Q15 Min PRN, Sharron Enriquez PA-C  •  dextrose 5 % with KCl 20 mEq/L infusion, 30 mL/hr, Intravenous, Continuous, Sharron Enriquez PA-C, Last Rate: 30 mL/hr at 12/23/22 1143, 30 mL/hr at 12/23/22 1143  •  DOBUTamine (DOBUTREX) 1 mg/mL infusion, 2-20 mcg/kg/min, Intravenous, Continuous PRN, Sharron Enriquez PA-C  •  DOPamine 400 mg in 250 mL D5W infusion, 2-20 mcg/kg/min, Intravenous, Continuous PRN, Sharron Enriquez PA-C  •  EPINEPHrine 10 mg in 250 mL infusion, 0.02-0.3 mcg/kg/min, Intravenous, Continuous PRN, Sharron Enriquez PA-C  •  fentaNYL citrate (PF) (SUBLIMAZE) injection 25 mcg, 25 mcg, Intravenous, Q1H PRN, Norris Walker MD, 25 mcg at 12/23/22 1137  •  gabapentin (NEURONTIN) capsule 100 mg, 100 mg, Oral, Q8H, Norris Walker MD  •   glucagon (human recombinant) (GLUCAGEN DIAGNOSTIC) injection 1 mg, 1 mg, Intramuscular, Q15 Min PRN, Sharron Enriquez PA-C  •  HYDROcodone-acetaminophen (NORCO) 7.5-325 MG per tablet 1 tablet, 1 tablet, Oral, Q4H PRN, Norris Walker MD  •  insulin regular 1 unit/mL in 0.9% sodium chloride (Glucommander), 0-100 Units/hr, Intravenous, Titrated, Sharron Enriquez PA-C  •  lactated ringers infusion, 9 mL/hr, Intravenous, Continuous, Sharron Enriquez PA-C, Last Rate: 9 mL/hr at 12/23/22 0606, Restarted at 12/23/22 0708  •  meperidine (DEMEROL) injection 25 mg, 25 mg, Intravenous, Q4H PRN, Norris Walker MD  •  [START ON 12/24/2022] metoprolol tartrate (LOPRESSOR) half tablet 12.5 mg, 12.5 mg, Oral, Q12H, Sharron Enriquez PA-C  •  metoprolol tartrate (LOPRESSOR) injection 2.5 mg, 2.5 mg, Intravenous, Q6H, Sharron Enriquez PA-C  •  morphine injection 2 mg, 2 mg, Intravenous, Q30 Min PRN, Norris Walker MD  •  niCARdipine (CARDENE) 25mg in 250mL NS infusion, 5-15 mg/hr, Intravenous, Continuous PRN, Sharron Enriquez PA-C  •  nitroglycerin (TRIDIL) 200 mcg/ml infusion, 5-200 mcg/min, Intravenous, Continuous PRN, Sharron Enriquez PA-C  •  norepinephrine (LEVOPHED) 8 mg in 250 mL NS infusion (premix), 0.02-0.3 mcg/kg/min, Intravenous, Continuous PRN, Sharron Enriquez PA-C  •  ondansetron (ZOFRAN) injection 4 mg, 4 mg, Intravenous, Q6H PRN, Sharron Enriquez PA-C  •  oxyCODONE (ROXICODONE) immediate release tablet 10 mg, 10 mg, Oral, Q4H PRN, Norris Walker MD  •  phenylephrine (STEPHY-SYNEPHRINE) 50 mg in sodium chloride 0.9 % 250 mL infusion, 0.5-3 mcg/kg/min, Intravenous, Continuous PRN, Sharron Enriquez PA-C  •  polyethylene glycol (MIRALAX) packet 17 g, 17 g, Oral, Daily PRN, Norris Walker MD  •  potassium chloride (MICRO-K) CR capsule 40 mEq, 40 mEq, Oral, PRN **OR** potassium chloride (KLOR-CON) packet 40 mEq, 40 mEq, Oral, PRN, Sharron Enriquez PA-C  •  potassium  "chloride 20 mEq in 50 mL IVPB, 20 mEq, Intravenous, Q1H PRN **OR** potassium chloride 20 mEq in 50 mL IVPB, 20 mEq, Intravenous, Q1H PRN, Sharron Enriquez PA-C  •  propofol (DIPRIVAN) infusion 10 mg/mL 100 mL, 5-50 mcg/kg/min, Intravenous, Continuous PRN, Sharron Enriquez PA-C, Last Rate: 3.51 mL/hr at 12/23/22 1144, 5 mcg/kg/min at 12/23/22 1144  •  racemic epinephrine (RACEPINEPHRINE) nebulizer solution 0.5 mL, 0.5 mL, Nebulization, Once PRN, Sharron Enriquez PA-C  •  sennosides-docusate (PERICOLACE) 8.6-50 MG per tablet 2 tablet, 2 tablet, Oral, BID, Sharron Enriquez PA-C  •  sodium bicarbonate injection 8.4% 50 mEq, 50 mEq, Intravenous, Once PRN, Sharron Enriquez PA-C    Facility-Administered Medications Ordered in Other Encounters:   •  Chlorhexidine Gluconate Cloth 2 % pads 1 application, 1 application, Topical, Q12H PRN, Libertad Jones APRN    Social History     Socioeconomic History   • Marital status: Single   • Number of children: 1   Tobacco Use   • Smoking status: Every Day     Packs/day: 1.00     Years: 30.00     Pack years: 30.00     Types: Cigarettes   • Smokeless tobacco: Never   Vaping Use   • Vaping Use: Never used   Substance and Sexual Activity   • Alcohol use: Never   • Drug use: Not Currently   • Sexual activity: Defer       Family History   Problem Relation Age of Onset   • Breast cancer Mother    • Hypertension Father    • Heart attack Father    • Heart attack Brother        REVIEW OF SYSTEMS: not completed as patient is sedated      Vitals:    12/23/22 0612 12/23/22 1042 12/23/22 1045 12/23/22 1046   BP: (!) 189/109  121/84 108/67   BP Location: Left arm  Left arm    Patient Position: Lying  Lying    Pulse: 81  71 89   Resp: 16  16    Temp: 97.8 °F (36.6 °C)  97.7 °F (36.5 °C) 97.9 °F (36.6 °C)   TempSrc: Temporal  Bladder    SpO2: 98%  99% 99%   Weight: 117 kg (258 lb)      Height: 193 cm (76\") 193 cm (75.98\")           Vital Sign Min/Max for last 24 hours  Temp  Min: 97.7 " °F (36.5 °C)  Max: 97.9 °F (36.6 °C)   BP  Min: 108/67  Max: 189/109   Pulse  Min: 71  Max: 89   Resp  Min: 16  Max: 16   SpO2  Min: 98 %  Max: 99 %   No data recorded      Intake/Output Summary (Last 24 hours) at 12/23/2022 1156  Last data filed at 12/23/2022 1046  Gross per 24 hour   Intake 771 ml   Output 1335 ml   Net -564 ml             Physical Exam:  GEN: Well nourished, Well- developed  No acute distress  HEENT: Normocephalic, Atraumatic, PERRLA, moist mucous membranes  NECK: supple, NO JVD, no thyromegaly, no lymphadenopathy  CARDIAC: S1S2  RRR no murmur, gallop, + rub  LUNGS: Clear to ausculation, normal respiratory effort  ABDOMEN: Soft, nontender, normal bowel sounds  EXTREMITIES:No gross deformities,  No clubbing, cyanosis, or edema  SKIN: Warm, dry  NEURO: No focal deficits  PSYCHIATRIC: Normal affect and mood      I personally viewed and interpreted the patient's EKG/Telemetry/lab data    Data:   Results from last 7 days   Lab Units 12/23/22  1122 12/19/22  0836   WBC 10*3/mm3 18.08* 9.98   HEMOGLOBIN g/dL 14.0 17.1   HEMATOCRIT % 41.5 49.9   PLATELETS 10*3/mm3 207 227     Results from last 7 days   Lab Units 12/19/22  0836   SODIUM mmol/L 137   POTASSIUM mmol/L 4.5   CHLORIDE mmol/L 103   CO2 mmol/L 23.0   BUN mg/dL 10   CREATININE mg/dL 0.72*   GLUCOSE mg/dL 124*      Results from last 7 days   Lab Units 12/19/22  0836   HEMOGLOBIN A1C % 6.10*             Results from last 7 days   Lab Units 12/19/22  0836   PROTIME Seconds 11.9   INR  0.89   APTT seconds 31.5                   Intake/Output Summary (Last 24 hours) at 12/23/2022 1156  Last data filed at 12/23/2022 1046  Gross per 24 hour   Intake 771 ml   Output 1335 ml   Net -564 ml       Chest X-Ray:  Imaging Results (Last 24 Hours)     Procedure Component Value Units Date/Time    XR Chest 1 View [[id number]] Collected: 12/23/22 1131     Updated: 12/23/22 1136    Narrative:      DATE OF EXAM:  12/23/2022 10:49 AM     PROCEDURE:  XR CHEST 1 VW-      INDICATIONS:  Coronary artery disease, status post CABG procedure, postoperative  follow-up.      COMPARISON:  12/19/2022.     TECHNIQUE:   Single radiographic view of the chest was obtained.     FINDINGS:  The heart is enlarged. The patient's had an interval CABG procedure.  There are 2 mediastinal chest tubes as well as a left-sided chest tube  in place. There is no pneumothorax. There is a right internal jugular  line in place with the tip terminating in the superior vena cava. The  endotracheal tube tip is in good position terminating at the level of  the aortic knob. The nasogastric tube tip terminates in the stomach.   There are perihilar and basilar densities which have the appearance of  subsegmental atelectasis. There are no significant pleural effusions.       Impression:         1. Cardiomegaly with postsurgical changes. There are lines and support  tubes in place as described.  2. Perihilar and basilar densities which have the appearance of  subsegmental atelectasis.     This report was finalized on 12/23/2022 11:33 AM by Rian Jones MD.             Telemetry: NSR  EKG: NSR with 1st degree AVB 69 bpm        Assessment and Plan:     1. HTN:  - currently on cardene drip with stable BPs- wean as tolerated  - home medications include Atenolol, Norvasc, Clonidine, Entresto, Imdur  - will slowly add back as tolerated     2. CAD:  - s/p CABG x 2 (LIMA to LAD, SVG to PDA) today with Dr. Walker  - acceptable post op course  - wean sedation/extubate per CTS/intensivists   - pre op EF normal per echo 12/2022   - plan to start Metoprolol    3. NSVT:  - noted on monitor from outpatient setting 6/2021  - no current ectopy, NSR    4. HLP:  - statin    5. DM2  - per intentivists    6. Tobacco abuse:  - needs immediate cessation        Electronically signed by TOSHIA Dillard, 12/23/22, 11:11 AM EST.    I personally performed the services described in this documentation as scribed by the above named individual in  my presence, and it is both accurate and complete.    MONICA Mcneal MD, MS  12/27/22  08:36 EST

## 2022-12-24 ENCOUNTER — APPOINTMENT (OUTPATIENT)
Dept: GENERAL RADIOLOGY | Facility: HOSPITAL | Age: 47
DRG: 235 | End: 2022-12-24
Payer: COMMERCIAL

## 2022-12-24 LAB
ALBUMIN SERPL-MCNC: 3.9 G/DL (ref 3.5–5.2)
ANION GAP SERPL CALCULATED.3IONS-SCNC: 9 MMOL/L (ref 5–15)
BASOPHILS # BLD AUTO: 0.02 10*3/MM3 (ref 0–0.2)
BASOPHILS NFR BLD AUTO: 0.2 % (ref 0–1.5)
BH BB BLOOD EXPIRATION DATE: NORMAL
BH BB BLOOD EXPIRATION DATE: NORMAL
BH BB BLOOD TYPE BARCODE: 5100
BH BB BLOOD TYPE BARCODE: 6200
BH BB DISPENSE STATUS: NORMAL
BH BB DISPENSE STATUS: NORMAL
BH BB PRODUCT CODE: NORMAL
BH BB PRODUCT CODE: NORMAL
BH BB UNIT NUMBER: NORMAL
BH BB UNIT NUMBER: NORMAL
BUN SERPL-MCNC: 12 MG/DL (ref 6–20)
BUN/CREAT SERPL: 14.3 (ref 7–25)
CALCIUM SPEC-SCNC: 8.3 MG/DL (ref 8.6–10.5)
CHLORIDE SERPL-SCNC: 102 MMOL/L (ref 98–107)
CO2 SERPL-SCNC: 22 MMOL/L (ref 22–29)
CREAT SERPL-MCNC: 0.84 MG/DL (ref 0.76–1.27)
DEPRECATED RDW RBC AUTO: 41.7 FL (ref 37–54)
EGFRCR SERPLBLD CKD-EPI 2021: 108.9 ML/MIN/1.73
EOSINOPHIL # BLD AUTO: 0.07 10*3/MM3 (ref 0–0.4)
EOSINOPHIL NFR BLD AUTO: 0.6 % (ref 0.3–6.2)
ERYTHROCYTE [DISTWIDTH] IN BLOOD BY AUTOMATED COUNT: 12.9 % (ref 12.3–15.4)
GLUCOSE BLDC GLUCOMTR-MCNC: 126 MG/DL (ref 70–130)
GLUCOSE BLDC GLUCOMTR-MCNC: 138 MG/DL (ref 70–130)
GLUCOSE BLDC GLUCOMTR-MCNC: 142 MG/DL (ref 70–130)
GLUCOSE BLDC GLUCOMTR-MCNC: 142 MG/DL (ref 70–130)
GLUCOSE BLDC GLUCOMTR-MCNC: 148 MG/DL (ref 70–130)
GLUCOSE BLDC GLUCOMTR-MCNC: 160 MG/DL (ref 70–130)
GLUCOSE BLDC GLUCOMTR-MCNC: 190 MG/DL (ref 70–130)
GLUCOSE BLDC GLUCOMTR-MCNC: 242 MG/DL (ref 70–130)
GLUCOSE BLDC GLUCOMTR-MCNC: 258 MG/DL (ref 70–130)
GLUCOSE SERPL-MCNC: 156 MG/DL (ref 65–99)
HCT VFR BLD AUTO: 34.7 % (ref 37.5–51)
HGB BLD-MCNC: 11.9 G/DL (ref 13–17.7)
IMM GRANULOCYTES # BLD AUTO: 0.07 10*3/MM3 (ref 0–0.05)
IMM GRANULOCYTES NFR BLD AUTO: 0.6 % (ref 0–0.5)
INR PPP: 1.08 (ref 0.84–1.13)
LYMPHOCYTES # BLD AUTO: 1.29 10*3/MM3 (ref 0.7–3.1)
LYMPHOCYTES NFR BLD AUTO: 10.6 % (ref 19.6–45.3)
MAGNESIUM SERPL-MCNC: 2.4 MG/DL (ref 1.6–2.6)
MCH RBC QN AUTO: 30.2 PG (ref 26.6–33)
MCHC RBC AUTO-ENTMCNC: 34.3 G/DL (ref 31.5–35.7)
MCV RBC AUTO: 88.1 FL (ref 79–97)
MONOCYTES # BLD AUTO: 0.95 10*3/MM3 (ref 0.1–0.9)
MONOCYTES NFR BLD AUTO: 7.8 % (ref 5–12)
NEUTROPHILS NFR BLD AUTO: 80.2 % (ref 42.7–76)
NEUTROPHILS NFR BLD AUTO: 9.82 10*3/MM3 (ref 1.7–7)
NRBC BLD AUTO-RTO: 0 /100 WBC (ref 0–0.2)
PHOSPHATE SERPL-MCNC: 3.4 MG/DL (ref 2.5–4.5)
PLATELET # BLD AUTO: 181 10*3/MM3 (ref 140–450)
PMV BLD AUTO: 10.5 FL (ref 6–12)
POTASSIUM SERPL-SCNC: 4.2 MMOL/L (ref 3.5–5.2)
PROTHROMBIN TIME: 14 SECONDS (ref 11.4–14.4)
QT INTERVAL: 444 MS
QTC INTERVAL: 469 MS
RBC # BLD AUTO: 3.94 10*6/MM3 (ref 4.14–5.8)
SODIUM SERPL-SCNC: 133 MMOL/L (ref 136–145)
UNIT  ABO: NORMAL
UNIT  ABO: NORMAL
UNIT  RH: NORMAL
UNIT  RH: NORMAL
WBC NRBC COR # BLD: 12.22 10*3/MM3 (ref 3.4–10.8)

## 2022-12-24 PROCEDURE — 80069 RENAL FUNCTION PANEL: CPT | Performed by: STUDENT IN AN ORGANIZED HEALTH CARE EDUCATION/TRAINING PROGRAM

## 2022-12-24 PROCEDURE — 97530 THERAPEUTIC ACTIVITIES: CPT

## 2022-12-24 PROCEDURE — 99232 SBSQ HOSP IP/OBS MODERATE 35: CPT | Performed by: INTERNAL MEDICINE

## 2022-12-24 PROCEDURE — 97161 PT EVAL LOW COMPLEX 20 MIN: CPT

## 2022-12-24 PROCEDURE — 25010000002 MORPHINE PER 10 MG: Performed by: THORACIC SURGERY (CARDIOTHORACIC VASCULAR SURGERY)

## 2022-12-24 PROCEDURE — 83735 ASSAY OF MAGNESIUM: CPT | Performed by: STUDENT IN AN ORGANIZED HEALTH CARE EDUCATION/TRAINING PROGRAM

## 2022-12-24 PROCEDURE — 25010000002 CEFAZOLIN IN DEXTROSE 2-4 GM/100ML-% SOLUTION: Performed by: STUDENT IN AN ORGANIZED HEALTH CARE EDUCATION/TRAINING PROGRAM

## 2022-12-24 PROCEDURE — 71045 X-RAY EXAM CHEST 1 VIEW: CPT

## 2022-12-24 PROCEDURE — 63710000001 INSULIN LISPRO (HUMAN) PER 5 UNITS: Performed by: INTERNAL MEDICINE

## 2022-12-24 PROCEDURE — 94799 UNLISTED PULMONARY SVC/PX: CPT

## 2022-12-24 PROCEDURE — 82962 GLUCOSE BLOOD TEST: CPT

## 2022-12-24 PROCEDURE — 93005 ELECTROCARDIOGRAM TRACING: CPT | Performed by: STUDENT IN AN ORGANIZED HEALTH CARE EDUCATION/TRAINING PROGRAM

## 2022-12-24 PROCEDURE — 93010 ELECTROCARDIOGRAM REPORT: CPT | Performed by: INTERNAL MEDICINE

## 2022-12-24 PROCEDURE — 85025 COMPLETE CBC W/AUTO DIFF WBC: CPT | Performed by: STUDENT IN AN ORGANIZED HEALTH CARE EDUCATION/TRAINING PROGRAM

## 2022-12-24 PROCEDURE — 99233 SBSQ HOSP IP/OBS HIGH 50: CPT | Performed by: INTERNAL MEDICINE

## 2022-12-24 PROCEDURE — P9041 ALBUMIN (HUMAN),5%, 50ML: HCPCS | Performed by: STUDENT IN AN ORGANIZED HEALTH CARE EDUCATION/TRAINING PROGRAM

## 2022-12-24 PROCEDURE — 85610 PROTHROMBIN TIME: CPT | Performed by: STUDENT IN AN ORGANIZED HEALTH CARE EDUCATION/TRAINING PROGRAM

## 2022-12-24 PROCEDURE — 25010000002 ALBUMIN HUMAN 5% PER 50 ML: Performed by: STUDENT IN AN ORGANIZED HEALTH CARE EDUCATION/TRAINING PROGRAM

## 2022-12-24 PROCEDURE — 25010000002 KETOROLAC TROMETHAMINE PER 15 MG: Performed by: THORACIC SURGERY (CARDIOTHORACIC VASCULAR SURGERY)

## 2022-12-24 PROCEDURE — 94664 DEMO&/EVAL PT USE INHALER: CPT

## 2022-12-24 PROCEDURE — 94640 AIRWAY INHALATION TREATMENT: CPT

## 2022-12-24 PROCEDURE — 25010000002 AMIODARONE IN DEXTROSE 5% 360-4.14 MG/200ML-% SOLUTION: Performed by: THORACIC SURGERY (CARDIOTHORACIC VASCULAR SURGERY)

## 2022-12-24 RX ORDER — KETOROLAC TROMETHAMINE 30 MG/ML
30 INJECTION, SOLUTION INTRAMUSCULAR; INTRAVENOUS ONCE
Status: COMPLETED | OUTPATIENT
Start: 2022-12-24 | End: 2022-12-24

## 2022-12-24 RX ORDER — NICOTINE POLACRILEX 4 MG
15 LOZENGE BUCCAL
Status: DISCONTINUED | OUTPATIENT
Start: 2022-12-24 | End: 2022-12-30 | Stop reason: HOSPADM

## 2022-12-24 RX ORDER — MORPHINE SULFATE 2 MG/ML
2 INJECTION, SOLUTION INTRAMUSCULAR; INTRAVENOUS
Status: DISCONTINUED | OUTPATIENT
Start: 2022-12-24 | End: 2022-12-30 | Stop reason: HOSPADM

## 2022-12-24 RX ORDER — INSULIN LISPRO 100 [IU]/ML
0-7 INJECTION, SOLUTION INTRAVENOUS; SUBCUTANEOUS
Status: DISCONTINUED | OUTPATIENT
Start: 2022-12-24 | End: 2022-12-30 | Stop reason: HOSPADM

## 2022-12-24 RX ORDER — DEXTROSE MONOHYDRATE 25 G/50ML
25 INJECTION, SOLUTION INTRAVENOUS
Status: DISCONTINUED | OUTPATIENT
Start: 2022-12-24 | End: 2022-12-30 | Stop reason: HOSPADM

## 2022-12-24 RX ORDER — AMLODIPINE BESYLATE 5 MG/1
5 TABLET ORAL
Status: DISCONTINUED | OUTPATIENT
Start: 2022-12-24 | End: 2022-12-25

## 2022-12-24 RX ORDER — CLOPIDOGREL BISULFATE 75 MG/1
75 TABLET ORAL DAILY
Status: DISCONTINUED | OUTPATIENT
Start: 2022-12-24 | End: 2022-12-30 | Stop reason: HOSPADM

## 2022-12-24 RX ORDER — KETOROLAC TROMETHAMINE 15 MG/ML
15 INJECTION, SOLUTION INTRAMUSCULAR; INTRAVENOUS ONCE
Status: COMPLETED | OUTPATIENT
Start: 2022-12-24 | End: 2022-12-24

## 2022-12-24 RX ORDER — METOPROLOL TARTRATE 50 MG/1
50 TABLET, FILM COATED ORAL EVERY 12 HOURS SCHEDULED
Status: DISCONTINUED | OUTPATIENT
Start: 2022-12-24 | End: 2022-12-30 | Stop reason: HOSPADM

## 2022-12-24 RX ADMIN — GABAPENTIN 100 MG: 100 CAPSULE ORAL at 20:02

## 2022-12-24 RX ADMIN — AMIODARONE HYDROCHLORIDE 0.5 MG/MIN: 1.8 INJECTION, SOLUTION INTRAVENOUS at 02:04

## 2022-12-24 RX ADMIN — NITROGLYCERIN 20 MCG/MIN: 20 INJECTION INTRAVENOUS at 07:44

## 2022-12-24 RX ADMIN — GABAPENTIN 100 MG: 100 CAPSULE ORAL at 06:12

## 2022-12-24 RX ADMIN — ACETAMINOPHEN 650 MG: 325 TABLET ORAL at 14:29

## 2022-12-24 RX ADMIN — ALBUTEROL SULFATE 2.5 MG: 2.5 SOLUTION RESPIRATORY (INHALATION) at 06:58

## 2022-12-24 RX ADMIN — ACETAMINOPHEN 650 MG: 325 TABLET ORAL at 06:13

## 2022-12-24 RX ADMIN — ROSUVASTATIN 20 MG: 20 TABLET, FILM COATED ORAL at 19:58

## 2022-12-24 RX ADMIN — METOPROLOL TARTRATE 50 MG: 50 TABLET, FILM COATED ORAL at 20:01

## 2022-12-24 RX ADMIN — GABAPENTIN 100 MG: 100 CAPSULE ORAL at 14:29

## 2022-12-24 RX ADMIN — ALBUMIN (HUMAN) 250 ML: 12.5 INJECTION, SOLUTION INTRAVENOUS at 00:04

## 2022-12-24 RX ADMIN — INSULIN LISPRO 2 UNITS: 100 INJECTION, SOLUTION INTRAVENOUS; SUBCUTANEOUS at 21:06

## 2022-12-24 RX ADMIN — OXYCODONE 10 MG: 5 TABLET ORAL at 08:55

## 2022-12-24 RX ADMIN — CEFAZOLIN SODIUM 2 G: 2 INJECTION, SOLUTION INTRAVENOUS at 17:03

## 2022-12-24 RX ADMIN — CLOPIDOGREL BISULFATE 75 MG: 75 TABLET ORAL at 17:01

## 2022-12-24 RX ADMIN — MORPHINE SULFATE 2 MG: 2 INJECTION, SOLUTION INTRAMUSCULAR; INTRAVENOUS at 19:54

## 2022-12-24 RX ADMIN — SENNOSIDES AND DOCUSATE SODIUM 2 TABLET: 50; 8.6 TABLET ORAL at 20:00

## 2022-12-24 RX ADMIN — CEFAZOLIN SODIUM 2 G: 2 INJECTION, SOLUTION INTRAVENOUS at 02:04

## 2022-12-24 RX ADMIN — METOPROLOL TARTRATE 50 MG: 50 TABLET, FILM COATED ORAL at 08:03

## 2022-12-24 RX ADMIN — AMLODIPINE BESYLATE 5 MG: 5 TABLET ORAL at 08:03

## 2022-12-24 RX ADMIN — ASPIRIN 325 MG: 325 TABLET, COATED ORAL at 08:00

## 2022-12-24 RX ADMIN — NITROGLYCERIN 100 MCG/MIN: 20 INJECTION INTRAVENOUS at 17:02

## 2022-12-24 RX ADMIN — HYDROCODONE BITARTRATE AND ACETAMINOPHEN 1 TABLET: 7.5; 325 TABLET ORAL at 03:33

## 2022-12-24 RX ADMIN — OXYCODONE 10 MG: 5 TABLET ORAL at 20:07

## 2022-12-24 RX ADMIN — SENNOSIDES AND DOCUSATE SODIUM 2 TABLET: 50; 8.6 TABLET ORAL at 08:00

## 2022-12-24 RX ADMIN — KETOROLAC TROMETHAMINE 30 MG: 30 INJECTION, SOLUTION INTRAMUSCULAR; INTRAVENOUS at 07:49

## 2022-12-24 RX ADMIN — ACETAMINOPHEN 650 MG: 325 TABLET ORAL at 20:00

## 2022-12-24 RX ADMIN — KETOROLAC TROMETHAMINE 15 MG: 15 INJECTION, SOLUTION INTRAMUSCULAR; INTRAVENOUS at 14:29

## 2022-12-24 RX ADMIN — CEFAZOLIN SODIUM 2 G: 2 INJECTION, SOLUTION INTRAVENOUS at 09:52

## 2022-12-24 RX ADMIN — MORPHINE SULFATE 2 MG: 2 INJECTION, SOLUTION INTRAMUSCULAR; INTRAVENOUS at 05:59

## 2022-12-24 NOTE — PLAN OF CARE
Goal Outcome Evaluation:  Plan of Care Reviewed With: patient        Progress: improving  Outcome Evaluation: VSS at this time.  Complaints of pain treated with PRN analgesics with relief.  Remains A&Ox4, moves all extremities equally.  Stood and pivoted to the chair this morning with minimal assist.  Remains on 4LNC, tachynpeic throughout shift taking shallow breaths.  Coughing, deep breathing, and pulmonary toileting frequently encouraged throughout shift.  MT's 1&2 with 110 mL output, 3&4 with 240 mL output.  NSR on cardiac monitor, Levo gtt weaned off.  Currently on no vasoactive gtts.  495 mL UOP thi shift.  Progressing with satisfactor postop course thus far, currently up in chair with no complaints.

## 2022-12-24 NOTE — PLAN OF CARE
Goal Outcome Evaluation:  Plan of Care Reviewed With: patient        Progress: no change  Outcome Evaluation: PT evaluation completed. Pt s/p CABGx2, presents with mobility below baseline, will benefit from IP PT services. Pt ambulated 220' CGAx1+1, on nonrebreather for gait. Vitals WFL throughout. Recommend home upon DC.

## 2022-12-24 NOTE — PROGRESS NOTES
CTS Progress Note       LOS: 1 day   Patient Care Team:  Amita Crook APRN as PCP - General (Nurse Practitioner)    Chief Complaint: 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 pectoris (HCC)    Vital Signs:  Temp:  [97.5 °F (36.4 °C)-100.2 °F (37.9 °C)] 97.9 °F (36.6 °C)  Heart Rate:  [55-89] 73  Resp:  [16-40] 38  BP: ()/(47-84) 115/62  Arterial Line BP: ()/() 119/60  FiO2 (%):  [40 %-100 %] 40 %    Physical Exam: Extubated up in chair breathing unlabored no neurologic deficits       Results:   Results from last 7 days   Lab Units 12/24/22  0329   WBC 10*3/mm3 12.22*   HEMOGLOBIN g/dL 11.9*   HEMATOCRIT % 34.7*   PLATELETS 10*3/mm3 181     Results from last 7 days   Lab Units 12/24/22  0329   SODIUM mmol/L 133*   POTASSIUM mmol/L 4.2   CHLORIDE mmol/L 102   CO2 mmol/L 22.0   BUN mg/dL 12   CREATININE mg/dL 0.84   GLUCOSE mg/dL 156*   CALCIUM mg/dL 8.3*           Imaging Results (Last 24 Hours)     Procedure Component Value Units Date/Time    XR Chest 1 View [[id number]] Collected: 12/24/22 0610     Updated: 12/24/22 0616    Narrative:      DATE OF EXAM: 12/24/2022 4:26 AM     PROCEDURE: XR CHEST 1 VW-     INDICATIONS: Post-Op Heart Surgery; I25.119-Atherosclerotic heart  disease of native coronary artery with unspecified angina pectoris.      COMPARISON: Chest x-ray 12/23/2022     TECHNIQUE: Single frontal view of the chest.     FINDINGS:  Redemonstration of postsurgical changes of CABG. Interval extubation and  removal of NG/OG tube. Midline and bilateral thoracostomy tubes are  unchanged. Redemonstration of right IJ introducer sheath and CVC  terminating in the mid SVC. Mild decreased aeration of the lungs status  post extubation with worsening retrocardiac and left base consolidation  compatible with atelectasis. There may be a small left pleural effusion.  There is a trace right apical pneumothorax.       Impression:      Extubation with decreased  lung volumes and worsened left  base/retrocardiac opacities, likely atelectasis. Thoracostomy tubes  remain in place. There is a trace right apical pneumothorax.     This report was finalized on 12/24/2022 6:13 AM by Jose Sanchez MD.       XR Chest 1 View [[id number]] Collected: 12/23/22 1131     Updated: 12/23/22 1136    Narrative:      DATE OF EXAM:  12/23/2022 10:49 AM     PROCEDURE:  XR CHEST 1 VW-     INDICATIONS:  Coronary artery disease, status post CABG procedure, postoperative  follow-up.      COMPARISON:  12/19/2022.     TECHNIQUE:   Single radiographic view of the chest was obtained.     FINDINGS:  The heart is enlarged. The patient's had an interval CABG procedure.  There are 2 mediastinal chest tubes as well as a left-sided chest tube  in place. There is no pneumothorax. There is a right internal jugular  line in place with the tip terminating in the superior vena cava. The  endotracheal tube tip is in good position terminating at the level of  the aortic knob. The nasogastric tube tip terminates in the stomach.   There are perihilar and basilar densities which have the appearance of  subsegmental atelectasis. There are no significant pleural effusions.       Impression:         1. Cardiomegaly with postsurgical changes. There are lines and support  tubes in place as described.  2. Perihilar and basilar densities which have the appearance of  subsegmental atelectasis.     This report was finalized on 12/23/2022 11:33 AM by Rian Jones MD.             Assessment      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 pectoris (HCC)    Essential hypertension    DM (diabetes mellitus), type 2 (HCC)    NSVT (nonsustained ventricular tachycardia)    Coronary artery disease of native artery of native heart with stable angina pectoris (HCC)    Satisfactory progress.  This patient needs to resume his Plavix.    Plan   Plavix 75 mg p.o. at 5 PM tonight and then daily  thereafter    Please note that portions of this note were completed with a voice recognition program. Efforts were made to edit the dictations, but occasionally words are mistranscribed.    Norris Walker MD  12/24/22  06:39 EST

## 2022-12-24 NOTE — PLAN OF CARE
Goal Outcome Evaluation:  Plan of Care Reviewed With: patient           Outcome Evaluation: Increased O2 demand at start of shift, HFNC initiated. NTG started for SBP greater than 150. Some home BP meds restarted, will re-eval the others tomorrow per Antelmo. NTG continues at 75 mcg/min at this time.  HFNC currentlyt at 70% and 60L. Ambulated x2 in echavarria, 220 feet. Up in chair all day. IS to 500. Pulm toilet encouraged. CXR to see if any changes from this AM, no changes noted. A line dc'd. UOP marginal despite more than adequate PO intake. MD aware. MT drainage ok. Insulin gtt transitioned to subq. Plavix to start this evening. Toradol given x2 doses, pericardial rub improved. Pain tolerable. Cont plan of care

## 2022-12-24 NOTE — THERAPY EVALUATION
Patient Name: Stone Villeda  : 1975    MRN: [id number]                              Today's Date: 2022       Admit Date: 2022    Visit Dx:     ICD-10-CM ICD-9-CM   1. 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 pectoris (HCC)  I25.119 414.01     413.9     Patient Active Problem List   Diagnosis   • Other chest pain   • Essential hypertension   • Hyperlipidemia   • Smoking   • Family history of early CAD   • Shortness of breath   • Palpitations   • DM (diabetes mellitus), type 2 (HCC)   • PVC (premature ventricular contraction)   • NSVT (nonsustained ventricular tachycardia)   • Grade I diastolic dysfunction   • Coronary artery disease of native artery of native heart with stable angina pectoris (HCC)   • Dizziness   • Abnormal stress test   • Peripheral edema   • Ischemic cardiomyopathy   • 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 pectoris (HCC)     Past Medical History:   Diagnosis Date   • Coronary artery disease    • COVID-19 02/15/2022   • Diabetes mellitus (HCC)    • Hyperlipidemia    • Hypertension    • Sleep apnea     NO CPAP     Past Surgical History:   Procedure Laterality Date   • CARDIAC CATHETERIZATION      X2; NO INTERVENTION   • CHOLECYSTECTOMY     • HAND SURGERY Right    • WISDOM TOOTH EXTRACTION        General Information     Row Name 22 1138          Physical Therapy Time and Intention    Document Type evaluation  -AGGIE     Mode of Treatment physical therapy  -AGGIE     Row Name 22 1138          General Information    Patient Profile Reviewed yes  -AGGIE     Prior Level of Function independent:;all household mobility;community mobility;driving;ADL's  -AGGIE     Existing Precautions/Restrictions cardiac;oxygen therapy device and L/min;sternal  -AGGIE     Barriers to Rehab none identified  -AGGIE     Row Name 22 1138          Living Environment    People in Home child(jose luis), adult  -AGGIE     Row Name  12/24/22 1138          Home Main Entrance    Number of Stairs, Main Entrance two  -AGGIE     Row Name 12/24/22 1138          Stairs Within Home, Primary    Number of Stairs, Within Home, Primary none  -AGGIE     Row Name 12/24/22 1138          Cognition    Orientation Status (Cognition) oriented x 4  -AGGIE     Row Name 12/24/22 1138          Safety Issues, Functional Mobility    Safety Issues Affecting Function (Mobility) awareness of need for assistance  -AGGIE     Impairments Affecting Function (Mobility) endurance/activity tolerance  -AGGIE           User Key  (r) = Recorded By, (t) = Taken By, (c) = Cosigned By    Initials Name Provider Type    Danita Montana PT Physical Therapist               Mobility     Row Name 12/24/22 1139          Bed Mobility    Comment, (Bed Mobility) Deferred UIC  -AGGIE     Row Name 12/24/22 1139          Transfers    Comment, (Transfers) Cues to maintain sternal precautions.  -AGGIE     Row Name 12/24/22 1139          Sit-Stand Transfer    Sit-Stand [place] (Transfers) contact guard;verbal cues;1 person assist;1 person to manage equipment  -AGGIE     Comment, (Sit-Stand Transfer) Cues for hand placment.  -AGGIE     Row Name 12/24/22 1139          Gait/Stairs (Locomotion)    [place] Level (Gait) contact guard;1 person assist;1 person to manage equipment  -AGGIE     Distance in Feet (Gait) 220  -AGGIE     Bilateral Gait Deviations forward flexed posture  -AGGIE     Comment, (Gait/Stairs) Pt demo slightly forward flexed posture but steady step through gait pattern with appropriate speed. Pt held onto TM, however, can likely progress to no UE support.  -AGGIE           User Key  (r) = Recorded By, (t) = Taken By, (c) = Cosigned By    Initials Name Provider Type    Danita Montana PT Physical Therapist               Obj/Interventions     Row Name 12/24/22 1141          Range of Motion Comprehensive    General Range of Motion bilateral lower extremity ROM WFL  -AGGIE     Row Name 12/24/22 1141           Strength Comprehensive (MMT)    General Manual Muscle Testing (MMT) Assessment no strength deficits identified  -AGGIE     Row Name 12/24/22 1141          Balance    Balance Assessment sitting static balance;sit to stand dynamic balance;standing static balance  -AGGIE     Static Sitting Balance independent  -AGGIE     Position, Sitting Balance unsupported;sitting in chair  -AGGIE     Sit to Stand Dynamic Balance contact guard  -AGGIE     Static Standing Balance contact guard  -AGGIE     Position/Device Used, Standing Balance other (see comments)  UE support  -AGGIE     Balance Interventions sitting;standing;sit to stand  -AGGIE     Comment, Balance No LOB throughout.  -AGGIE     Row Name 12/24/22 1141          Sensory Assessment (Somatosensory)    Sensory Assessment (Somatosensory) LE sensation intact  -AGGIE           User Key  (r) = Recorded By, (t) = Taken By, (c) = Cosigned By    Initials Name Provider Type    Danita Montana, PT Physical Therapist               Goals/Plan     Row Name 12/24/22 114          Bed Mobility Goal 1 (PT)    Activity/Assistive Device (Bed Mobility Goal 1, PT) sit to supine/supine to sit  -AGGIE     [place] Level/Cues Needed (Bed Mobility Goal 1, PT) modified independence  -AGGIE     Time Frame (Bed Mobility Goal 1, PT) 2 weeks  -AGGIE     Row Name 12/24/22 1144          Transfer Goal 1 (PT)    Activity/Assistive Device (Transfer Goal 1, PT) sit-to-stand/stand-to-sit  -AGGIE     [place] Level/Cues Needed (Transfer Goal 1, PT) independent  -AGGIE     Time Frame (Transfer Goal 1, PT) 2 weeks  -AGGIE     Row Name 12/24/22 1144          Gait Training Goal 1 (PT)    Activity/Assistive Device (Gait Training Goal 1, PT) gait (walking locomotion)  -AGGIE     [place] Level (Gait Training Goal 1, PT) independent  -AGGIE     Distance (Gait Training Goal 1, PT) 400  -AGGIE     Time Frame (Gait Training Goal 1, PT) 2 weeks  -AGGIE     Row Name 12/24/22 1146          Stairs Goal 1 (PT)    Activity/Assistive Device (Stairs Goal 1, PT)  stairs, all skills  -AGGIE     [place] Level/Cues Needed (Stairs Goal 1, PT) independent  -AGGIE     Number of Stairs (Stairs Goal 1, PT) 2  -AGGIE     Time Frame (Stairs Goal 1, PT) 2 weeks  -AGGIE     Row Name 12/24/22 1145          Therapy Assessment/Plan (PT)    Planned Therapy Interventions (PT) balance training;home exercise program;gait training;bed mobility training;stair training;strengthening;ROM (range of motion);stretching;transfer training;postural re-education;patient/family education  -AGGIE           User Key  (r) = Recorded By, (t) = Taken By, (c) = Cosigned By    Initials Name Provider Type    AGGIE Danita Villanueva, PT Physical Therapist               Clinical Impression     Row Name 12/24/22 1142          Pain    Pretreatment Pain Rating 0/10 - no pain  -AGGIE     Posttreatment Pain Rating 0/10 - no pain  -AGGIE     Row Name 12/24/22 1142          Plan of Care Review    Plan of Care Reviewed With patient  -AGGIE     Progress no change  -AGGIE     Outcome Evaluation PT evaluation completed. Pt s/p CABGx2, presents with mobility below baseline, will benefit from IP PT services. Pt ambulated 220' CGAx1+1, on nonrebreather for gait. Vitals WFL throughout. Recommend home upon DC.  -AGGIE     Row Name 12/24/22 1142          Therapy Assessment/Plan (PT)    Patient/Family Therapy Goals Statement (PT) return home.  -AGGIE     Rehab Potential (PT) good, to achieve stated therapy goals  -AGGIE     Criteria for Skilled Interventions Met (PT) yes;skilled treatment is necessary  -AGGIE     Therapy Frequency (PT) daily  -AGGIE     Row Name 12/24/22 1142          Vital Signs    Pre Systolic BP Rehab 135  -AGGIE     Pre Treatment Diastolic BP 69  -AGGIE     Post Systolic BP Rehab 144  -AGGIE     Post Treatment Diastolic BP 71  -AGGIE     Pretreatment Heart Rate (beats/min) 81  -AGGIE     Posttreatment Heart Rate (beats/min) 90  -AGGIE     Pre SpO2 (%) 92  -AGGIE     O2 Delivery Pre Treatment hi-flow  -AGGIE     Intra SpO2 (%) 93  -AGGIE     O2 Delivery Intra Treatment  non-rebreather  -AGGIE     Post SpO2 (%) 92  -AGGIE     O2 Delivery Post Treatment hi-flow  -AGGIE     Pre Patient Position Sitting  -AGGIE     Intra Patient Position Standing  -AGGIE     Post Patient Position Sitting  -AGGIE     Row Name 12/24/22 1142          Positioning and Restraints    Pre-Treatment Position sitting in chair/recliner  -AGGIE     Post Treatment Position chair  -AGGIE     In Chair notified nsg;reclined;call light within reach;encouraged to call for assist;with nsg;legs elevated  -AGGIE           User Key  (r) = Recorded By, (t) = Taken By, (c) = Cosigned By    Initials Name Provider Type    Danita Montana, PT Physical Therapist               Outcome Measures     Row Name 12/24/22 1146 12/24/22 0800       How much help from another person do you currently need...    Turning from your back to your side while in flat bed without using bedrails? 3  -AGGIE 3  -LK    Moving from lying on back to sitting on the side of a flat bed without bedrails? 3  -AGGIE 3  -LK    Moving to and from a bed to a chair (including a wheelchair)? 3  -AGGIE 3  -LK    Standing up from a chair using your arms (e.g., wheelchair, bedside chair)? 3  -AGGIE 3  -LK    Climbing 3-5 steps with a railing? 3  -AGGIE 3  -LK    To walk in hospital room? 3  -AGGIE 3  -LK    AM-PAC 6 Clicks Score (PT) 18  -AGGIE 18  -LK    Highest level of mobility 6 --> Walked 10 steps or more  -AGGIE 6 --> Walked 10 steps or more  -LK    Row Name 12/24/22 1146          Functional Assessment    Outcome Measure Options AM-PAC 6 Clicks Basic Mobility (PT)  -AGGIE           User Key  (r) = Recorded By, (t) = Taken By, (c) = Cosigned By    Initials Name Provider Type    Meredith Celaya RN Registered Nurse    Danita Montana, PT Physical Therapist                             Physical Therapy Education     Title: PT OT SLP Therapies (In Progress)     Topic: Physical Therapy (In Progress)     Point: Mobility training (Done)     Learning Progress Summary           Patient Acceptance, E, VU,NR by AGGIE at  12/24/2022 1146                   Point: Home exercise program (Not Started)     Learner Progress:  Not documented in this visit.          Point: Body mechanics (Done)     Learning Progress Summary           Patient Acceptance, E, VU,NR by AGGIE at 12/24/2022 1146                   Point: Precautions (Done)     Learning Progress Summary           Patient Acceptance, E, VU,NR by AGGIE at 12/24/2022 1146                               User Key     Initials Effective Dates Name Provider Type Discipline    AGGIE 06/16/21 -  Danita Villanueva, MICHAEL Physical Therapist PT              PT Recommendation and Plan  Planned Therapy Interventions (PT): balance training, home exercise program, gait training, bed mobility training, stair training, strengthening, ROM (range of motion), stretching, transfer training, postural re-education, patient/family education  Plan of Care Reviewed With: patient  Progress: no change  Outcome Evaluation: PT evaluation completed. Pt s/p CABGx2, presents with mobility below baseline, will benefit from IP PT services. Pt ambulated 220' CGAx1+1, on nonrebreather for gait. Vitals WFL throughout. Recommend home upon DC.     Time Calculation:    PT Charges     Row Name 12/24/22 1147             Time Calculation    Start Time 0909  -AGGIE      PT Received On 12/24/22  -AGGIE      PT Goal Re-Cert Due Date 01/03/23  -AGGIE         Time Calculation- PT    Total Timed Code Minutes- PT 13 minute(s)  -AGGIE         Timed Charges    71687 - PT Therapeutic Activity Minutes 13  -AGGIE         Untimed Charges    PT Eval/Re-eval Minutes 55  -AGGIE         Total Minutes    Timed Charges Total Minutes 13  -AGGIE      Untimed Charges Total Minutes 55  -AGGIE       Total Minutes 68  -AGGIE            User Key  (r) = Recorded By, (t) = Taken By, (c) = Cosigned By    Initials Name Provider Type    Danita Montana, MICHAEL Physical Therapist              Therapy Charges for Today     Code Description Service Date Service Provider Modifiers Qty    52440204053  HC PT THERAPEUTIC ACT EA 15 MIN 12/24/2022 Danita Villanueva, PT GP 1    41740593983 HC PT EVAL LOW COMPLEXITY 4 12/24/2022 Danita Villanueva, PT GP 1    [id number] HC PT THER SUPP EA 15 MIN 12/24/2022 Danita Villanueva, PT GP 2          PT G-Codes  Outcome Measure Options: AM-PAC 6 Clicks Basic Mobility (PT)  AM-PAC 6 Clicks Score (PT): 18  PT Discharge Summary  Anticipated Discharge Disposition (PT): home    Danita Villanueva PT  12/24/2022

## 2022-12-24 NOTE — PROGRESS NOTES
Stone Villeda  [id number]  1975   LOS: 1 day   Patient Care Team:  Amita Crook APRN as PCP - General (Nurse Practitioner)    Chief Complaint:  IHD / CABG x 2 / T2 DM    Subjective     Comfortable sitting up in chair on 2 L/min nasal cannula (oximetry 98%).  He states he has less notable tachypnea and dyspnea and incisional pain is under acceptable control.  No current cough, sputum production, pleurisy, or hemoptysis.  No anginal type chest discomfort.  Alert, oriented, and appropriate.    Objective     Vital Sign Min/Max for last 24 hours  Temp  Min: 97.5 °F (36.4 °C)  Max: 100.2 °F (37.9 °C)   BP  Min: 87/50  Max: 124/80   Pulse  Min: 55  Max: 89   Resp  Min: 16  Max: 40   SpO2  Min: 92 %  Max: 100 %               12/23/22  0612   Weight: 117 kg (258 lb)         Intake/Output Summary (Last 24 hours) at 12/24/2022 0735  Last data filed at 12/24/2022 0600  Gross per 24 hour   Intake 4593 ml   Output 3130 ml   Net 1463 ml       Physical Exam:     General Appearance:    Alert, cooperative, in no acute distress   Lungs:     Clear to auscultation,respirations regular, even and                   unlabored    Heart:    Regular and normal rate, normal S1 and S2, grade 1/6 systolic murmur, no gallop, prominent triphasic rub, no click   Abdomen:  Extremities:   Soft, non-tender, bowel sounds audible x4    No edema, normal range of motion   Pulses:   Pulses palpable and equal bilaterally      Results Review:   Results from last 7 days   Lab Units 12/24/22  0329 12/23/22  1518 12/23/22  1122   SODIUM mmol/L 133* 138 136   POTASSIUM mmol/L 4.2 3.9 4.8   CHLORIDE mmol/L 102 106 104   CO2 mmol/L 22.0 23.0 24.0   BUN mg/dL 12 8 7   CREATININE mg/dL 0.84 0.77 0.79   GLUCOSE mg/dL 156* 170* 179*   CALCIUM mg/dL 8.3* 8.1* 9.1     Results from last 7 days   Lab Units 12/24/22  0329 12/23/22  1518 12/23/22  1122   WBC 10*3/mm3 12.22* 13.63* 18.08*   HEMOGLOBIN g/dL 11.9* 12.0* 14.0   HEMATOCRIT % 34.7* 34.8* 41.5    PLATELETS 10*3/mm3 181 182 207     Results from last 7 days   Lab Units 12/19/22  0836   HEMOGLOBIN A1C % 6.10*     · OP NOTE:    Procedure: Coronary bypass grafting x2 with endoscopic harvesting of the right great saphenous vein.  Left internal mammary artery to left descending saphenous vein graft to the posterior descending branch of the right.    · EKG:    Normal sinus rhythm  ST elevation, consider early repolarization, pericarditis, or injury  Nonspecific ST and T wave abnormality  Prolonged QT  Abnormal ECG  When compared with ECG of 23-DEC-2022 10:55,  Questionable change in QRS axis  ST elevation now present in Lateral leads  Nonspecific T wave abnormality, worse in Lateral leads    · CXR:    FINDINGS:    Redemonstration of postsurgical changes of CABG. Interval extubation and  removal of NG/OG tube. Midline and bilateral thoracostomy tubes are  unchanged. Redemonstration of right IJ introducer sheath and CVC  terminating in the mid SVC. Mild decreased aeration of the lungs status  post extubation with worsening retrocardiac and left base consolidation  compatible with atelectasis. There may be a small left pleural effusion.  There is a trace right apical pneumothorax.     IMPRESSION:    Extubation with decreased lung volumes and worsened left  base/retrocardiac opacities, likely atelectasis. Thoracostomy tubes  remain in place. There is a trace right apical pneumothorax.    · ACCU-CHEKS: 156-142-138 mg/DL.    Medication Review: REVIEWED; DRIPS:     · D5W with 20 millequivalents KCl/liter 30 cc/hour continuous infusion  · Regular insulin 0.7 unit/hour continuous infusion    Assessment & Plan     Labile hypertension; otherwise acceptable course.  We will adjust antihypertensive therapy and continue to monitor closely.  No further nonsustained ventricular tachycardia or atrial tachyarrhythmias documented.      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 pectoris  (HCC)    Essential hypertension    DM (diabetes mellitus), type 2 (HCC)    NSVT (nonsustained ventricular tachycardia)    Coronary artery disease of native artery of native heart with stable angina pectoris ([facility])      12/24/22  07:35 EST

## 2022-12-24 NOTE — PLAN OF CARE
Goal Outcome Evaluation:  Plan of Care Reviewed With: patient        Progress: improving  Outcome Evaluation: doing well. weaned off vent without problem. r femeral zheng dced. no bleeding or hematoma. low dose levo. low dose precedex for anxiety. refusing pain meds at this time. mag replaced.

## 2022-12-24 NOTE — PROGRESS NOTES
"INTENSIVIST   PROGRESS NOTE     Hospital:  LOS: 1 day     Chief Complaint: Postoperative management    Subjective   S     Interval History: Earlier this morning, patient with increased blood pressure and placed on nitro drip.  Also with concomitant worsening hypoxia and placed on high flow nasal cannula.  Afebrile.  Otherwise hemodynamically stable.  Patient feels perhaps more short of breath but denies chest pain, syncope, presyncope, nausea, vomiting, diarrhea, hemoptysis.    The patient's relevant past medical, surgical and social history were reviewed and updated in Epic as appropriate.      ROS: Fourteen point review of system performed and negative except for that mentioned in HPI.     Objective   O     Intake/Ouptut 24 hrs (7:00AM - 6:59 AM)  Intake & Output (last 3 days)       12/21 0701 12/22 0700 12/22 0701 12/23 0700 12/23 0701 12/24 0700 12/24 0701 12/25 0700    P.O.   1000     I.V. (mL/kg)   2522 (21.6)     Blood   771     IV Piggyback   300     Total Intake(mL/kg)   4593 (39.3)     Urine (mL/kg/hr)   2520 (0.9) 50 (0.2)    Emesis/NG output   50     Chest Tube   560 0    Total Output   3130 50    Net   +1463 -50                Medications (drips):  dexmedetomidine, Last Rate: 0.4 mcg/kg/hr (12/23/22 1700)  dextrose 5 % with KCl 20 mEq, Last Rate: 30 mL/hr (12/23/22 1143)  insulin, Last Rate: 0.7 Units/hr (12/23/22 2222)  niCARdipine, Last Rate: Stopped (12/23/22 1230)  nitroglycerin, Last Rate: 150 mcg/min (12/24/22 0845)  norepinephrine, Last Rate: Stopped (12/24/22 0000)  phenylephrine      Respiratory Support: On high flow nasal cannula    Physical Examination:  Vital Signs: Blood pressure 153/79, pulse 84, temperature 98.9 °F (37.2 °C), temperature source Bladder, resp. rate (!) 30, height 193 cm (75.98\"), weight 117 kg (258 lb), SpO2 (!) 89 %.    General: The patient appears in no acute distress. Alert, cooperative and interactive.  Neck: Trachea midline, No masses. No JVD.  Chest: Clear to " auscultation bilaterally, No wheezing, rhonchi, or rales. Normal work of breathing. Equal chest rise.  Cardiac: Regular rhythm, normal rate, S1S2 auscultated. No murmurs, rubs or gallops.   Extremities: No lower extremity edema. No clubbing or cyanosis.  Neuro: Motor power grossly intact bilaterally. Sensation intact. Speech fluid and fluent. Thought process coherent.  Psych: Alert and oriented x3. Mood stable.    Lines, Drains & Airways     Active LDAs     Name Placement date Placement time Site Days    CVC Double Lumen 12/23/22 Right Internal jugular 12/23/22  0720  created via procedure documentation  Internal jugular  1    Peripheral IV 12/23/22 0606 Left;Medial Wrist 12/23/22  0606  Wrist  1    Urethral Catheter Temperature probe;Silicone 16 Fr. 12/23/22  --  -- 1    Y Chest Tube 1 and 2 1 Right Mediastinal 32 Fr. 2 Right Mediastinal 32 Fr. 12/23/22  --  -- 1    Y Chest Tube 3 and 4 3 Right Mediastinal 32 Fr. 4 Right Mediastinal 32 Fr. 12/23/22  --  -- 1    Arterial Line 12/23/22 Right Radial 12/23/22  0630  created via procedure documentation  Radial  1              Results from last 7 days   Lab Units 12/24/22  0329 12/23/22  1518 12/23/22  1122   WBC 10*3/mm3 12.22* 13.63* 18.08*   HEMOGLOBIN g/dL 11.9* 12.0* 14.0   MCV fL 88.1 87.4 87.9   PLATELETS 10*3/mm3 181 182 207     Results from last 7 days   Lab Units 12/24/22  0329 12/23/22  1518 12/23/22  1122   SODIUM mmol/L 133* 138 136   POTASSIUM mmol/L 4.2 3.9 4.8   CO2 mmol/L 22.0 23.0 24.0   CREATININE mg/dL 0.84 0.77 0.79   GLUCOSE mg/dL 156* 170* 179*   MAGNESIUM mg/dL 2.4 2.5 1.9   PHOSPHORUS mg/dL 3.4 3.7 4.3     Estimated Creatinine Clearance: 153.7 mL/min (by C-G formula based on SCr of 0.84 mg/dL).  Results from last 7 days   Lab Units 12/19/22  0836   ALK PHOS U/L 112   BILIRUBIN mg/dL 0.2   ALT (SGPT) U/L 16   AST (SGOT) U/L 17       Results from last 7 days   Lab Units 12/23/22  1631 12/23/22  1121   PH, ARTERIAL pH units 7.332* 7.330*   PCO2,  ARTERIAL mm Hg 45.4* 47.7*   PO2 ART mm Hg 68.6* 121.0*   FIO2 % 40 100     CXR (12/24/2022):  Radiology impression:  Redemonstration of postsurgical changes of CABG. Interval extubation and  removal of NG/OG tube. Midline and bilateral thoracostomy tubes are  unchanged. Redemonstration of right IJ introducer sheath and CVC  terminating in the mid SVC. Mild decreased aeration of the lungs status  post extubation with worsening retrocardiac and left base consolidation  compatible with atelectasis. There may be a small left pleural effusion.  There is a trace right apical pneumothorax.     Extubation with decreased lung volumes and worsened left  base/retrocardiac opacities, likely atelectasis. Thoracostomy tubes  remain in place. There is a trace right apical pneumothorax.    Results: Reviewed.  I reviewed the patient's new laboratory and imaging results.  I independently reviewed the patient's new images.    Medications: Reviewed.    Assessment & Plan   A / P     Active Hospital Problems    Diagnosis    • **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 pectoris (HCC)    • Coronary artery disease of native artery of native heart with stable angina pectoris (HCC)    • NSVT (nonsustained ventricular tachycardia)    • DM (diabetes mellitus), type 2 (HCC)    • Essential hypertension      Patient Ronal is a 47M with a history of HTN, HLD, T2DM, VT, ischemic cardiomyopathy, CAD and ongoing tobacco use who underwent CABG x 2 by Dr. Walker (12/23). He is admitted to the ICU for postoperative management.  While he did not have any intra-operative arrhythmias, patient placed on Amiodarone infusion for prophylaxis secondary to his history of VT.     Preoperative spirometry reviewed and negative for airway obstruction.     -Extubated and now on high flow nasal cannula; titrate as tolerated  -Continue Amiodarone infusion for 24 hours.   -Insulin drip for diabetes management; Will  transition (12/24)  -Chest tube management per CTS  -Pain control/supportive care  -Needs smoking cessation  -Plan to reinitiate Plavix (12/24); then daily  -AM labs and CXR; Will repeat CXR now due to increased in O2 requirement/back on HFNC     Advance Directives:   There are no questions and answers to display.     High level of risk due to:  severe exacerbation of chronic illness and illness with threat to life or bodily function.    Plan of care and goals reviewed during interdisciplinary rounds.  I discussed the patient's findings and my recommendations with patient    -- Ko Garcia MD  Pulmonary/Critical Care

## 2022-12-25 ENCOUNTER — APPOINTMENT (OUTPATIENT)
Dept: GENERAL RADIOLOGY | Facility: HOSPITAL | Age: 47
DRG: 235 | End: 2022-12-25
Payer: COMMERCIAL

## 2022-12-25 ENCOUNTER — APPOINTMENT (OUTPATIENT)
Dept: CT IMAGING | Facility: HOSPITAL | Age: 47
DRG: 235 | End: 2022-12-25
Payer: COMMERCIAL

## 2022-12-25 LAB
ANION GAP SERPL CALCULATED.3IONS-SCNC: 10 MMOL/L (ref 5–15)
ANION GAP SERPL CALCULATED.3IONS-SCNC: 8 MMOL/L (ref 5–15)
ANION GAP SERPL CALCULATED.3IONS-SCNC: 9 MMOL/L (ref 5–15)
ARTERIAL PATENCY WRIST A: ABNORMAL
ATMOSPHERIC PRESS: ABNORMAL MM[HG]
BASE EXCESS BLDA CALC-SCNC: 2.7 MMOL/L (ref 0–2)
BDY SITE: ABNORMAL
BODY TEMPERATURE: 37 C
BUN SERPL-MCNC: 12 MG/DL (ref 6–20)
BUN SERPL-MCNC: 12 MG/DL (ref 6–20)
BUN SERPL-MCNC: 13 MG/DL (ref 6–20)
BUN/CREAT SERPL: 17.6 (ref 7–25)
BUN/CREAT SERPL: 17.9 (ref 7–25)
BUN/CREAT SERPL: 20.3 (ref 7–25)
CALCIUM SPEC-SCNC: 8.1 MG/DL (ref 8.6–10.5)
CALCIUM SPEC-SCNC: 8.4 MG/DL (ref 8.6–10.5)
CALCIUM SPEC-SCNC: 8.6 MG/DL (ref 8.6–10.5)
CHLORIDE SERPL-SCNC: 89 MMOL/L (ref 98–107)
CHLORIDE SERPL-SCNC: 89 MMOL/L (ref 98–107)
CHLORIDE SERPL-SCNC: 90 MMOL/L (ref 98–107)
CO2 BLDA-SCNC: 26.6 MMOL/L (ref 22–33)
CO2 SERPL-SCNC: 24 MMOL/L (ref 22–29)
CO2 SERPL-SCNC: 25 MMOL/L (ref 22–29)
CO2 SERPL-SCNC: 26 MMOL/L (ref 22–29)
COHGB MFR BLD: 0.7 % (ref 0–2)
CREAT SERPL-MCNC: 0.64 MG/DL (ref 0.76–1.27)
CREAT SERPL-MCNC: 0.67 MG/DL (ref 0.76–1.27)
CREAT SERPL-MCNC: 0.68 MG/DL (ref 0.76–1.27)
CREAT UR-MCNC: 30 MG/DL
DEPRECATED RDW RBC AUTO: 38.6 FL (ref 37–54)
EGFRCR SERPLBLD CKD-EPI 2021: 116.1 ML/MIN/1.73
EGFRCR SERPLBLD CKD-EPI 2021: 116.6 ML/MIN/1.73
EGFRCR SERPLBLD CKD-EPI 2021: 118.2 ML/MIN/1.73
EPAP: 12
ERYTHROCYTE [DISTWIDTH] IN BLOOD BY AUTOMATED COUNT: 12.4 % (ref 12.3–15.4)
GLUCOSE BLDC GLUCOMTR-MCNC: 115 MG/DL (ref 70–130)
GLUCOSE BLDC GLUCOMTR-MCNC: 125 MG/DL (ref 70–130)
GLUCOSE BLDC GLUCOMTR-MCNC: 148 MG/DL (ref 70–130)
GLUCOSE BLDC GLUCOMTR-MCNC: 167 MG/DL (ref 70–130)
GLUCOSE SERPL-MCNC: 128 MG/DL (ref 65–99)
GLUCOSE SERPL-MCNC: 148 MG/DL (ref 65–99)
GLUCOSE SERPL-MCNC: 152 MG/DL (ref 65–99)
HCO3 BLDA-SCNC: 25.6 MMOL/L (ref 20–26)
HCT VFR BLD AUTO: 33.3 % (ref 37.5–51)
HCT VFR BLD CALC: 38.8 % (ref 38–51)
HGB BLD-MCNC: 11.4 G/DL (ref 13–17.7)
HGB BLDA-MCNC: 12.7 G/DL (ref 13.5–17.5)
INHALED O2 CONCENTRATION: 100 %
IPAP: 22
MCH RBC QN AUTO: 29.2 PG (ref 26.6–33)
MCHC RBC AUTO-ENTMCNC: 34.2 G/DL (ref 31.5–35.7)
MCV RBC AUTO: 85.4 FL (ref 79–97)
METHGB BLD QL: 0.5 % (ref 0–1.5)
MODALITY: ABNORMAL
NOTE: ABNORMAL
OSMOLALITY UR: 264 MOSM/KG (ref 300–1100)
OXYHGB MFR BLDV: 87.5 % (ref 94–99)
PCO2 BLDA: 33.1 MM HG (ref 35–45)
PCO2 TEMP ADJ BLD: 33.1 MM HG (ref 35–48)
PH BLDA: 7.5 PH UNITS (ref 7.35–7.45)
PH, TEMP CORRECTED: 7.5 PH UNITS
PLATELET # BLD AUTO: 182 10*3/MM3 (ref 140–450)
PMV BLD AUTO: 10.4 FL (ref 6–12)
PO2 BLDA: 51.8 MM HG (ref 83–108)
PO2 TEMP ADJ BLD: 51.8 MM HG (ref 83–108)
POTASSIUM SERPL-SCNC: 3.7 MMOL/L (ref 3.5–5.2)
RBC # BLD AUTO: 3.9 10*6/MM3 (ref 4.14–5.8)
SET MECH RESP RATE: 4
SODIUM SERPL-SCNC: 123 MMOL/L (ref 136–145)
SODIUM SERPL-SCNC: 123 MMOL/L (ref 136–145)
SODIUM SERPL-SCNC: 124 MMOL/L (ref 136–145)
SODIUM UR-SCNC: 28 MMOL/L
WBC NRBC COR # BLD: 14.01 10*3/MM3 (ref 3.4–10.8)

## 2022-12-25 PROCEDURE — 99232 SBSQ HOSP IP/OBS MODERATE 35: CPT | Performed by: INTERNAL MEDICINE

## 2022-12-25 PROCEDURE — 63710000001 INSULIN LISPRO (HUMAN) PER 5 UNITS: Performed by: INTERNAL MEDICINE

## 2022-12-25 PROCEDURE — 80048 BASIC METABOLIC PNL TOTAL CA: CPT | Performed by: STUDENT IN AN ORGANIZED HEALTH CARE EDUCATION/TRAINING PROGRAM

## 2022-12-25 PROCEDURE — 83935 ASSAY OF URINE OSMOLALITY: CPT | Performed by: INTERNAL MEDICINE

## 2022-12-25 PROCEDURE — 85027 COMPLETE CBC AUTOMATED: CPT | Performed by: STUDENT IN AN ORGANIZED HEALTH CARE EDUCATION/TRAINING PROGRAM

## 2022-12-25 PROCEDURE — 99233 SBSQ HOSP IP/OBS HIGH 50: CPT | Performed by: INTERNAL MEDICINE

## 2022-12-25 PROCEDURE — 93010 ELECTROCARDIOGRAM REPORT: CPT | Performed by: STUDENT IN AN ORGANIZED HEALTH CARE EDUCATION/TRAINING PROGRAM

## 2022-12-25 PROCEDURE — 71045 X-RAY EXAM CHEST 1 VIEW: CPT

## 2022-12-25 PROCEDURE — 94761 N-INVAS EAR/PLS OXIMETRY MLT: CPT

## 2022-12-25 PROCEDURE — 36600 WITHDRAWAL OF ARTERIAL BLOOD: CPT

## 2022-12-25 PROCEDURE — 83050 HGB METHEMOGLOBIN QUAN: CPT

## 2022-12-25 PROCEDURE — 80048 BASIC METABOLIC PNL TOTAL CA: CPT | Performed by: NURSE PRACTITIONER

## 2022-12-25 PROCEDURE — 0 IOPAMIDOL PER 1 ML: Performed by: THORACIC SURGERY (CARDIOTHORACIC VASCULAR SURGERY)

## 2022-12-25 PROCEDURE — 84300 ASSAY OF URINE SODIUM: CPT | Performed by: INTERNAL MEDICINE

## 2022-12-25 PROCEDURE — 25010000002 CEFAZOLIN IN DEXTROSE 2-4 GM/100ML-% SOLUTION: Performed by: STUDENT IN AN ORGANIZED HEALTH CARE EDUCATION/TRAINING PROGRAM

## 2022-12-25 PROCEDURE — 71275 CT ANGIOGRAPHY CHEST: CPT

## 2022-12-25 PROCEDURE — 93005 ELECTROCARDIOGRAM TRACING: CPT | Performed by: STUDENT IN AN ORGANIZED HEALTH CARE EDUCATION/TRAINING PROGRAM

## 2022-12-25 PROCEDURE — 82805 BLOOD GASES W/O2 SATURATION: CPT

## 2022-12-25 PROCEDURE — 94799 UNLISTED PULMONARY SVC/PX: CPT

## 2022-12-25 PROCEDURE — 82570 ASSAY OF URINE CREATININE: CPT | Performed by: INTERNAL MEDICINE

## 2022-12-25 PROCEDURE — 25010000002 ONDANSETRON PER 1 MG: Performed by: STUDENT IN AN ORGANIZED HEALTH CARE EDUCATION/TRAINING PROGRAM

## 2022-12-25 PROCEDURE — 25010000002 MORPHINE PER 10 MG: Performed by: THORACIC SURGERY (CARDIOTHORACIC VASCULAR SURGERY)

## 2022-12-25 PROCEDURE — 80048 BASIC METABOLIC PNL TOTAL CA: CPT | Performed by: INTERNAL MEDICINE

## 2022-12-25 PROCEDURE — 82962 GLUCOSE BLOOD TEST: CPT

## 2022-12-25 PROCEDURE — 94660 CPAP INITIATION&MGMT: CPT

## 2022-12-25 PROCEDURE — 99024 POSTOP FOLLOW-UP VISIT: CPT | Performed by: THORACIC SURGERY (CARDIOTHORACIC VASCULAR SURGERY)

## 2022-12-25 PROCEDURE — 82375 ASSAY CARBOXYHB QUANT: CPT

## 2022-12-25 RX ORDER — AMLODIPINE BESYLATE 10 MG/1
10 TABLET ORAL
Status: DISCONTINUED | OUTPATIENT
Start: 2022-12-25 | End: 2022-12-30 | Stop reason: HOSPADM

## 2022-12-25 RX ORDER — FUROSEMIDE 10 MG/ML
40 INJECTION INTRAMUSCULAR; INTRAVENOUS ONCE
Status: DISCONTINUED | OUTPATIENT
Start: 2022-12-25 | End: 2022-12-25

## 2022-12-25 RX ORDER — BUMETANIDE 0.25 MG/ML
2 INJECTION INTRAMUSCULAR; INTRAVENOUS ONCE
Status: COMPLETED | OUTPATIENT
Start: 2022-12-25 | End: 2022-12-25

## 2022-12-25 RX ORDER — BUMETANIDE 0.25 MG/ML
0.5 INJECTION INTRAMUSCULAR; INTRAVENOUS ONCE
Status: DISCONTINUED | OUTPATIENT
Start: 2022-12-25 | End: 2022-12-25

## 2022-12-25 RX ADMIN — GABAPENTIN 100 MG: 100 CAPSULE ORAL at 05:52

## 2022-12-25 RX ADMIN — AMLODIPINE BESYLATE 10 MG: 10 TABLET ORAL at 08:42

## 2022-12-25 RX ADMIN — ASPIRIN 325 MG: 325 TABLET, COATED ORAL at 08:42

## 2022-12-25 RX ADMIN — SACUBITRIL AND VALSARTAN 1 TABLET: 24; 26 TABLET, FILM COATED ORAL at 08:42

## 2022-12-25 RX ADMIN — OXYCODONE 10 MG: 5 TABLET ORAL at 02:09

## 2022-12-25 RX ADMIN — ONDANSETRON 4 MG: 2 INJECTION INTRAMUSCULAR; INTRAVENOUS at 12:37

## 2022-12-25 RX ADMIN — SENNOSIDES AND DOCUSATE SODIUM 2 TABLET: 50; 8.6 TABLET ORAL at 08:42

## 2022-12-25 RX ADMIN — SENNOSIDES AND DOCUSATE SODIUM 2 TABLET: 50; 8.6 TABLET ORAL at 21:00

## 2022-12-25 RX ADMIN — ACETAMINOPHEN 650 MG: 325 TABLET ORAL at 21:00

## 2022-12-25 RX ADMIN — METOPROLOL TARTRATE 50 MG: 50 TABLET, FILM COATED ORAL at 21:00

## 2022-12-25 RX ADMIN — METOPROLOL TARTRATE 50 MG: 50 TABLET, FILM COATED ORAL at 08:42

## 2022-12-25 RX ADMIN — OXYCODONE 10 MG: 5 TABLET ORAL at 16:48

## 2022-12-25 RX ADMIN — ACETAMINOPHEN 650 MG: 325 TABLET ORAL at 14:53

## 2022-12-25 RX ADMIN — HYDROCODONE BITARTRATE AND ACETAMINOPHEN 1 TABLET: 7.5; 325 TABLET ORAL at 10:57

## 2022-12-25 RX ADMIN — BUMETANIDE 2 MG: 0.25 INJECTION, SOLUTION INTRAMUSCULAR; INTRAVENOUS at 08:41

## 2022-12-25 RX ADMIN — MORPHINE SULFATE 2 MG: 2 INJECTION, SOLUTION INTRAMUSCULAR; INTRAVENOUS at 16:50

## 2022-12-25 RX ADMIN — ROSUVASTATIN 20 MG: 20 TABLET, FILM COATED ORAL at 21:00

## 2022-12-25 RX ADMIN — IOPAMIDOL 81 ML: 755 INJECTION, SOLUTION INTRAVENOUS at 20:24

## 2022-12-25 RX ADMIN — NITROGLYCERIN 150 MCG/MIN: 20 INJECTION INTRAVENOUS at 05:49

## 2022-12-25 RX ADMIN — GABAPENTIN 100 MG: 100 CAPSULE ORAL at 21:00

## 2022-12-25 RX ADMIN — SACUBITRIL AND VALSARTAN 1 TABLET: 24; 26 TABLET, FILM COATED ORAL at 21:00

## 2022-12-25 RX ADMIN — ACETAMINOPHEN 650 MG: 325 TABLET ORAL at 05:52

## 2022-12-25 RX ADMIN — INSULIN LISPRO 2 UNITS: 100 INJECTION, SOLUTION INTRAVENOUS; SUBCUTANEOUS at 12:38

## 2022-12-25 RX ADMIN — NITROGLYCERIN 120 MCG/MIN: 20 INJECTION INTRAVENOUS at 02:09

## 2022-12-25 RX ADMIN — CEFAZOLIN SODIUM 2 G: 2 INJECTION, SOLUTION INTRAVENOUS at 02:09

## 2022-12-25 RX ADMIN — NITROGLYCERIN 30 MCG/MIN: 20 INJECTION INTRAVENOUS at 18:40

## 2022-12-25 RX ADMIN — CLOPIDOGREL BISULFATE 75 MG: 75 TABLET ORAL at 10:57

## 2022-12-25 RX ADMIN — MORPHINE SULFATE 2 MG: 2 INJECTION, SOLUTION INTRAMUSCULAR; INTRAVENOUS at 09:31

## 2022-12-25 RX ADMIN — OXYCODONE 10 MG: 5 TABLET ORAL at 05:51

## 2022-12-25 RX ADMIN — MORPHINE SULFATE 2 MG: 2 INJECTION, SOLUTION INTRAMUSCULAR; INTRAVENOUS at 18:35

## 2022-12-25 NOTE — PROGRESS NOTES
"INTENSIVIST   PROGRESS NOTE     Hospital:  LOS: 2 days     Chief Complaint: Postoperative management    Subjective   S     Interval History: Patient has remained on high flow nasal cannula overnight, which has been titrated down slowly.  Patient denies progressive dyspnea.  No chest pain, cough, hemoptysis, syncope, presyncope. These symptoms were overall stable with ambulation.  This morning he is afebrile and hemodynamically stable.  Sitting upright in bedside chair during assessment.  Appears and feels subjectively better compared to yesterday.    The patient's relevant past medical, surgical and social history were reviewed and updated in Epic as appropriate.      ROS: Fourteen point review of system performed and negative except for that mentioned in HPI.     Objective   O     Intake/Ouptut 24 hrs (7:00AM - 6:59 AM)  Intake & Output (last 3 days)       12/22 0701 12/23 0700 12/23 0701 12/24 0700 12/24 0701 12/25 0700 12/25 0701 12/26 0700    P.O.  1000 2200     I.V. (mL/kg)  2522 (21.6) 1869.5 (16)     Blood  771      IV Piggyback  300 200     Total Intake(mL/kg)  4593 (39.3) 4269.5 (36.5)     Urine (mL/kg/hr)  2520 (0.9) 1050 (0.4)     Emesis/NG output  50      Chest Tube  560 310     Total Output  3130 1360     Net  +1463 +2909.5                 Medications (drips):  dexmedetomidine, Last Rate: 0.4 mcg/kg/hr (12/23/22 1700)  niCARdipine, Last Rate: Stopped (12/23/22 1230)  nitroglycerin, Last Rate: 150 mcg/min (12/25/22 0549)  norepinephrine, Last Rate: Stopped (12/24/22 0000)  phenylephrine      Respiratory Support: On high flow nasal cannula    Physical Examination:  Vital Signs: Blood pressure 152/81, pulse 77, temperature 99 °F (37.2 °C), temperature source Bladder, resp. rate 26, height 193 cm (75.98\"), weight 117 kg (258 lb), SpO2 96 %.    General: The patient appears in no acute distress. Alert, cooperative and interactive.  Neck: Trachea midline, No masses.  Chest: Clear to auscultation " bilaterally, No wheezing, rhonchi, or rales. Normal work of breathing. Equal chest rise. On high flow nasal cannula.   Cardiac: Regular rhythm, normal rate, S1S2 auscultated. No murmurs, rubs or gallops.   Extremities: No lower extremity edema. No clubbing or cyanosis.  Neuro: Motor power grossly intact bilaterally. Sensation intact. Speech fluid and fluent. Thought process coherent.  Psych: Alert and oriented x3. Mood stable.    Lines, Drains & Airways     Active LDAs     Name Placement date Placement time Site Days    CVC Double Lumen 12/23/22 Right Internal jugular 12/23/22  0720  created via procedure documentation  Internal jugular  1    Peripheral IV 12/23/22 0606 Left;Medial Wrist 12/23/22 0606  Wrist  1    Urethral Catheter Temperature probe;Silicone 16 Fr. 12/23/22  --  -- 1    Y Chest Tube 1 and 2 1 Right Mediastinal 32 Fr. 2 Right Mediastinal 32 Fr. 12/23/22  --  -- 1    Y Chest Tube 3 and 4 3 Right Mediastinal 32 Fr. 4 Right Mediastinal 32 Fr. 12/23/22  --  -- 1    Arterial Line 12/23/22 Right Radial 12/23/22  0630  created via procedure documentation  Radial  1              Results from last 7 days   Lab Units 12/25/22  0505 12/24/22  0329 12/23/22  1518   WBC 10*3/mm3 14.01* 12.22* 13.63*   HEMOGLOBIN g/dL 11.4* 11.9* 12.0*   MCV fL 85.4 88.1 87.4   PLATELETS 10*3/mm3 182 181 182     Results from last 7 days   Lab Units 12/25/22  0505 12/24/22  0329 12/23/22  1518 12/23/22  1122   SODIUM mmol/L 123* 133* 138 136   POTASSIUM mmol/L 3.7 4.2 3.9 4.8   CO2 mmol/L 24.0 22.0 23.0 24.0   CREATININE mg/dL 0.68* 0.84 0.77 0.79   GLUCOSE mg/dL 152* 156* 170* 179*   MAGNESIUM mg/dL  --  2.4 2.5 1.9   PHOSPHORUS mg/dL  --  3.4 3.7 4.3     Estimated Creatinine Clearance: 189.9 mL/min (A) (by C-G formula based on SCr of 0.68 mg/dL (L)).  Results from last 7 days   Lab Units 12/19/22  0836   ALK PHOS U/L 112   BILIRUBIN mg/dL 0.2   ALT (SGPT) U/L 16   AST (SGOT) U/L 17       Results from last 7 days   Lab Units  12/23/22  1631 12/23/22  1121   PH, ARTERIAL pH units 7.332* 7.330*   PCO2, ARTERIAL mm Hg 45.4* 47.7*   PO2 ART mm Hg 68.6* 121.0*   FIO2 % 40 100     CXR (12/25/2022):  Radiology impression:  Small left apical pneumothorax appears mildly increased, from 14 mm to  23 mm of apical pleural separation. There is a subtle suggestion of a  pleural line in the left apex, suggesting a 2 mm pneumothorax here. Left  thoracotomy tube remains in place. Patchy left basilar atelectasis is  improving. Heart is enlarged. Vasculature appears normal.      1. Mildly increased right apical pneumothorax since yesterday's 12:50 PM exam.  2. Minute, 2 mm left apical pneumothorax now seen.  3. Improving left basilar atelectasis.    Results: Reviewed.  I reviewed the patient's new laboratory and imaging results.  I independently reviewed the patient's new images.    Medications: Reviewed.    Assessment & Plan   A / P     Active Hospital Problems    Diagnosis    • **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 pectoris (HCC)    • Coronary artery disease of native artery of native heart with stable angina pectoris (HCC)    • NSVT (nonsustained ventricular tachycardia)    • DM (diabetes mellitus), type 2 (HCC)    • Essential hypertension      Patient Ronal is a 47M with a history of HTN, HLD, T2DM, VT, ischemic cardiomyopathy, CAD and ongoing tobacco use who underwent CABG x 2 by Dr. Walker (12/23). He is admitted to the ICU for postoperative management.  While he did not have any intra-operative arrhythmias, patient placed on Amiodarone infusion for prophylaxis secondary to his history of VT. Hospital course has been complicated by continued hypoxic respiratory failure requiring high flow nasal cannula.  Overall stable to slightly improved.    Lab evaluation this morning significant for hyponatremia.  Sodium went from 133-123 over the last 24 hours.  Recommended recheck after diuresis-- which was  switched from Lasix to Bumex this morning.  Bedside nursing notes better response in terms of UOP after this medication change. Reasonable to recheck sodium this evening before initiation of fluids or further intervention.    Preoperative spirometry reviewed and negative for airway obstruction.     -Extubated and now on high flow nasal cannula; titrate as tolerated  -Continue Amiodarone infusion for 24 hours.   -Insulin drip for diabetes management; Transitioned (12/24)  -Chest tube management per CTS  -Pain control/supportive care  -Needs smoking cessation  -Plan to reinitiate Plavix (12/24)    High level of risk due to:  severe exacerbation of chronic illness and illness with threat to life or bodily function.    Plan of care and goals reviewed during interdisciplinary rounds.  I discussed the patient's findings and my recommendations with patient    -- Ko Garcia MD  Pulmonary/Critical Care

## 2022-12-25 NOTE — PROGRESS NOTES
Stone Villeda  [id number]  1975   LOS: 2 days   Patient Care Team:  Amita Crook APRN as PCP - General (Nurse Practitioner)    Chief Complaint:  IHD / CABG x 2 / T2 DM / HYPONATREMIA    Subjective     Comfortable up in chair on high flow oxygen therapy.  He denies uncontrolled incisional pain, increased tachypnea/dyspnea, nausea, emesis, abdominal pain, headache, or focal motor-sensory change.  Fair appetite.    Objective     Vital Sign Min/Max for last 24 hours  Temp  Min: 96.1 °F (35.6 °C)  Max: 99.1 °F (37.3 °C)   BP  Min: 122/67  Max: 162/87   Pulse  Min: 67  Max: 87   Resp  Min: 24  Max: 30   SpO2  Min: 81 %  Max: 99 %               12/23/22  0612   Weight: 117 kg (258 lb)         Intake/Output Summary (Last 24 hours) at 12/25/2022 0741  Last data filed at 12/25/2022 0700  Gross per 24 hour   Intake 4269.5 ml   Output 1360 ml   Net 2909.5 ml       Physical Exam:     General Appearance:    Alert, cooperative, in no acute distress   Lungs:    Bibasilar crackles more notable in the left with overall diminished breath sounds,respirations regular, even and            unlabored    Heart:    Regular and normal rate, normal S1 and S2, grade 1/6 systolic murmur, no gallop, faint biphasic rub, no click   Abdomen:  Extremities:   Soft, non-tender, bowel sounds audible x4    No edema, normal range of motion   Pulses:   Pulses palpable and equal bilaterally      Results Review:   Results from last 7 days   Lab Units 12/25/22  0505 12/24/22  0329 12/23/22  1518   SODIUM mmol/L 123* 133* 138   POTASSIUM mmol/L 3.7 4.2 3.9   CHLORIDE mmol/L 89* 102 106   CO2 mmol/L 24.0 22.0 23.0   BUN mg/dL 12 12 8   CREATININE mg/dL 0.68* 0.84 0.77   GLUCOSE mg/dL 152* 156* 170*   CALCIUM mg/dL 8.1* 8.3* 8.1*     Results from last 7 days   Lab Units 12/25/22  0505 12/24/22  0329 12/23/22  1518   WBC 10*3/mm3 14.01* 12.22* 13.63*   HEMOGLOBIN g/dL 11.4* 11.9* 12.0*   HEMATOCRIT % 33.3* 34.7* 34.8*   PLATELETS 10*3/mm3 182 181 182      Results from last 7 days   Lab Units 12/19/22  0836   HEMOGLOBIN A1C % 6.10*     · ACCU-CHEKS: 190 - 152 - 148 MG/DL.    · CXR:    FINDINGS:    Small left apical pneumothorax appears mildly increased, from 14 mm to  23 mm of apical pleural separation. There is a subtle suggestion of a  pleural line in the left apex, suggesting a 2 mm pneumothorax here. Left  thoracotomy tube remains in place. Patchy left basilar atelectasis is  improving. Heart is enlarged. Vasculature appears normal.      IMPRESSIONS:     1. Mildly increased right apical pneumothorax since yesterday's 12:50 PM  exam.  2. Minute, 2 mm left apical pneumothorax now seen.  3. Improving left basilar atelectasis.    · EKG:    Normal sinus rhythm  Possible Inferior infarct , age undetermined  Lateral injury pattern  ** ** ACUTE MI ** **  Abnormal ECG  When compared with ECG of 24-DEC-2022 03:26,  No significant change was found    Medication Review: REVIEWED; NO DRIPS.    Assessment & Plan     Abnormal chest x-ray as well as electrocardiogram with lateral related to pericarditis.  Significant hyponatremia. Patient received IV Lasix 40 mg times once this morning.  Would consider normal saline versus single dose of tolvaptan 15 mg p.o. times once if urine sodium assessment appropriate.  Will restart Entresto therapy.      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 pectoris ([facility])    Essential hypertension    DM (diabetes mellitus), type 2 ([facility])    NSVT (nonsustained ventricular tachycardia)    Coronary artery disease of native artery of native heart with stable angina pectoris ([facility])    12/25/22  07:41 EST

## 2022-12-25 NOTE — PROGRESS NOTES
CTS Progress Note       LOS: 2 days   Patient Care Team:  Amita Crook APRN as PCP - General (Nurse Practitioner)    Chief Complaint: 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 pectoris (HCC)    Vital Signs:  Temp:  [96.1 °F (35.6 °C)-99.1 °F (37.3 °C)] 99 °F (37.2 °C)  Heart Rate:  [67-87] 77  Resp:  [24-30] 26  BP: (122-162)/(67-88) 152/81  Arterial Line BP: (126-158)/() 135/109    Physical Exam: On high flow nasal cannula but up in chair in no distress well breathing very conversant       Results:   Results from last 7 days   Lab Units 12/25/22  0505   WBC 10*3/mm3 14.01*   HEMOGLOBIN g/dL 11.4*   HEMATOCRIT % 33.3*   PLATELETS 10*3/mm3 182     Results from last 7 days   Lab Units 12/25/22  0505   SODIUM mmol/L 123*   POTASSIUM mmol/L 3.7   CHLORIDE mmol/L 89*   CO2 mmol/L 24.0   BUN mg/dL 12   CREATININE mg/dL 0.68*   GLUCOSE mg/dL 152*   CALCIUM mg/dL 8.1*           Imaging Results (Last 24 Hours)     Procedure Component Value Units Date/Time    XR Chest 1 View [[id number]] Resulted: 12/25/22 0421     Updated: 12/25/22 0421    XR Chest 1 View [[id number]] Collected: 12/24/22 1345     Updated: 12/24/22 1350    Narrative:      DATE OF EXAM: 12/24/2022 1:12 PM     PROCEDURE: XR CHEST 1 VW-     INDICATIONS: hypoxia; I25.119-Atherosclerotic heart disease of native  coronary artery with unspecified angina pectoris     COMPARISON: Single frontal view chest performed on December 24, 2022     TECHNIQUE: Single radiographic AP view of the chest was obtained.     FINDINGS:  Single frontal view chest reveals proximal tip of the right internal  jugular central venous line catheter projects over the superior vena  cava. The patient is status post sternotomy. The heart is mildly  enlarged and is unchanged. Superior mediastinum is normal. There is a  small infiltrate and possibly small pleural effusion left lower lobe  lung unchanged since previous study.  There is a  questionable small  right apical pneumothorax unchanged significantly since previous study       Impression:         1. Small infiltrate and possibly small pleural effusion left lower lobe  lung unchanged since previous study performed on December 24, 2022  2. Questionable small right apical pneumothorax unchanged since previous  study performed on December 24, 2022     This report was finalized on 12/24/2022 1:46 PM by Jasen Guevara MD.             Assessment      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 pectoris (HCC)    Essential hypertension    DM (diabetes mellitus), type 2 (HCC)    NSVT (nonsustained ventricular tachycardia)    Coronary artery disease of native artery of native heart with stable angina pectoris (HCC)        Plan   Discontinue chest tubes and pacer wires  Lasix 40 mg IV x1    Please note that portions of this note were completed with a voice recognition program. Efforts were made to edit the dictations, but occasionally words are mistranscribed.    Norris Walker MD  12/25/22  07:30 EST

## 2022-12-25 NOTE — CASE MANAGEMENT/SOCIAL WORK
Discharge Planning Assessment  [facility]     Patient Name: Stone Villeda  MRN: [id number]  Today's Date: 12/25/2022    Admit Date: 12/23/2022    Plan: home   Discharge Needs Assessment     Row Name 12/25/22 1118       Living Environment    People in Home alone    Current Living Arrangements home    Primary Care Provided by self    Provides Primary Care For no one    Family Caregiver if Needed sibling(s)    Family Caregiver Names Gilles Villeda    Quality of Family Relationships involved;helpful    Able to Return to Prior Arrangements yes       Resource/Environmental Concerns    Resource/Environmental Concerns none       Transition Planning    Patient/Family Anticipates Transition to home with family    Patient/Family Anticipated Services at Transition none    Transportation Anticipated family or friend will provide       Discharge Needs Assessment    Readmission Within the Last 30 Days no previous admission in last 30 days    Equipment Currently Used at Home none    Concerns to be Addressed discharge planning    Anticipated Changes Related to Illness none    Equipment Needed After Discharge none               Discharge Plan     Row Name 12/25/22 1119       Plan    Plan home    Patient/Family in Agreement with Plan yes    Plan Comments Lives in [facility] alone. He reports he is independent with ADLs and denies use of any DME. Pt is followed by her PCP and has drug coverag. At this time his plan for discharge is to return home. CM will continue to follow for any discharge needs.    Final Discharge Disposition Code 01 - home or self-care              Continued Care and Services - Admitted Since 12/23/2022    Coordination has not been started for this encounter.       Expected Discharge Date and Time     Expected Discharge Date Expected Discharge Time    Dec 27, 2022          Demographic Summary     Row Name 12/25/22 1116       General Information    Admission Type inpatient    Referral Source physician     Reason for Consult discharge planning    General Information Comments PCP Amita Crook       Contact Information    Permission Granted to Share Info With family/designee    Contact Information Comments Gilles [phone]               Functional Status     Row Name 12/25/22 1116       Functional Status, IADL    Medications independent    Meal Preparation independent    Housekeeping independent    Laundry independent    Shopping independent       Mental Status    General Appearance WDL WDL               Psychosocial    No documentation.                Abuse/Neglect    No documentation.                Legal    No documentation.                Substance Abuse    No documentation.                Patient Forms    No documentation.                   Gayla Voar RN

## 2022-12-26 ENCOUNTER — APPOINTMENT (OUTPATIENT)
Dept: GENERAL RADIOLOGY | Facility: HOSPITAL | Age: 47
DRG: 235 | End: 2022-12-26
Payer: COMMERCIAL

## 2022-12-26 PROBLEM — J96.01 ACUTE RESPIRATORY FAILURE WITH HYPOXIA: Status: ACTIVE | Noted: 2022-12-26

## 2022-12-26 PROBLEM — E87.1 HYPONATREMIA: Status: ACTIVE | Noted: 2022-12-26

## 2022-12-26 LAB
ANION GAP SERPL CALCULATED.3IONS-SCNC: 7 MMOL/L (ref 5–15)
BUN SERPL-MCNC: 11 MG/DL (ref 6–20)
BUN/CREAT SERPL: 19.6 (ref 7–25)
CALCIUM SPEC-SCNC: 8.3 MG/DL (ref 8.6–10.5)
CHLORIDE SERPL-SCNC: 90 MMOL/L (ref 98–107)
CO2 SERPL-SCNC: 26 MMOL/L (ref 22–29)
CREAT SERPL-MCNC: 0.56 MG/DL (ref 0.76–1.27)
DEPRECATED RDW RBC AUTO: 39.1 FL (ref 37–54)
EGFRCR SERPLBLD CKD-EPI 2021: 123.1 ML/MIN/1.73
ERYTHROCYTE [DISTWIDTH] IN BLOOD BY AUTOMATED COUNT: 12.4 % (ref 12.3–15.4)
GLUCOSE BLDC GLUCOMTR-MCNC: 119 MG/DL (ref 70–130)
GLUCOSE BLDC GLUCOMTR-MCNC: 139 MG/DL (ref 70–130)
GLUCOSE BLDC GLUCOMTR-MCNC: 147 MG/DL (ref 70–130)
GLUCOSE BLDC GLUCOMTR-MCNC: 148 MG/DL (ref 70–130)
GLUCOSE SERPL-MCNC: 119 MG/DL (ref 65–99)
HCT VFR BLD AUTO: 34.8 % (ref 37.5–51)
HGB BLD-MCNC: 11.9 G/DL (ref 13–17.7)
MCH RBC QN AUTO: 29.5 PG (ref 26.6–33)
MCHC RBC AUTO-ENTMCNC: 34.2 G/DL (ref 31.5–35.7)
MCV RBC AUTO: 86.1 FL (ref 79–97)
PLATELET # BLD AUTO: 180 10*3/MM3 (ref 140–450)
PMV BLD AUTO: 11.2 FL (ref 6–12)
POTASSIUM SERPL-SCNC: 4.3 MMOL/L (ref 3.5–5.2)
QT INTERVAL: 414 MS
QTC INTERVAL: 465 MS
RBC # BLD AUTO: 4.04 10*6/MM3 (ref 4.14–5.8)
SODIUM SERPL-SCNC: 121 MMOL/L (ref 136–145)
SODIUM SERPL-SCNC: 122 MMOL/L (ref 136–145)
SODIUM SERPL-SCNC: 123 MMOL/L (ref 136–145)
SODIUM SERPL-SCNC: 125 MMOL/L (ref 136–145)
WBC NRBC COR # BLD: 11.59 10*3/MM3 (ref 3.4–10.8)

## 2022-12-26 PROCEDURE — 82962 GLUCOSE BLOOD TEST: CPT

## 2022-12-26 PROCEDURE — 94799 UNLISTED PULMONARY SVC/PX: CPT

## 2022-12-26 PROCEDURE — 84295 ASSAY OF SERUM SODIUM: CPT | Performed by: INTERNAL MEDICINE

## 2022-12-26 PROCEDURE — 99232 SBSQ HOSP IP/OBS MODERATE 35: CPT | Performed by: INTERNAL MEDICINE

## 2022-12-26 PROCEDURE — 71045 X-RAY EXAM CHEST 1 VIEW: CPT

## 2022-12-26 PROCEDURE — 84295 ASSAY OF SERUM SODIUM: CPT | Performed by: NURSE PRACTITIONER

## 2022-12-26 PROCEDURE — 99233 SBSQ HOSP IP/OBS HIGH 50: CPT | Performed by: INTERNAL MEDICINE

## 2022-12-26 PROCEDURE — 99024 POSTOP FOLLOW-UP VISIT: CPT | Performed by: THORACIC SURGERY (CARDIOTHORACIC VASCULAR SURGERY)

## 2022-12-26 PROCEDURE — 94660 CPAP INITIATION&MGMT: CPT

## 2022-12-26 PROCEDURE — 80048 BASIC METABOLIC PNL TOTAL CA: CPT | Performed by: STUDENT IN AN ORGANIZED HEALTH CARE EDUCATION/TRAINING PROGRAM

## 2022-12-26 PROCEDURE — 97530 THERAPEUTIC ACTIVITIES: CPT

## 2022-12-26 PROCEDURE — 94761 N-INVAS EAR/PLS OXIMETRY MLT: CPT

## 2022-12-26 PROCEDURE — 85027 COMPLETE CBC AUTOMATED: CPT | Performed by: STUDENT IN AN ORGANIZED HEALTH CARE EDUCATION/TRAINING PROGRAM

## 2022-12-26 RX ORDER — BUMETANIDE 0.25 MG/ML
2 INJECTION INTRAMUSCULAR; INTRAVENOUS ONCE
Status: COMPLETED | OUTPATIENT
Start: 2022-12-26 | End: 2022-12-26

## 2022-12-26 RX ORDER — METFORMIN HYDROCHLORIDE 500 MG/1
500 TABLET, EXTENDED RELEASE ORAL 2 TIMES DAILY
COMMUNITY

## 2022-12-26 RX ORDER — SODIUM CHLORIDE 1000 MG
1 TABLET, SOLUBLE MISCELLANEOUS
Status: DISCONTINUED | OUTPATIENT
Start: 2022-12-26 | End: 2022-12-27

## 2022-12-26 RX ORDER — 3% SODIUM CHLORIDE 3 G/100ML
50 INJECTION, SOLUTION INTRAVENOUS ONCE
Status: COMPLETED | OUTPATIENT
Start: 2022-12-26 | End: 2022-12-26

## 2022-12-26 RX ORDER — SODIUM CHLORIDE 3 G/100ML
50 INJECTION, SOLUTION INTRAVENOUS ONCE
Status: COMPLETED | OUTPATIENT
Start: 2022-12-26 | End: 2022-12-26

## 2022-12-26 RX ADMIN — AMLODIPINE BESYLATE 10 MG: 10 TABLET ORAL at 09:01

## 2022-12-26 RX ADMIN — SACUBITRIL AND VALSARTAN 1 TABLET: 49; 51 TABLET, FILM COATED ORAL at 09:01

## 2022-12-26 RX ADMIN — GABAPENTIN 100 MG: 100 CAPSULE ORAL at 05:14

## 2022-12-26 RX ADMIN — METOPROLOL TARTRATE 50 MG: 50 TABLET, FILM COATED ORAL at 20:11

## 2022-12-26 RX ADMIN — SODIUM CHLORIDE 1 G: 1 TABLET ORAL at 18:06

## 2022-12-26 RX ADMIN — BUMETANIDE 2 MG: 0.25 INJECTION, SOLUTION INTRAMUSCULAR; INTRAVENOUS at 09:36

## 2022-12-26 RX ADMIN — ASPIRIN 325 MG: 325 TABLET, COATED ORAL at 09:01

## 2022-12-26 RX ADMIN — METOPROLOL TARTRATE 50 MG: 50 TABLET, FILM COATED ORAL at 09:01

## 2022-12-26 RX ADMIN — SODIUM CHLORIDE 1 G: 1 TABLET ORAL at 11:54

## 2022-12-26 RX ADMIN — ACETAMINOPHEN 650 MG: 325 TABLET ORAL at 05:14

## 2022-12-26 RX ADMIN — ROSUVASTATIN 20 MG: 20 TABLET, FILM COATED ORAL at 20:11

## 2022-12-26 RX ADMIN — HYDROCODONE BITARTRATE AND ACETAMINOPHEN 1 TABLET: 7.5; 325 TABLET ORAL at 12:55

## 2022-12-26 RX ADMIN — SODIUM CHLORIDE 50 ML: 3 INJECTION, SOLUTION INTRAVENOUS at 17:51

## 2022-12-26 RX ADMIN — GABAPENTIN 100 MG: 100 CAPSULE ORAL at 13:56

## 2022-12-26 RX ADMIN — SACUBITRIL AND VALSARTAN 1 TABLET: 49; 51 TABLET, FILM COATED ORAL at 20:11

## 2022-12-26 RX ADMIN — SENNOSIDES AND DOCUSATE SODIUM 2 TABLET: 50; 8.6 TABLET ORAL at 09:01

## 2022-12-26 RX ADMIN — SODIUM CHLORIDE 50 ML: 3 INJECTION, SOLUTION INTRAVENOUS at 12:49

## 2022-12-26 RX ADMIN — ACETAMINOPHEN 650 MG: 325 TABLET ORAL at 21:11

## 2022-12-26 RX ADMIN — CLOPIDOGREL BISULFATE 75 MG: 75 TABLET ORAL at 09:01

## 2022-12-26 RX ADMIN — HYDROCODONE BITARTRATE AND ACETAMINOPHEN 1 TABLET: 7.5; 325 TABLET ORAL at 21:13

## 2022-12-26 RX ADMIN — GABAPENTIN 100 MG: 100 CAPSULE ORAL at 21:11

## 2022-12-26 RX ADMIN — ACETAMINOPHEN 650 MG: 325 TABLET ORAL at 13:56

## 2022-12-26 RX ADMIN — OXYCODONE 10 MG: 5 TABLET ORAL at 00:28

## 2022-12-26 NOTE — PLAN OF CARE
Goal Outcome Evaluation:  Plan of Care Reviewed With: patient        Progress: improving     Neuro:   Intact, pleasant. FRIAS =, strong. Ambu x 440 ft & 600 ft, did well.     Respiratory:   6L NC. Diminished left lower lobe.     Cardiac:   S1,S2. Palpable pedal pulses. NSR. PO BP meds increased, nitro gtt weaned off.     GI/:   +BS, BM x 3.  Bumex 2mg given. UOP 2435 mL.     Other:    Afebrile.   Na 123 this AM. Na 1 gram tabs TID, 1500 mL fluid restriction, & one time bolus 3% hypertonic saline 50 mL given. Recheck Na 122, order to recheck at 1600 per intensivist APRN. 1600 recheck 121, additional 50 mL hypertonic saline bolus ordered and given, next recheck schedule for 2230.

## 2022-12-26 NOTE — CASE MANAGEMENT/SOCIAL WORK
Continued Stay Note  [facility]     Patient Name: Stone Villeda  MRN: [id number]  Today's Date: 12/26/2022    Admit Date: 12/23/2022    Plan: Home   Discharge Plan     Row Name 12/26/22 1038       Plan    Plan Home    Patient/Family in Agreement with Plan yes    Plan Comments Discussed patient in MDR.  Mr Villeda was up walking during rounds.  Yesterday he had increased shortness of breath, and required diuresis and bipap.  Today, he is on 4L O2 per NC. He was able to walk 440' with PT today, and PT is recommending home.  I spoke with Mr Villeda at bedside, and at this time, he denies any needs.  He does admit to not having oxygen at home. At this time, he also does not have a qualifying diagnosis for insurance to pay for oxygen (CHF and Cardiac Insufficiency are the only two cardiac diagnoses that qualify a patient for oxygen).  CM will continue to follow.    Final Discharge Disposition Code 01 - home or self-care               Discharge Codes    No documentation.               Expected Discharge Date and Time     Expected Discharge Date Expected Discharge Time    Dec 28, 2022             Megan Lan RN

## 2022-12-26 NOTE — PROGRESS NOTES
CTS Progress Note       LOS: 3 days   Patient Care Team:  Amita Crook APRN as PCP - General (Nurse Practitioner)    Chief Complaint: 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 pectoris (HCC)    Vital Signs:  Temp:  [96.7 °F (35.9 °C)-98.2 °F (36.8 °C)] 98 °F (36.7 °C)  Heart Rate:  [69-89] 77  Resp:  [19-37] 22  BP: (115-158)/(67-92) 130/83    Physical Exam: Alert and conversant       Results:   Results from last 7 days   Lab Units 12/25/22  0505   WBC 10*3/mm3 14.01*   HEMOGLOBIN g/dL 11.4*   HEMATOCRIT % 33.3*   PLATELETS 10*3/mm3 182     Results from last 7 days   Lab Units 12/26/22  0453   SODIUM mmol/L 123*   POTASSIUM mmol/L 4.3   CHLORIDE mmol/L 90*   CO2 mmol/L 26.0   BUN mg/dL 11   CREATININE mg/dL 0.56*   GLUCOSE mg/dL 119*   CALCIUM mg/dL 8.3*           Imaging Results (Last 24 Hours)     Procedure Component Value Units Date/Time    XR Chest 1 View [[id number]] Resulted: 12/26/22 0549     Updated: 12/26/22 0549    CT Angiogram Chest Pulmonary Embolism [[id number]] Collected: 12/25/22 2030     Updated: 12/25/22 2037    Narrative:      CT ANGIOGRAM CHEST PULMONARY EMBOLISM    Date of Exam: 12/25/2022 8:10 PM EST    Indication: Pulmonary embolism (PE) suspected, unknown D-dimer.    Comparison: None available.    Technique: Axial CT images were obtained of the chest before and after the uneventful intravenous administration of 75 mL Isovue-370 utilizing pulmonary embolism protocol.  Reconstructed coronal and sagittal images were also obtained. Automated exposure   control and iterative construction methods were used.    Findings:  The main pulmonary artery normal in caliber. No evidence of pulmonary embolism is identified bilaterally. Right IJ catheter in satisfactory position. There is a right sided chest tube tract with a small amount of residual pneumomediastinum. There is also   a small amount of pneumomediastinum within the pericardial fat pad anteriorly.  The esophagus is normal. There are scattered mildly prominent lymph nodes which are favored to be reactive.    There is a small right base pneumothorax. There is partial consolidation of the right lower lobe with extensive right lower lobe volume loss, likely atelectasis, superimposed pneumonia is not excluded. There is complete left lower lobe collapse with   volume loss, likely atelectasis. There are filling defects within the left lower lobe bronchi which may reflect mucous plugging. There are scattered other areas of opacity within the aerated lungs, favored to represent atelectasis. There is a small left   pleural effusion.    Limited views of the upper abdomen demonstrate mild subcutaneous emphysema likely from recent chest tubes. No aggressive appearing lytic or sclerotic bone lesions are identified.      Impression:      Impression:    1. No evidence of pulmonary embolism is identified.  2. Partial right lower lobe, and complete left lower lobe consolidation with volume loss. These findings are likely reflective of atelectasis, superimposed pneumonia is not excluded.  3. Very small right pneumothorax  4. Very small left pleural effusion  5. Small amount of pneumomediastinum and subcutaneous emphysema, not unexpected given recent surgery.    Electronically Signed: Reese Hair MD    12/25/2022 8:35 PM EST    Workstation ID: [id number]    XR Chest 1 View [[id number]] Collected: 12/25/22 1819     Updated: 12/25/22 1824    Narrative:      DATE OF EXAM: 12/25/2022 5:25 PM     PROCEDURE: XR CHEST 1 VW-     INDICATIONS: Chest Tube Removal - Desats; I25.119-Atherosclerotic heart  disease of native coronary artery with unspecified angina pectoris     COMPARISON: 4:06 AM exam of today's date     TECHNIQUE: Single radiographic AP view of the chest was obtained.     FINDINGS:  Image timed 5:12 PM shows small, decreasing right apical pneumothorax,  previously with 23 mm of apical pleural separation, now 9 mm. Minute, 1  to  2 mm left apical pneumothorax is barely visible. Right IJ catheter  remains in the mid SVC. Left basilar atelectasis appears improved. Mild  patchy right basilar atelectasis may be minimally increased. Lungs  appear clear elsewhere.        Impression:         1. Small, resolving right apical pneumothorax. Trace, if any remaining  left apical pneumothorax.  2. Mild patchy bibasilar atelectasis left greater than right, as noted.     This report was finalized on 12/25/2022 6:21 PM by Dr. Jonnie Shrsetha MD.       XR Chest 1 View [[id number]] Collected: 12/25/22 0731     Updated: 12/25/22 0736    Narrative:      DATE OF EXAM: 12/25/2022 4:05 AM     PROCEDURE: XR CHEST 1 VW-     INDICATIONS: Post-Op Heart Surgery; I25.119-Atherosclerotic heart  disease of native coronary artery with unspecified angina pectoris     COMPARISON: 12/24/2022     TECHNIQUE: Single radiographic AP view of the chest was obtained.     FINDINGS:  Small left apical pneumothorax appears mildly increased, from 14 mm to  23 mm of apical pleural separation. There is a subtle suggestion of a  pleural line in the left apex, suggesting a 2 mm pneumothorax here. Left  thoracotomy tube remains in place. Patchy left basilar atelectasis is  improving. Heart is enlarged. Vasculature appears normal.        Impression:         1. Mildly increased right apical pneumothorax since yesterday's 12:50 PM  exam.  2. Minute, 2 mm left apical pneumothorax now seen.  3. Improving left basilar atelectasis.     This report was finalized on 12/25/2022 7:33 AM by Dr. Jonnie Shrestha MD.             Assessment      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 pectoris (HCC)    Essential hypertension    DM (diabetes mellitus), type 2 (HCC)    NSVT (nonsustained ventricular tachycardia)    Coronary artery disease of native artery of native heart with stable angina pectoris (HCC)    Appears to be breathing unlabored and walked over 400 feet yesterday  but is still on BiPAP at 40%.  He diuresed 2 L yesterday but still appears slightly edematous    Plan   Bumex 2 mg IV x1 now  CBC, BMP, chest x-ray in the a.m.    Please note that portions of this note were completed with a voice recognition program. Efforts were made to edit the dictations, but occasionally words are mistranscribed.    Norris Walker MD  12/26/22  06:52 EST

## 2022-12-26 NOTE — PLAN OF CARE
Goal Outcome Evaluation:      Remained A&OX4 throughout shift. Able to wean O2 to 40% on bipap. Diminished lung sounds in bases left greater than right. IS to 2000ml. SR. NTG at 30mcg for SBP <140. Urine output adequate. Denied nausea but no appetite. Walked 440ft without SOA. Gait very steady. Pain controlled with pain meds.

## 2022-12-26 NOTE — NURSING NOTE
Pt to radiology at 2001 on 80% NRB via WC X2 RN, cardiac, BP, and sat monitor. NTG continues. -140. SAts > 95%. Tolerated procedure without complaints. Returned to room safely.

## 2022-12-26 NOTE — PROGRESS NOTES
Stone Villeda  [id number]  1975   LOS: 3 days   Patient Care Team:  Amita Crook APRN as PCP - General (Nurse Practitioner)    Chief Complaint:  IHD / CABG x 2 / T2 DM / HYPONATREMIA / HYPOXEMIA    Subjective     Comfortable sitting up in chair.  Ate all of breakfast.  No increased tachypnea/dyspnea, uncontrolled incisional pain, nausea, emesis, abdominal pain, headache, or focal motor-sensory changes.  Acceptable cough and incentive spirometry use.  No current complaints.  Currently on 4 L/min nasal cannula (oximetry 94%).    Objective     Vital Sign Min/Max for last 24 hours  Temp  Min: 97.4 °F (36.3 °C)  Max: 98.2 °F (36.8 °C)   BP  Min: 115/68  Max: 158/81   Pulse  Min: 69  Max: 89   Resp  Min: 19  Max: 37   SpO2  Min: 87 %  Max: 99 %               12/23/22  0612   Weight: 117 kg (258 lb)         Intake/Output Summary (Last 24 hours) at 12/26/2022 0815  Last data filed at 12/26/2022 0600  Gross per 24 hour   Intake 605 ml   Output 4075 ml   Net -3470 ml       Physical Exam:     General Appearance:    Alert, cooperative, in no acute distress   Lungs:    Left basilar crackles/dullness,respirations regular, even and      unlabored    Heart:    Regular and normal rate, normal S1 and S2, grade 1/6 systolic murmur, no gallop, no rub, no click   Abdomen:  Extremities:   Soft, non-tender, bowel sounds audible x4    No edema, normal range of motion   Pulses:   Pulses palpable and equal bilaterally      Results Review:   Results from last 7 days   Lab Units 12/26/22  0453 12/25/22  2054 12/25/22  1244   SODIUM mmol/L 123* 123* 124*   POTASSIUM mmol/L 4.3 3.7 3.7   CHLORIDE mmol/L 90* 90* 89*   CO2 mmol/L 26.0 25.0 26.0   BUN mg/dL 11 12 13   CREATININE mg/dL 0.56* 0.67* 0.64*   GLUCOSE mg/dL 119* 128* 148*   CALCIUM mg/dL 8.3* 8.4* 8.6     Results from last 7 days   Lab Units 12/26/22  0653 12/25/22  0505 12/24/22  0329   WBC 10*3/mm3 11.59* 14.01* 12.22*   HEMOGLOBIN g/dL 11.9* 11.4* 11.9*   HEMATOCRIT % 34.8*  33.3* 34.7*   PLATELETS 10*3/mm3 180 182 181     Results from last 7 days   Lab Units 12/19/22  0836   HEMOGLOBIN A1C % 6.10*     · CHEST CTA (12/25/2022):    Findings:    The main pulmonary artery normal in caliber. No evidence of pulmonary embolism is identified bilaterally. Right IJ catheter in satisfactory position. There is a right sided chest tube tract with a small amount of residual pneumomediastinum. There is also   a small amount of pneumomediastinum within the pericardial fat pad anteriorly. The esophagus is normal. There are scattered mildly prominent lymph nodes which are favored to be reactive.     There is a small right base pneumothorax. There is partial consolidation of the right lower lobe with extensive right lower lobe volume loss, likely atelectasis, superimposed pneumonia is not excluded. There is complete left lower lobe collapse with   volume loss, likely atelectasis. There are filling defects within the left lower lobe bronchi which may reflect mucous plugging. There are scattered other areas of opacity within the aerated lungs, favored to represent atelectasis. There is a small left   pleural effusion.     Limited views of the upper abdomen demonstrate mild subcutaneous emphysema likely from recent chest tubes. No aggressive appearing lytic or sclerotic bone lesions are identified.     IMPRESSIONS:    1. No evidence of pulmonary embolism is identified.  2. Partial right lower lobe, and complete left lower lobe consolidation with volume loss. These findings are likely reflective of atelectasis, superimposed pneumonia is not excluded.  3. Very small right pneumothorax  4. Very small left pleural effusion  5. Small amount of pneumomediastinum and subcutaneous emphysema, not unexpected given recent surgery.    · CXR: Moderate cardiomegaly with left ventricular prominence and atelectasis/effusion/infiltrate left base without overt pulmonary vascular congestion or marked increase in pneumothorax;  formal official report pending.    · URINE NA: 28    · URINE OSM: 264    · URINE CR: 30    · ACCU-CHEKS: 128 - 125 - 119 - 139 MG/DL.    Medication Review: REVIEWED; DRIP = IV nitroglycerin 30 mcg/minute continuous infusion.    Assessment & Plan     Excellent diuresis with stable GFR.  Suspect he will need a dose of tolvaptan 15 mg once for significant hyponatremia.  Will increase Entresto to 49/51 BID and hopefully allow IV nitroglycerin to be weaned off.  Acceptable progress.      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 pectoris ([facility])    Essential hypertension    DM (diabetes mellitus), type 2 ([facility])    NSVT (nonsustained ventricular tachycardia)    Coronary artery disease of native artery of native heart with stable angina pectoris ([facility])    12/26/22  08:15 EST

## 2022-12-26 NOTE — THERAPY TREATMENT NOTE
Patient Name: Stone Villeda  : 1975    MRN: [id number]                              Today's Date: 2022       Admit Date: 2022    Visit Dx:     ICD-10-CM ICD-9-CM   1. 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 pectoris (HCC)  I25.119 414.01     413.9     Patient Active Problem List   Diagnosis   • Other chest pain   • Essential hypertension   • Hyperlipidemia   • Smoking   • Family history of early CAD   • Shortness of breath   • Palpitations   • DM (diabetes mellitus), type 2 (HCC)   • PVC (premature ventricular contraction)   • NSVT (nonsustained ventricular tachycardia)   • Grade I diastolic dysfunction   • Coronary artery disease of native artery of native heart with stable angina pectoris (HCC)   • Dizziness   • Abnormal stress test   • Peripheral edema   • Ischemic cardiomyopathy   • 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 pectoris (HCC)   • Hyponatremia   • Acute respiratory failure with hypoxia (HCC)     Past Medical History:   Diagnosis Date   • Coronary artery disease    • COVID-19 02/15/2022   • Diabetes mellitus (HCC)    • Hyperlipidemia    • Hypertension    • Sleep apnea     NO CPAP     Past Surgical History:   Procedure Laterality Date   • CARDIAC CATHETERIZATION      X2; NO INTERVENTION   • CHOLECYSTECTOMY     • HAND SURGERY Right    • WISDOM TOOTH EXTRACTION        General Information     Row Name 22 1437          Physical Therapy Time and Intention    Document Type therapy note (daily note)  -AE     Mode of Treatment physical therapy  -AE     Row Name 22 1437          General Information    Patient Profile Reviewed yes  -AE     Existing Precautions/Restrictions cardiac;oxygen therapy device and L/min;sternal  -AE     Barriers to Rehab none identified  -AE     Row Name 22 1437          Cognition    Orientation Status (Cognition) oriented x 4  -AE     Row Name 22 1432           Safety Issues, Functional Mobility    Safety Issues Affecting Function (Mobility) awareness of need for assistance;insight into deficits/self-awareness;safety precaution awareness;safety precautions follow-through/compliance  -AE     Impairments Affecting Function (Mobility) endurance/activity tolerance;shortness of breath  -AE           User Key  (r) = Recorded By, (t) = Taken By, (c) = Cosigned By    Initials Name Provider Type    AE Rudolph Morales, PT Physical Therapist               Mobility     Row Name 12/26/22 1438          Bed Mobility    Comment, (Bed Mobility) Pt received and left UI.  -AE     Row Name 12/26/22 1438          Transfers    Comment, (Transfers) VCs for hand placement and sequencing to maintain sternal precautions. Pt requires increased cues to improve safety awareness.  -AE     Row Name 12/26/22 1438          Sit-Stand Transfer    Sit-Stand [place] (Transfers) contact guard;1 person assist  -AE     Row Name 12/26/22 1438          Gait/Stairs (Locomotion)    [place] Level (Gait) contact guard;1 person assist  -AE     Distance in Feet (Gait) 440  -AE     Deviations/Abnormal Patterns (Gait) bilateral deviations;base of support, wide  -AE     Bilateral Gait Deviations forward flexed posture  -AE     Comment, (Gait/Stairs) Pt demo step through gait pattern with slight forward flexed posture and decreased safety awareness. Pt has increased gait speed and requires cues for line management to reduce risk of pulling IV.  -AE           User Key  (r) = Recorded By, (t) = Taken By, (c) = Cosigned By    Initials Name Provider Type    AE Rudolph Morales, PT Physical Therapist               Obj/Interventions     Row Name 12/26/22 1441          Balance    Balance Assessment sitting static balance;sitting dynamic balance;sit to stand dynamic balance;standing static balance;standing dynamic balance  -AE     Static Sitting Balance independent  -AE     Dynamic Sitting Balance independent  -AE      Position, Sitting Balance unsupported;sitting in chair  -AE     Sit to Stand Dynamic Balance contact guard  -AE     Static Standing Balance contact guard  -AE     Dynamic Standing Balance contact guard  -AE     Position/Device Used, Standing Balance supported  -AE           User Key  (r) = Recorded By, (t) = Taken By, (c) = Cosigned By    Initials Name Provider Type    AE Rudolph Morales, PT Physical Therapist               Goals/Plan    No documentation.                Clinical Impression     Row Name 12/26/22 1442          Pain    Pretreatment Pain Rating 0/10 - no pain  -AE     Posttreatment Pain Rating 0/10 - no pain  -AE     Row Name 12/26/22 1442          Plan of Care Review    Plan of Care Reviewed With patient  -AE     Progress improving  -AE     Outcome Evaluation Pt improved ambulation distance this session to 440ft with CGA and BUE support on tele monitor with 4L O2 NC. Pt continues to demo increased gait speed with decreased safety awareness and requires cues to improve. Continue to progress per pt tolerance.  -AE     Row Name 12/26/22 1442          Vital Signs    Pre Systolic BP Rehab 149  -AE     Pre Treatment Diastolic BP 73  -AE     Post Systolic BP Rehab 153  -AE     Post Treatment Diastolic BP 80  -AE     Pretreatment Heart Rate (beats/min) 85  -AE     Posttreatment Heart Rate (beats/min) 86  -AE     Pre SpO2 (%) 93  -AE     O2 Delivery Pre Treatment nasal cannula  -AE     Intra SpO2 (%) 94  -AE     O2 Delivery Intra Treatment nasal cannula  -AE     Post SpO2 (%) 91  -AE     O2 Delivery Post Treatment nasal cannula  -AE     Pre Patient Position Sitting  -AE     Intra Patient Position Standing  -AE     Post Patient Position Sitting  -AE     Row Name 12/26/22 1442          Positioning and Restraints    Pre-Treatment Position sitting in chair/recliner  -AE     Post Treatment Position chair  -AE     In Chair notified nsg;reclined;call light within reach;encouraged to call for assist;waffle  cushion;legs elevated  -AE           User Key  (r) = Recorded By, (t) = Taken By, (c) = Cosigned By    Initials Name Provider Type    Rudolph Nj PT Physical Therapist               Outcome Measures     Row Name 12/26/22 1444 12/26/22 0800       How much help from another person do you currently need...    Turning from your back to your side while in flat bed without using bedrails? 3  -AE 3  -KR    Moving from lying on back to sitting on the side of a flat bed without bedrails? 3  -AE 3  -KR    Moving to and from a bed to a chair (including a wheelchair)? 3  -AE 3  -KR    Standing up from a chair using your arms (e.g., wheelchair, bedside chair)? 3  -AE 3  -KR    Climbing 3-5 steps with a railing? 3  -AE 3  -KR    To walk in hospital room? 3  -AE 3  -KR    AM-PAC 6 Clicks Score (PT) 18  -AE 18  -KR    Highest level of mobility 6 --> Walked 10 steps or more  -AE 6 --> Walked 10 steps or more  -KR    Row Name 12/26/22 1444          Functional Assessment    Outcome Measure Options AM-PAC 6 Clicks Basic Mobility (PT)  -AE           User Key  (r) = Recorded By, (t) = Taken By, (c) = Cosigned By    Initials Name Provider Type    Jaqueline Armenta, RN Registered Nurse    Rudolph Nj, PT Physical Therapist                             Physical Therapy Education     Title: PT OT SLP Therapies (In Progress)     Topic: Physical Therapy (In Progress)     Point: Mobility training (Done)     Learning Progress Summary           Patient Acceptance, E, VU by AE at 12/26/2022 0910    Acceptance, E, VU,NR by AGGIE at 12/24/2022 1146                   Point: Home exercise program (Not Started)     Learner Progress:  Not documented in this visit.          Point: Body mechanics (Done)     Learning Progress Summary           Patient Acceptance, E, VU by AE at 12/26/2022 0910    Acceptance, E, VU,NR by AGGIE at 12/24/2022 1146                   Point: Precautions (Done)     Learning Progress Summary           Patient  Acceptance, E, VU by AE at 12/26/2022 0910    Acceptance, E, VU,NR by AGGIE at 12/24/2022 1146                               User Key     Initials Effective Dates Name Provider Type Discipline    AGGIE 06/16/21 -  Danita Villanueva PT Physical Therapist PT    AE 09/21/21 -  Rudolph Morales PT Physical Therapist PT              PT Recommendation and Plan     Plan of Care Reviewed With: patient  Progress: improving  Outcome Evaluation: Pt improved ambulation distance this session to 440ft with CGA and BUE support on tele monitor with 4L O2 NC. Pt continues to demo increased gait speed with decreased safety awareness and requires cues to improve. Continue to progress per pt tolerance.     Time Calculation:    PT Charges     Row Name 12/26/22 1445             Time Calculation    Start Time 0910  -AE      PT Received On 12/26/22  -AE      PT Goal Re-Cert Due Date 01/03/23  -AE         Time Calculation- PT    Total Timed Code Minutes- PT 25 minute(s)  -AE         Timed Charges    72535 - PT Therapeutic Activity Minutes 25  -AE         Total Minutes    Timed Charges Total Minutes 25  -AE       Total Minutes 25  -AE            User Key  (r) = Recorded By, (t) = Taken By, (c) = Cosigned By    Initials Name Provider Type    AE Rudolph Morales PT Physical Therapist              Therapy Charges for Today     Code Description Service Date Service Provider Modifiers Qty    [id number] HC PT THERAPEUTIC ACT EA 15 MIN 12/26/2022 Rudolph Morales PT GP 2          PT G-Codes  Outcome Measure Options: AM-PAC 6 Clicks Basic Mobility (PT)  AM-PAC 6 Clicks Score (PT): 18  PT Discharge Summary  Anticipated Discharge Disposition (PT): home    Rudolph Morales PT  12/26/2022

## 2022-12-26 NOTE — PROGRESS NOTES
"                    Clinical Nutrition       Patient Name: Stone Villeda  YOB: 1975  MRN: [id number]  Date of Encounter: 12/26/22 10:35 EST  Admission date: 12/23/2022      Reason for Visit   MDR, Reduced oral intake       EMR Reviewed     EMR Reviewed: yes     Admission Diagnosis:  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 pectoris (HCC) [I25.119]  Type 2 diabetes mellitus with other circulatory complication, with long-term current use of insulin (HCC) [E11.59, Z79.4]    Problem List:    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 pectoris (HCC)    Essential hypertension    DM (diabetes mellitus), type 2 (HCC)    NSVT (nonsustained ventricular tachycardia)    Coronary artery disease of native artery of native heart with stable angina pectoris ([facility])    Hyponatremia    Anthropometric      Flowsheet Rows    Flowsheet Row First Filed Value   Admission Height 193 cm (76\") Documented at 12/23/2022 0612   Admission Weight 117 kg (258 lb) Documented at 12/23/2022 0612          Height: 193 cm (75.98\")  Last Filed Weight: Weight: 117 kg (258 lb) (12/23/22 0612)  Weight Method: Stated  BMI: BMI (Calculated): 31.4  BMI classification: Obese Class I: 30-34.9kg/m2   IBW: 202 lbs       Reported/Observed/Food/Nutrition Related - Comments   Patient sitting up in bedside chair. Reports appetite doing better today compared to previous few days, but still not at baseline. Ate breakfast this morning. RN obtained lunch/dinner meal orders today. RD obtained tomorrow morning's breakfast order. Patient agrees to try vanilla ONS. RD informed pt that RD will send an 8 ounce ONS given the 1.5 L fluid restriction that was added this morning (d/t hyponatremia).       Current Nutrition Prescription     Diet: Cardiac Diets, Diabetic Diets, Regular/House Diet, Fluid Restriction (240 mL/tray) Diets; Healthy Heart (2-3 Na+); Consistent Carbohydrate; " 1500 mL/day; Texture: Regular Texture (IDDSI 7); Fluid Consistency: Thin (IDDSI 0)    Average Intake from [place]% x 3 meals    Nutrition Diagnosis     Problem Inadequate oral intake   Etiology Clinical status/diet order, decreased appetite   Signs/Symptoms PO intake: 33% x 3 meals   Status:    Actions     Follow treatment progress, Care plan reviewed, Interview for preferences, Encourage intake, Supplement provided     -Communicate food preferences to diet office.  -Ordered vanilla Boost Glucose Control 1x daily.    Monitor Per Protocol      Giselle Whittaker RD  Time Spent: 20 min

## 2022-12-26 NOTE — PROGRESS NOTES
"INTENSIVIST   PROGRESS NOTE     Hospital:  LOS: 3 days     Chief Complaint: Postoperative management    Subjective   S     Interval History: Overnight patient with ongoing, increased oxygen requirements.  He was placed on 80% nonrebreather around 9 PM which was weaned over the course of the night.  This morning patient looks and feels subjectively better compared to yesterday.  He has successfully weaned to 4L supplemental nasal cannula with appropriate oxygen saturations.  Even with ambulation this morning-- he denies significant dyspnea, chest pain, syncope, presyncope, hemoptysis, nausea, vomiting, diarrhea.    The patient's relevant past medical, surgical and social history were reviewed and updated in Epic as appropriate.      ROS: Fourteen point review of system performed and negative except for that mentioned in HPI.     Objective   O     Intake/Ouptut 24 hrs (7:00AM - 6:59 AM)  Intake & Output (last 3 days)       12/23 0701  12/24 0700 12/24 0701 12/25 0700 12/25 0701 12/26 0700 12/26 0701  12/27 0700    P.O. 1000 2200 360 240    I.V. (mL/kg) 2522 (21.6) 1869.5 (16) 245 (2.1)     Blood 771       IV Piggyback 300 200      Total Intake(mL/kg) 4593 (39.3) 4269.5 (36.5) 605 (5.2) 240 (2.1)    Urine (mL/kg/hr) 2520 (0.9) 1050 (0.4) 4200 (1.5)     Emesis/NG output 50       Chest Tube 560 310 40     Total Output 3130 1360 4240     Net +1463 +2909.5 -3635 +240                Medications (drips):  dexmedetomidine, Last Rate: 0.4 mcg/kg/hr (12/23/22 1700)  niCARdipine, Last Rate: Stopped (12/23/22 1230)  nitroglycerin, Last Rate: 30 mcg/min (12/26/22 0100)  norepinephrine, Last Rate: Stopped (12/24/22 0000)  phenylephrine    Respiratory Support: Supplemental oxygen via nasal cannula (4L)    Physical Examination:  Vital Signs: Blood pressure 129/79, pulse 78, temperature 98 °F (36.7 °C), temperature source Oral, resp. rate 22, height 193 cm (75.98\"), weight 117 kg (258 lb), SpO2 94 %.    General: The patient appears " in no acute distress. Alert, cooperative and interactive.   Neck: Trachea midline, No masses.   Chest: Diminished breath sounds at bilateral bases.  No expiratory wheeze or rhonchi.  Cardiac: Normal rate, S1S2 auscultated. No murmurs, rubs or gallops.   Extremities: No lower extremity edema. No clubbing or cyanosis.   Neuro: Motor power grossly intact bilaterally. Sensation intact. Speech fluid and fluent. Thought process coherent.   Psych: Alert and oriented x3. Mood stable.     Lines, Drains & Airways     Active LDAs     Name Placement date Placement time Site Days    CVC Double Lumen 12/23/22 Right Internal jugular 12/23/22  0720  created via procedure documentation  Internal jugular  1    Peripheral IV 12/23/22 0606 Left;Medial Wrist 12/23/22  0606  Wrist  1    Urethral Catheter Temperature probe;Silicone 16 Fr. 12/23/22  --  -- 1    Y Chest Tube 1 and 2 1 Right Mediastinal 32 Fr. 2 Right Mediastinal 32 Fr. 12/23/22  --  -- 1    Y Chest Tube 3 and 4 3 Right Mediastinal 32 Fr. 4 Right Mediastinal 32 Fr. 12/23/22  --  -- 1    Arterial Line 12/23/22 Right Radial 12/23/22  0630  created via procedure documentation  Radial  1              Results from last 7 days   Lab Units 12/26/22  0653 12/25/22  0505 12/24/22  0329   WBC 10*3/mm3 11.59* 14.01* 12.22*   HEMOGLOBIN g/dL 11.9* 11.4* 11.9*   MCV fL 86.1 85.4 88.1   PLATELETS 10*3/mm3 180 182 181     Results from last 7 days   Lab Units 12/26/22  0453 12/25/22  2054 12/25/22  1244 12/25/22  0505 12/24/22  0329 12/23/22  1518 12/23/22  1122   SODIUM mmol/L 123* 123* 124*   < > 133* 138 136   POTASSIUM mmol/L 4.3 3.7 3.7   < > 4.2 3.9 4.8   CO2 mmol/L 26.0 25.0 26.0   < > 22.0 23.0 24.0   CREATININE mg/dL 0.56* 0.67* 0.64*   < > 0.84 0.77 0.79   GLUCOSE mg/dL 119* 128* 148*   < > 156* 170* 179*   MAGNESIUM mg/dL  --   --   --   --  2.4 2.5 1.9   PHOSPHORUS mg/dL  --   --   --   --  3.4 3.7 4.3    < > = values in this interval not displayed.     Estimated Creatinine  Clearance: 230.6 mL/min (A) (by C-G formula based on SCr of 0.56 mg/dL (L)).        Results from last 7 days   Lab Units 12/25/22  1743 12/23/22  1631 12/23/22  1121   PH, ARTERIAL pH units 7.496* 7.332* 7.330*   PCO2, ARTERIAL mm Hg 33.1* 45.4* 47.7*   PO2 ART mm Hg 51.8* 68.6* 121.0*   FIO2 % 100 40 100     CXR (12/26/2022):  Radiology impression:  Median sternotomy wires appear intact. A right internal jugular catheter  has its tip at the mid SVC. There are bibasilar opacities. The heart and  mediastinal contours appear stable. There is a trace right apical  pneumothorax. The pulmonary vasculature appears normal.     IMPRESSION:  1.  Trace right apical pneumothorax.  2.  Bibasilar opacities, which could reflect atelectasis or pneumonia.    Results: Reviewed.  I reviewed the patient's new laboratory and imaging results.  I independently reviewed the patient's new images.    Medications: Reviewed.    Assessment & Plan   A / P     Active Hospital Problems:  Active Hospital Problems    Diagnosis  POA   • **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 pectoris (HCC) [I25.119]  Yes   • Hyponatremia [E87.1]  No   • Acute respiratory failure with hypoxia (HCC) [J96.01]  No   • Coronary artery disease of native artery of native heart with stable angina pectoris (HCC) [I25.118]  Yes   • NSVT (nonsustained ventricular tachycardia) [I47.29]  Yes   • DM (diabetes mellitus), type 2 (HCC) [E11.9]  Yes   • Essential hypertension [I10]  Yes      Resolved Hospital Problems   No resolved problems to display.     Patient Ronal is a 47M with a history of HTN, HLD, T2DM, VT, ischemic cardiomyopathy, CAD and ongoing tobacco use who underwent CABG x 2 by Dr. Walker (12/23). He is admitted to the ICU for postoperative management. While he did not have any intra-operative arrhythmias, patient placed on Amiodarone infusion for prophylaxis secondary to his history of VT. Hospital course has been  complicated by continued hypoxic respiratory failure requiring high flow nasal cannula and hyponatremia.     Preoperative spirometry reviewed and negative for airway obstruction.     - Lab evaluation (12/25) with acute hyponatremia, Na 133 to 123.  Diuresis regimen was modified yesterday and patient had very significant urine output over the last 24 hours (>4L).  In light of this, sodium was monitored but not aggressively corrected (as overcorrection could have devastating effects and increased UOP was thought to represent autocorrection). Despite adequate diuresis sodium value remained low. Increased slightly to124 but this AM again 123.  He remains asymptomatic, mentating at baseline. Today, will initiate replacement with a one-time bolus of hypertonic saline (3%, 50 mL) and start salt tabs.  Continue diuresis and fluid restrict to 1500 mL. Will need to monitor sodiums every 6 hours. If conservative management has no effect will consider vasopressin receptor antagonist. There is no concomitant hyperkalemia.   -Patient extubated.  Hospital course complicated by ongoing acute hypoxic respiratory failure.  Overall improved in the last 24 hours as patient is currently stable on 4 L nasal cannula  -Continue Amiodarone   -Insulin drip for diabetes management; Transitioned (12/24)  -Chest tube management per CTS  -Pain control/supportive care  -Needs smoking cessation  -Reinitiate Plavix (12/24)    High level of risk due to:  severe exacerbation of chronic illness and illness with threat to life or bodily function.    Plan of care and goals reviewed during interdisciplinary rounds.  I discussed the patient's findings and my recommendations with patient    -- Ko Garcia MD  Pulmonary/Critical Care

## 2022-12-26 NOTE — PLAN OF CARE
Goal Outcome Evaluation:  Plan of Care Reviewed With: patient        Progress: improving  Outcome Evaluation: Pt improved ambulation distance this session to 440ft with CGA and BUE support on tele monitor with 4L O2 NC. Pt continues to demo increased gait speed with decreased safety awareness and requires cues to improve. Continue to progress per pt tolerance.

## 2022-12-27 ENCOUNTER — APPOINTMENT (OUTPATIENT)
Dept: GENERAL RADIOLOGY | Facility: HOSPITAL | Age: 47
DRG: 235 | End: 2022-12-27
Payer: COMMERCIAL

## 2022-12-27 LAB
ACT BLD: 125 SECONDS (ref 82–152)
ACT BLD: 131 SECONDS (ref 82–152)
ACT BLD: 396 SECONDS (ref 82–152)
ACT BLD: 528 SECONDS (ref 82–152)
ACT BLD: 570 SECONDS (ref 82–152)
ANION GAP SERPL CALCULATED.3IONS-SCNC: 11 MMOL/L (ref 5–15)
BASE EXCESS BLDA CALC-SCNC: -1 MMOL/L (ref -5–5)
BASE EXCESS BLDA CALC-SCNC: -1 MMOL/L (ref -5–5)
BASE EXCESS BLDA CALC-SCNC: -3 MMOL/L (ref -5–5)
BASE EXCESS BLDA CALC-SCNC: -3 MMOL/L (ref -5–5)
BASE EXCESS BLDA CALC-SCNC: -5 MMOL/L (ref -5–5)
BASE EXCESS BLDA CALC-SCNC: 0 MMOL/L (ref -5–5)
BH BB BLOOD EXPIRATION DATE: NORMAL
BH BB BLOOD EXPIRATION DATE: NORMAL
BH BB BLOOD TYPE BARCODE: 5100
BH BB BLOOD TYPE BARCODE: 5100
BH BB DISPENSE STATUS: NORMAL
BH BB DISPENSE STATUS: NORMAL
BH BB PRODUCT CODE: NORMAL
BH BB PRODUCT CODE: NORMAL
BH BB UNIT NUMBER: NORMAL
BH BB UNIT NUMBER: NORMAL
BUN SERPL-MCNC: 13 MG/DL (ref 6–20)
BUN/CREAT SERPL: 23.2 (ref 7–25)
CA-I BLDA-SCNC: 0.99 MMOL/L (ref 1.2–1.32)
CA-I BLDA-SCNC: 1.02 MMOL/L (ref 1.2–1.32)
CA-I BLDA-SCNC: 1.14 MMOL/L (ref 1.2–1.32)
CA-I BLDA-SCNC: 1.21 MMOL/L (ref 1.2–1.32)
CA-I BLDA-SCNC: 1.36 MMOL/L (ref 1.2–1.32)
CA-I BLDA-SCNC: 1.63 MMOL/L (ref 1.2–1.32)
CALCIUM SPEC-SCNC: 8.4 MG/DL (ref 8.6–10.5)
CHLORIDE SERPL-SCNC: 92 MMOL/L (ref 98–107)
CO2 BLDA-SCNC: 23 MMOL/L (ref 24–29)
CO2 BLDA-SCNC: 24 MMOL/L (ref 24–29)
CO2 BLDA-SCNC: 25 MMOL/L (ref 24–29)
CO2 BLDA-SCNC: 25 MMOL/L (ref 24–29)
CO2 BLDA-SCNC: 27 MMOL/L (ref 24–29)
CO2 BLDA-SCNC: 28 MMOL/L (ref 24–29)
CO2 SERPL-SCNC: 24 MMOL/L (ref 22–29)
CREAT SERPL-MCNC: 0.56 MG/DL (ref 0.76–1.27)
CROSSMATCH INTERPRETATION: NORMAL
CROSSMATCH INTERPRETATION: NORMAL
EGFRCR SERPLBLD CKD-EPI 2021: 123.1 ML/MIN/1.73
GLUCOSE BLDC GLUCOMTR-MCNC: 108 MG/DL (ref 70–130)
GLUCOSE BLDC GLUCOMTR-MCNC: 109 MG/DL (ref 70–130)
GLUCOSE BLDC GLUCOMTR-MCNC: 121 MG/DL (ref 70–130)
GLUCOSE BLDC GLUCOMTR-MCNC: 131 MG/DL (ref 70–130)
GLUCOSE BLDC GLUCOMTR-MCNC: 131 MG/DL (ref 70–130)
GLUCOSE BLDC GLUCOMTR-MCNC: 132 MG/DL (ref 70–130)
GLUCOSE BLDC GLUCOMTR-MCNC: 138 MG/DL (ref 70–130)
GLUCOSE BLDC GLUCOMTR-MCNC: 139 MG/DL (ref 70–130)
GLUCOSE BLDC GLUCOMTR-MCNC: 150 MG/DL (ref 70–130)
GLUCOSE BLDC GLUCOMTR-MCNC: 156 MG/DL (ref 70–130)
GLUCOSE SERPL-MCNC: 128 MG/DL (ref 65–99)
HCO3 BLDA-SCNC: 22 MMOL/L (ref 22–26)
HCO3 BLDA-SCNC: 23.1 MMOL/L (ref 22–26)
HCO3 BLDA-SCNC: 23.4 MMOL/L (ref 22–26)
HCO3 BLDA-SCNC: 23.5 MMOL/L (ref 22–26)
HCO3 BLDA-SCNC: 25.2 MMOL/L (ref 22–26)
HCO3 BLDA-SCNC: 26.2 MMOL/L (ref 22–26)
HCT VFR BLDA CALC: 33 % (ref 38–51)
HCT VFR BLDA CALC: 34 % (ref 38–51)
HCT VFR BLDA CALC: 34 % (ref 38–51)
HCT VFR BLDA CALC: 35 % (ref 38–51)
HCT VFR BLDA CALC: 41 % (ref 38–51)
HCT VFR BLDA CALC: 45 % (ref 38–51)
HGB BLDA-MCNC: 11.2 G/DL (ref 12–17)
HGB BLDA-MCNC: 11.6 G/DL (ref 12–17)
HGB BLDA-MCNC: 11.6 G/DL (ref 12–17)
HGB BLDA-MCNC: 11.9 G/DL (ref 12–17)
HGB BLDA-MCNC: 13.9 G/DL (ref 12–17)
HGB BLDA-MCNC: 15.3 G/DL (ref 12–17)
PA ADP PRP-ACNC: 213 PRU
PCO2 BLDA: 36.3 MM HG (ref 35–45)
PCO2 BLDA: 49 MM HG (ref 35–45)
PCO2 BLDA: 49.2 MM HG (ref 35–45)
PCO2 BLDA: 50.5 MM HG (ref 35–45)
PCO2 BLDA: 50.6 MM HG (ref 35–45)
PCO2 BLDA: 52.9 MM HG (ref 35–45)
PH BLDA: 7.26 PH UNITS (ref 7.35–7.6)
PH BLDA: 7.28 PH UNITS (ref 7.35–7.6)
PH BLDA: 7.29 PH UNITS (ref 7.35–7.6)
PH BLDA: 7.3 PH UNITS (ref 7.35–7.6)
PH BLDA: 7.31 PH UNITS (ref 7.35–7.6)
PH BLDA: 7.41 PH UNITS (ref 7.35–7.6)
PO2 BLDA: 156 MMHG (ref 80–105)
PO2 BLDA: 245 MMHG (ref 80–105)
PO2 BLDA: 353 MMHG (ref 80–105)
PO2 BLDA: 49 MMHG (ref 80–105)
PO2 BLDA: 53 MMHG (ref 80–105)
PO2 BLDA: 64 MMHG (ref 80–105)
POTASSIUM BLDA-SCNC: 3.8 MMOL/L (ref 3.5–4.9)
POTASSIUM BLDA-SCNC: 4.1 MMOL/L (ref 3.5–4.9)
POTASSIUM BLDA-SCNC: 4.4 MMOL/L (ref 3.5–4.9)
POTASSIUM BLDA-SCNC: 4.9 MMOL/L (ref 3.5–4.9)
POTASSIUM BLDA-SCNC: 5.8 MMOL/L (ref 3.5–4.9)
POTASSIUM BLDA-SCNC: 6.6 MMOL/L (ref 3.5–4.9)
POTASSIUM SERPL-SCNC: 4.3 MMOL/L (ref 3.5–5.2)
SAO2 % BLDA: 100 % (ref 95–98)
SAO2 % BLDA: 100 % (ref 95–98)
SAO2 % BLDA: 78 % (ref 95–98)
SAO2 % BLDA: 83 % (ref 95–98)
SAO2 % BLDA: 88 % (ref 95–98)
SAO2 % BLDA: 99 % (ref 95–98)
SODIUM BLD-SCNC: 133 MMOL/L (ref 138–146)
SODIUM BLD-SCNC: 134 MMOL/L (ref 138–146)
SODIUM BLD-SCNC: 135 MMOL/L (ref 138–146)
SODIUM BLD-SCNC: 135 MMOL/L (ref 138–146)
SODIUM BLD-SCNC: 136 MMOL/L (ref 138–146)
SODIUM BLD-SCNC: 137 MMOL/L (ref 138–146)
SODIUM SERPL-SCNC: 122 MMOL/L (ref 136–145)
SODIUM SERPL-SCNC: 126 MMOL/L (ref 136–145)
SODIUM SERPL-SCNC: 127 MMOL/L (ref 136–145)
SODIUM SERPL-SCNC: 130 MMOL/L (ref 136–145)
UNIT  ABO: NORMAL
UNIT  ABO: NORMAL
UNIT  RH: NORMAL
UNIT  RH: NORMAL

## 2022-12-27 PROCEDURE — 99232 SBSQ HOSP IP/OBS MODERATE 35: CPT | Performed by: INTERNAL MEDICINE

## 2022-12-27 PROCEDURE — 84295 ASSAY OF SERUM SODIUM: CPT | Performed by: INTERNAL MEDICINE

## 2022-12-27 PROCEDURE — 25010000002 ONDANSETRON PER 1 MG: Performed by: STUDENT IN AN ORGANIZED HEALTH CARE EDUCATION/TRAINING PROGRAM

## 2022-12-27 PROCEDURE — 63710000001 INSULIN LISPRO (HUMAN) PER 5 UNITS: Performed by: INTERNAL MEDICINE

## 2022-12-27 PROCEDURE — 99233 SBSQ HOSP IP/OBS HIGH 50: CPT | Performed by: INTERNAL MEDICINE

## 2022-12-27 PROCEDURE — 80048 BASIC METABOLIC PNL TOTAL CA: CPT | Performed by: STUDENT IN AN ORGANIZED HEALTH CARE EDUCATION/TRAINING PROGRAM

## 2022-12-27 PROCEDURE — 82962 GLUCOSE BLOOD TEST: CPT

## 2022-12-27 PROCEDURE — 71045 X-RAY EXAM CHEST 1 VIEW: CPT

## 2022-12-27 PROCEDURE — 97110 THERAPEUTIC EXERCISES: CPT

## 2022-12-27 PROCEDURE — 99024 POSTOP FOLLOW-UP VISIT: CPT | Performed by: THORACIC SURGERY (CARDIOTHORACIC VASCULAR SURGERY)

## 2022-12-27 PROCEDURE — 25010000002 HEPARIN (PORCINE) PER 1000 UNITS: Performed by: INTERNAL MEDICINE

## 2022-12-27 PROCEDURE — 85576 BLOOD PLATELET AGGREGATION: CPT | Performed by: PHYSICIAN ASSISTANT

## 2022-12-27 PROCEDURE — 97530 THERAPEUTIC ACTIVITIES: CPT

## 2022-12-27 PROCEDURE — 84295 ASSAY OF SERUM SODIUM: CPT | Performed by: THORACIC SURGERY (CARDIOTHORACIC VASCULAR SURGERY)

## 2022-12-27 RX ORDER — PROMETHAZINE HYDROCHLORIDE 12.5 MG/1
12.5 TABLET ORAL EVERY 6 HOURS PRN
Status: DISPENSED | OUTPATIENT
Start: 2022-12-27 | End: 2022-12-29

## 2022-12-27 RX ORDER — BUMETANIDE 0.25 MG/ML
2 INJECTION INTRAMUSCULAR; INTRAVENOUS ONCE
Status: COMPLETED | OUTPATIENT
Start: 2022-12-27 | End: 2022-12-27

## 2022-12-27 RX ORDER — HEPARIN SODIUM 5000 [USP'U]/ML
5000 INJECTION, SOLUTION INTRAVENOUS; SUBCUTANEOUS EVERY 8 HOURS SCHEDULED
Status: DISCONTINUED | OUTPATIENT
Start: 2022-12-27 | End: 2022-12-30 | Stop reason: HOSPADM

## 2022-12-27 RX ORDER — SODIUM CHLORIDE 1000 MG
2 TABLET, SOLUBLE MISCELLANEOUS
Status: DISCONTINUED | OUTPATIENT
Start: 2022-12-27 | End: 2022-12-30 | Stop reason: HOSPADM

## 2022-12-27 RX ORDER — SODIUM CHLORIDE 3 G/100ML
50 INJECTION, SOLUTION INTRAVENOUS ONCE
Status: COMPLETED | OUTPATIENT
Start: 2022-12-27 | End: 2022-12-27

## 2022-12-27 RX ADMIN — INSULIN LISPRO 2 UNITS: 100 INJECTION, SOLUTION INTRAVENOUS; SUBCUTANEOUS at 12:30

## 2022-12-27 RX ADMIN — SODIUM CHLORIDE 1 G: 1 TABLET ORAL at 12:31

## 2022-12-27 RX ADMIN — METOPROLOL TARTRATE 50 MG: 50 TABLET, FILM COATED ORAL at 21:08

## 2022-12-27 RX ADMIN — ASPIRIN 325 MG: 325 TABLET, COATED ORAL at 08:14

## 2022-12-27 RX ADMIN — SENNOSIDES AND DOCUSATE SODIUM 2 TABLET: 50; 8.6 TABLET ORAL at 21:08

## 2022-12-27 RX ADMIN — SODIUM CHLORIDE 2 G: 1 TABLET ORAL at 18:05

## 2022-12-27 RX ADMIN — HEPARIN SODIUM 5000 UNITS: 5000 INJECTION INTRAVENOUS; SUBCUTANEOUS at 14:20

## 2022-12-27 RX ADMIN — ACETAMINOPHEN 650 MG: 325 TABLET ORAL at 21:08

## 2022-12-27 RX ADMIN — HEPARIN SODIUM 5000 UNITS: 5000 INJECTION INTRAVENOUS; SUBCUTANEOUS at 21:08

## 2022-12-27 RX ADMIN — ONDANSETRON 4 MG: 2 INJECTION INTRAMUSCULAR; INTRAVENOUS at 12:39

## 2022-12-27 RX ADMIN — AMLODIPINE BESYLATE 10 MG: 10 TABLET ORAL at 08:14

## 2022-12-27 RX ADMIN — METOPROLOL TARTRATE 50 MG: 50 TABLET, FILM COATED ORAL at 08:14

## 2022-12-27 RX ADMIN — SODIUM CHLORIDE 50 ML: 3 INJECTION, SOLUTION INTRAVENOUS at 14:22

## 2022-12-27 RX ADMIN — ACETAMINOPHEN 650 MG: 325 TABLET ORAL at 14:19

## 2022-12-27 RX ADMIN — ROSUVASTATIN 20 MG: 20 TABLET, FILM COATED ORAL at 21:07

## 2022-12-27 RX ADMIN — ACETAMINOPHEN 650 MG: 325 TABLET ORAL at 05:23

## 2022-12-27 RX ADMIN — SACUBITRIL AND VALSARTAN 1 TABLET: 49; 51 TABLET, FILM COATED ORAL at 21:07

## 2022-12-27 RX ADMIN — GABAPENTIN 100 MG: 100 CAPSULE ORAL at 14:19

## 2022-12-27 RX ADMIN — SACUBITRIL AND VALSARTAN 1 TABLET: 49; 51 TABLET, FILM COATED ORAL at 08:14

## 2022-12-27 RX ADMIN — ONDANSETRON 4 MG: 2 INJECTION INTRAMUSCULAR; INTRAVENOUS at 03:55

## 2022-12-27 RX ADMIN — ONDANSETRON 4 MG: 2 INJECTION INTRAMUSCULAR; INTRAVENOUS at 21:08

## 2022-12-27 RX ADMIN — SODIUM CHLORIDE 1 G: 1 TABLET ORAL at 08:14

## 2022-12-27 RX ADMIN — CLOPIDOGREL BISULFATE 75 MG: 75 TABLET ORAL at 08:14

## 2022-12-27 RX ADMIN — BUMETANIDE 2 MG: 0.25 INJECTION INTRAMUSCULAR; INTRAVENOUS at 08:14

## 2022-12-27 RX ADMIN — GABAPENTIN 100 MG: 100 CAPSULE ORAL at 21:08

## 2022-12-27 RX ADMIN — GABAPENTIN 100 MG: 100 CAPSULE ORAL at 05:23

## 2022-12-27 NOTE — PROGRESS NOTES
CTS Progress Note       LOS: 4 days   Patient Care Team:  Amita Crook APRN as PCP - General (Nurse Practitioner)    Chief Complaint: 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 pectoris (HCC)    Vital Signs:  Temp:  [97.8 °F (36.6 °C)-98.5 °F (36.9 °C)] 98.5 °F (36.9 °C)  Heart Rate:  [73-98] 82  Resp:  [20-28] 20  BP: (118-156)/(65-98) 156/90    Physical Exam: Now resting on 4 L.       Results:   Results from last 7 days   Lab Units 12/26/22  0653   WBC 10*3/mm3 11.59*   HEMOGLOBIN g/dL 11.9*   HEMATOCRIT % 34.8*   PLATELETS 10*3/mm3 180     Results from last 7 days   Lab Units 12/27/22  0354   SODIUM mmol/L 127*  127*   POTASSIUM mmol/L 4.3   CHLORIDE mmol/L 92*   CO2 mmol/L 24.0   BUN mg/dL 13   CREATININE mg/dL 0.56*   GLUCOSE mg/dL 128*   CALCIUM mg/dL 8.4*           Imaging Results (Last 24 Hours)     Procedure Component Value Units Date/Time    XR Chest 1 View [[id number]] Resulted: 12/27/22 0455     Updated: 12/27/22 0503    XR Chest 1 View [[id number]] Collected: 12/26/22 0925     Updated: 12/26/22 0930    Narrative:      XR CHEST 1 VW-     Date of Exam: 12/26/2022 5:48 AM     Indication: Standing Order - Chest Tube Removal; I25.119-Atherosclerotic  heart disease of native coronary artery with unspecified angina  pectoris.     Comparison Exams: 12/25/2022     Technique: Single AP chest radiograph     FINDINGS:  Median sternotomy wires appear intact. A right internal jugular catheter  has its tip at the mid SVC. There are bibasilar opacities. The heart and  mediastinal contours appear stable. There is a trace right apical  pneumothorax. The pulmonary vasculature appears normal.       Impression:      1.  Trace right apical pneumothorax.  2.  Bibasilar opacities, which could reflect atelectasis or pneumonia.     This report was finalized on 12/26/2022 9:27 AM by Sam Nam MD.             Assessment      Coronary artery disease involving native coronary  artery of native heart with angina pectoris (HCC)    Essential hypertension    DM (diabetes mellitus), type 2 (HCC)    NSVT (nonsustained ventricular tachycardia)    Coronary artery disease of native artery of native heart with stable angina pectoris (HCC)    Hyponatremia    Acute respiratory failure with hypoxia (HCC)    Some complaints of nausea Will leave in the ICU 1 more day but patient is now down to 4 L and I anticipate transfer to telemetry tomorrow    Plan   Bumex 2 mg IV x1 now  P2 Y 12 today    Please note that portions of this note were completed with a voice recognition program. Efforts were made to edit the dictations, but occasionally words are mistranscribed.    Norris Walker MD  12/27/22  06:49 EST

## 2022-12-27 NOTE — PLAN OF CARE
Goal Outcome Evaluation:  Plan of Care Reviewed With: patient        Progress: improving  Outcome Evaluation: Pt ambulated 440 feet CGA with no AD with increased gait speed and required cues to slow down for safety and line management. Pt gave good effort with therex. Reviewed sternal precautions. Will continue to progress IPPT POC as tolerated. Rec stair training next PT session.

## 2022-12-27 NOTE — NURSING NOTE
Pt. Referred for Phase II Cardiac Rehab. Staff discussed benefits of exercise, program protocol, and educational material provided. Teach back verified.  Permission granted from patient for staff to fax referral information to outlying program at this time.  Staff faxed referral info to [facility].

## 2022-12-27 NOTE — PROGRESS NOTES
Stone VALLEJO Ronal  [id number]  1975   LOS: 4 days   Patient Care Team:  Amita Crook APRN as PCP - General (Nurse Practitioner)    Chief Complaint:  IHD / CABG x 2 / T2 DM / HYPONATREMIA / HYPOXEMIA    Subjective   Feeling well this morning, breathing feels better after removal of CTs. Has been up ambulating without issue but not yet this morning     Objective     Vital Sign Min/Max for last 24 hours  Temp  Min: 97.8 °F (36.6 °C)  Max: 98.5 °F (36.9 °C)   BP  Min: 118/65  Max: 156/90   Pulse  Min: 73  Max: 98   Resp  Min: 20  Max: 28   SpO2  Min: 90 %  Max: 98 %               12/23/22  0612   Weight: 117 kg (258 lb)         Intake/Output Summary (Last 24 hours) at 12/27/2022 0856  Last data filed at 12/27/2022 0800  Gross per 24 hour   Intake 1235.5 ml   Output 3641 ml   Net -2405.5 ml       Physical Exam:     General Appearance:    Alert, cooperative, in no acute distress   Lungs:    Nc oxgyen, normal work of breathing, decrease air movement in bases    Heart:    Regular and normal rate, normal S1 and S2, grade 1/6 systolic murmur, no gallop, no rub, no click   Abdomen:  Extremities:   Soft, non-tender, bowel sounds audible x4    No edema, normal range of motion   Pulses:   Pulses palpable and equal bilaterally      Results Review:   Results from last 7 days   Lab Units 12/27/22  0354 12/26/22  2218 12/26/22  1616 12/26/22  1402 12/26/22  0453 12/25/22  2054   SODIUM mmol/L 127*  127* 125* 121*   < > 123* 123*   POTASSIUM mmol/L 4.3  --   --   --  4.3 3.7   CHLORIDE mmol/L 92*  --   --   --  90* 90*   CO2 mmol/L 24.0  --   --   --  26.0 25.0   BUN mg/dL 13  --   --   --  11 12   CREATININE mg/dL 0.56*  --   --   --  0.56* 0.67*   GLUCOSE mg/dL 128*  --   --   --  119* 128*   CALCIUM mg/dL 8.4*  --   --   --  8.3* 8.4*    < > = values in this interval not displayed.     Results from last 7 days   Lab Units 12/26/22  0653 12/25/22  0505 12/24/22  0329   WBC 10*3/mm3 11.59* 14.01* 12.22*   HEMOGLOBIN g/dL  11.9* 11.4* 11.9*   HEMATOCRIT % 34.8* 33.3* 34.7*   PLATELETS 10*3/mm3 180 182 181         · CHEST CTA (12/25/2022):    Findings:    The main pulmonary artery normal in caliber. No evidence of pulmonary embolism is identified bilaterally. Right IJ catheter in satisfactory position. There is a right sided chest tube tract with a small amount of residual pneumomediastinum. There is also a small amount of pneumomediastinum within the pericardial fat pad anteriorly. The esophagus is normal. There are scattered mildly prominent lymph nodes which are favored to be reactive.     There is a small right base pneumothorax. There is partial consolidation of the right lower lobe with extensive right lower lobe volume loss, likely atelectasis, superimposed pneumonia is not excluded. There is complete left lower lobe collapse with volume loss, likely atelectasis. There are filling defects within the left lower lobe bronchi which may reflect mucous plugging. There are scattered other areas of opacity within the aerated lungs, favored to represent atelectasis. There is a small left pleural effusion.     Limited views of the upper abdomen demonstrate mild subcutaneous emphysema likely from recent chest tubes. No aggressive appearing lytic or sclerotic bone lesions are identified.     IMPRESSIONS:    1. No evidence of pulmonary embolism is identified.  2. Partial right lower lobe, and complete left lower lobe consolidation with volume loss. These findings are likely reflective of atelectasis, superimposed pneumonia is not excluded.  3. Very small right pneumothorax  4. Very small left pleural effusion  5. Small amount of pneumomediastinum and subcutaneous emphysema, not unexpected given recent surgery.    · 12/25 - URINE NA: 28, URINE OSM: 264, URINE CR: 30    Assessment & Plan       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 pectoris (HCC)    Essential hypertension    DM (diabetes  mellitus), type 2 (HCC)    NSVT (nonsustained ventricular tachycardia)    Coronary artery disease of native artery of native heart with stable angina pectoris (HCC)    Hyponatremia    Acute respiratory failure with hypoxia (HCC)    CAD s/p CABG   - post-op management per CTS   - aspirin, clopidogrel, rosuvastatin    Hypertension, above goal    - resumed home amlodipine, sacubitril-valsartan   - metoprolol tartrate 50mg BID    Hyponatremia, improving   - following BMP, off diuretics for now   - Na tabs started 12/26    Stone Mcneal MD   12/27/22  08:56 EST

## 2022-12-27 NOTE — CASE MANAGEMENT/SOCIAL WORK
Continued Stay Note  [facility]     Patient Name: Stone Villeda  MRN: [id number]  Today's Date: 12/27/2022    Admit Date: 12/23/2022    Plan: home   Discharge Plan     Row Name 12/27/22 1019       Plan    Plan home    Patient/Family in Agreement with Plan yes    Plan Comments Mr. Villeda remains in the ICU on O2 at 4L per NC.  He is post op CABG X 2.  Plan is still home at discharge.  CM following for discharge planning.    Final Discharge Disposition Code 01 - home or self-care               Discharge Codes    No documentation.               Expected Discharge Date and Time     Expected Discharge Date Expected Discharge Time    Dec 31, 2022             Krystle Amado RN

## 2022-12-27 NOTE — THERAPY TREATMENT NOTE
Patient Name: Stone Villeda  : 1975    MRN: [id number]                              Today's Date: 2022       Admit Date: 2022    Visit Dx:     ICD-10-CM ICD-9-CM   1. 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 pectoris (HCC)  I25.119 414.01     413.9     Patient Active Problem List   Diagnosis   • Other chest pain   • Essential hypertension   • Hyperlipidemia   • Smoking   • Family history of early CAD   • Shortness of breath   • Palpitations   • DM (diabetes mellitus), type 2 (HCC)   • PVC (premature ventricular contraction)   • NSVT (nonsustained ventricular tachycardia)   • Grade I diastolic dysfunction   • Coronary artery disease of native artery of native heart with stable angina pectoris (HCC)   • Dizziness   • Abnormal stress test   • Peripheral edema   • Ischemic cardiomyopathy   • 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 pectoris (HCC)   • Hyponatremia   • Acute respiratory failure with hypoxia (HCC)     Past Medical History:   Diagnosis Date   • Coronary artery disease    • COVID-19 02/15/2022   • Diabetes mellitus (HCC)    • Hyperlipidemia    • Hypertension    • Sleep apnea     NO CPAP     Past Surgical History:   Procedure Laterality Date   • CARDIAC CATHETERIZATION      X2; NO INTERVENTION   • CHOLECYSTECTOMY     • HAND SURGERY Right    • WISDOM TOOTH EXTRACTION        General Information     Row Name 22 1013          Physical Therapy Time and Intention    Document Type therapy note (daily note)  -     Mode of Treatment physical therapy  -     Row Name 22 1013          General Information    Patient Profile Reviewed yes  -HM     Existing Precautions/Restrictions cardiac;oxygen therapy device and L/min;sternal  -     Row Name 22 1013          Cognition    Orientation Status (Cognition) oriented x 4  -     Row Name 22 1013          Safety Issues, Functional Mobility     Safety Issues Affecting Function (Mobility) awareness of need for assistance;insight into deficits/self-awareness;safety precaution awareness;safety precautions follow-through/compliance  -     Impairments Affecting Function (Mobility) endurance/activity tolerance;shortness of breath  -           User Key  (r) = Recorded By, (t) = Taken By, (c) = Cosigned By    Initials Name Provider Type     Hue Walker PT Physical Therapist               Mobility     Row Name 12/27/22 1014          Sit-Stand Transfer    Sit-Stand [place] (Transfers) contact guard;1 person assist  -     Row Name 12/27/22 1014          Gait/Stairs (Locomotion)    [place] Level (Gait) contact guard;1 person assist  -     Distance in Feet (Gait) 440  -HM     Deviations/Abnormal Patterns (Gait) bilateral deviations;base of support, wide  -HM     Bilateral Gait Deviations forward flexed posture  -     Comment, (Gait/Stairs) pt required cues for decreased gait speed for line management. Pt had no LOB with turns or navigating obstacles  -           User Key  (r) = Recorded By, (t) = Taken By, (c) = Cosigned By    Initials Name Provider Type     Hue Walker PT Physical Therapist               Obj/Interventions     Row Name 12/27/22 1016          Motor Skills    Therapeutic Exercise other (see comments)  BLE APs, hip ab/ad, heel slides, SLR, LAQ, seated marches x 10 reps; BLE shoulder flexion and abduction within sternal precautions x 10 reps  -     Row Name 12/27/22 1016          Balance    Balance Assessment sitting static balance;sitting dynamic balance;sit to stand dynamic balance;standing static balance;standing dynamic balance  -     Static Sitting Balance independent  -     Dynamic Sitting Balance independent  -     Position, Sitting Balance sitting in chair  -     Sit to Stand Dynamic Balance contact guard  -     Static Standing Balance contact guard  -     Dynamic Standing Balance contact guard   -HM     Position/Device Used, Standing Balance unsupported  -           User Key  (r) = Recorded By, (t) = Taken By, (c) = Cosigned By    Initials Name Provider Type     Hue Walker PT Physical Therapist               Goals/Plan    No documentation.                Clinical Impression     Row Name 12/27/22 1017          Pain    Pretreatment Pain Rating 0/10 - no pain  -     Posttreatment Pain Rating 0/10 - no pain  -     Row Name 12/27/22 1017          Plan of Care Review    Plan of Care Reviewed With patient  -HM     Progress improving  -     Outcome Evaluation Pt ambulated 440 feet CGA with no AD with increased gait speed and required cues to slow down for safety and line management. Pt gave good effort with therex. Reviewed sternal precautions. Will continue to progress IPPT POC as tolerated. Rec stair training next PT session.  -     Row Name 12/27/22 1017          Vital Signs    Pre Systolic BP Rehab 127  -HM     Pre Treatment Diastolic BP 83  -HM     Post Systolic BP Rehab 118  -HM     Post Treatment Diastolic BP 76  -HM     Pretreatment Heart Rate (beats/min) 91  -HM     Posttreatment Heart Rate (beats/min) 92  -HM     Pre SpO2 (%) 97  -HM     O2 Delivery Pre Treatment nasal cannula  -HM     O2 Delivery Intra Treatment nasal cannula  -HM     Post SpO2 (%) 99  -HM     O2 Delivery Post Treatment nasal cannula  -HM     Pre Patient Position Sitting  -     Intra Patient Position Standing  -HM     Post Patient Position Sitting  -     Row Name 12/27/22 1017          Positioning and Restraints    Pre-Treatment Position sitting in chair/recliner  -     Post Treatment Position chair  -HM     In Chair notified nsg;reclined;call light within reach;sitting;encouraged to call for assist;LUE elevated;RUE elevated;waffle cushion  -           User Key  (r) = Recorded By, (t) = Taken By, (c) = Cosigned By    Initials Name Provider Type     Hue Walker PT Physical Therapist               Outcome  Measures     Row Name 12/27/22 1021 12/27/22 0800       How much help from another person do you currently need...    Turning from your back to your side while in flat bed without using bedrails? 3  - 3  -KR    Moving from lying on back to sitting on the side of a flat bed without bedrails? 3  -HM 3  -KR    Moving to and from a bed to a chair (including a wheelchair)? 3  -HM 3  -KR    Standing up from a chair using your arms (e.g., wheelchair, bedside chair)? 3  - 3  -KR    Climbing 3-5 steps with a railing? 3  -HM 3  -KR    To walk in hospital room? 3  -HM 3  -KR    AM-PAC 6 Clicks Score (PT) 18  - 18  -KR    Highest level of mobility 6 --> Walked 10 steps or more  - 6 --> Walked 10 steps or more  -KR    Row Name 12/27/22 1021          Functional Assessment    Outcome Measure Options AM-PAC 6 Clicks Basic Mobility (PT)  -           User Key  (r) = Recorded By, (t) = Taken By, (c) = Cosigned By    Initials Name Provider Type    Jaqueline Armenta, RN Registered Nurse     Hue Walker, MICHAEL Physical Therapist                             Physical Therapy Education     Title: PT OT SLP Therapies (Done)     Topic: Physical Therapy (Done)     Point: Mobility training (Done)     Learning Progress Summary           Patient Acceptance, E,TB, VU,NR by  at 12/27/2022 1021    Comment: reviewed sternal precautions throughout session and with exercises    Acceptance, E, VU by AE at 12/26/2022 0910    Acceptance, E, VU,NR by AGGIE at 12/24/2022 1146                   Point: Home exercise program (Done)     Learning Progress Summary           Patient Acceptance, E,TB, VU,NR by  at 12/27/2022 1021    Comment: reviewed sternal precautions throughout session and with exercises                   Point: Body mechanics (Done)     Learning Progress Summary           Patient Acceptance, E,TB, VU,NR by  at 12/27/2022 1021    Comment: reviewed sternal precautions throughout session and with exercises    Acceptance, E, VU  by AE at 12/26/2022 0910    Acceptance, E, VU,NR by AGGIE at 12/24/2022 1146                   Point: Precautions (Done)     Learning Progress Summary           Patient Acceptance, E,TB, VU,NR by  at 12/27/2022 1021    Comment: reviewed sternal precautions throughout session and with exercises    Acceptance, E, VU by AE at 12/26/2022 0910    Acceptance, E, VU,NR by AGGIE at 12/24/2022 1146                               User Key     Initials Effective Dates Name Provider Type Discipline     06/16/21 -  Danita Villanueva, PT Physical Therapist PT    AE 09/21/21 -  Rudolph Morales, PT Physical Therapist PT     09/22/22 -  Hue Walker PT Physical Therapist PT              PT Recommendation and Plan     Plan of Care Reviewed With: patient  Progress: improving  Outcome Evaluation: Pt ambulated 440 feet CGA with no AD with increased gait speed and required cues to slow down for safety and line management. Pt gave good effort with therex. Reviewed sternal precautions. Will continue to progress IPPT POC as tolerated. Rec stair training next PT session.     Time Calculation:    PT Charges     Row Name 12/27/22 1024             Time Calculation    Start Time 0930  -HM      PT Received On 12/27/22  -HM         Timed Charges    42368 - PT Therapeutic Exercise Minutes 15  -HM      66382 - PT Therapeutic Activity Minutes 10  -HM         Total Minutes    Timed Charges Total Minutes 25  -HM       Total Minutes 25  -HM            User Key  (r) = Recorded By, (t) = Taken By, (c) = Cosigned By    Initials Name Provider Type     Hue Walker PT Physical Therapist              Therapy Charges for Today     Code Description Service Date Service Provider Modifiers Qty    26307799070 HC PT THER PROC EA 15 MIN 12/27/2022 Hue Walker, PT GP 1    84455614349 HC PT THERAPEUTIC ACT EA 15 MIN 12/27/2022 Heu Walker, PT GP 1          PT G-Codes  Outcome Measure Options: AM-PAC 6 Clicks Basic Mobility (PT)  AM-PAC 6 Clicks  Score (PT): 18  PT Discharge Summary  Anticipated Discharge Disposition (PT): home with assist    Hue Walker, MICHAEL  12/27/2022

## 2022-12-27 NOTE — PLAN OF CARE
Goal Outcome Evaluation:      Pt slept in chair until around 0400 hours, c/o nausea -medicated PRN, returned to bed w/o difficulty. Able to wean o2 to 4liters BNC, denies SOA and CP, does have occasional incisional pain w/coughing -did have one Norco in PM. Adequate UOP noted, positive flatus and one BM overnight. Remains afebrile, NAD noted, continue to monitor.

## 2022-12-27 NOTE — PROGRESS NOTES
"INTENSIVIST   PROGRESS NOTE     Hospital:  LOS: 4 days     Chief Complaint: Postoperative management    Subjective   S     Interval History: No acute issues overnight.  Afebrile.  Hemodynamically stable.  Did not require BiPAP during sleep and is been stable on nasal cannula from an oxygenation standpoint since yesterday morning.  Upon awakening patient does have some nausea but no abdominal pain, vomiting, diarrhea.  No chest pain, shortness of breath, syncope, presyncope, cough with rest or ambulation.    The patient's relevant past medical, surgical and social history were reviewed and updated in Epic as appropriate.      ROS: Fourteen point review of system performed and negative except for that mentioned in HPI.     Objective   O     Intake/Ouptut 24 hrs (7:00AM - 6:59 AM)  Intake & Output (last 3 days)       12/24 0701 12/25 0700 12/25 0701 12/26 0700 12/26 0701 12/27 0700 12/27 0701 12/28 0700    P.O. 2200 360 1380     I.V. (mL/kg) 1869.5 (16) 245 (2.1) 95.5 (0.8)     Blood        IV Piggyback 200       Total Intake(mL/kg) 4269.5 (36.5) 605 (5.2) 1475.5 (12.6)     Urine (mL/kg/hr) 1050 (0.4) 4200 (1.5) 3885 (1.4) 75 (0.2)    Emesis/NG output   0     Stool   1     Chest Tube 310 40      Total Output 1360 4240 3886 75    Net +2909.5 -3635 -2410.5 -75            Stool Unmeasured Occurrence   3 x         Medications (drips):  dexmedetomidine, Last Rate: 0.4 mcg/kg/hr (12/23/22 1700)  niCARdipine, Last Rate: Stopped (12/23/22 1230)  nitroglycerin, Last Rate: Stopped (12/26/22 1300)  norepinephrine, Last Rate: Stopped (12/24/22 0000)  phenylephrine    Respiratory Support: Supplemental oxygen via nasal cannula (4L)    Physical Examination:  Vital Signs: Blood pressure 151/82, pulse 92, temperature 98 °F (36.7 °C), temperature source Oral, resp. rate 24, height 193 cm (75.98\"), weight 117 kg (258 lb), SpO2 95 %.    General: The patient appears in no acute distress. Alert, cooperative and interactive.   Neck: " Trachea midline, No masses.   Chest: Diminished breath sounds at bilateral bases (slightly improved from yesterday).  No expiratory wheeze or rhonchi.  Cardiac: Normal rate, S1S2 auscultated. No murmurs, rubs or gallops.   Extremities: No lower extremity edema. No clubbing or cyanosis.   Neuro: Motor power grossly intact bilaterally. Sensation intact. Speech fluid and fluent. Thought process coherent.   Psych: Alert and oriented x3. Mood stable.     Lines, Drains & Airways     Active LDAs     Name Placement date Placement time Site Days    CVC Double Lumen 12/23/22 Right Internal jugular 12/23/22  0720  created via procedure documentation  Internal jugular  1    Peripheral IV 12/23/22 0606 Left;Medial Wrist 12/23/22  0606  Wrist  1    Urethral Catheter Temperature probe;Silicone 16 Fr. 12/23/22  --  -- 1    Y Chest Tube 1 and 2 1 Right Mediastinal 32 Fr. 2 Right Mediastinal 32 Fr. 12/23/22  --  -- 1    Y Chest Tube 3 and 4 3 Right Mediastinal 32 Fr. 4 Right Mediastinal 32 Fr. 12/23/22  --  -- 1    Arterial Line 12/23/22 Right Radial 12/23/22  0630  created via procedure documentation  Radial  1              Results from last 7 days   Lab Units 12/26/22  0653 12/25/22  0505 12/24/22  0329   WBC 10*3/mm3 11.59* 14.01* 12.22*   HEMOGLOBIN g/dL 11.9* 11.4* 11.9*   MCV fL 86.1 85.4 88.1   PLATELETS 10*3/mm3 180 182 181     Results from last 7 days   Lab Units 12/27/22  0354 12/26/22  2218 12/26/22  1616 12/26/22  1402 12/26/22  0453 12/25/22  2054 12/25/22  0505 12/24/22  0329 12/23/22  1518 12/23/22  1122   SODIUM mmol/L 127*  127* 125* 121*   < > 123* 123*   < > 133* 138 136   POTASSIUM mmol/L 4.3  --   --   --  4.3 3.7   < > 4.2 3.9 4.8   CO2 mmol/L 24.0  --   --   --  26.0 25.0   < > 22.0 23.0 24.0   CREATININE mg/dL 0.56*  --   --   --  0.56* 0.67*   < > 0.84 0.77 0.79   GLUCOSE mg/dL 128*  --   --   --  119* 128*   < > 156* 170* 179*   MAGNESIUM mg/dL  --   --   --   --   --   --   --  2.4 2.5 1.9   PHOSPHORUS  mg/dL  --   --   --   --   --   --   --  3.4 3.7 4.3    < > = values in this interval not displayed.     Estimated Creatinine Clearance: 230.6 mL/min (A) (by C-G formula based on SCr of 0.56 mg/dL (L)).        Results from last 7 days   Lab Units 12/25/22  1743 12/23/22  1631 12/23/22  1121 12/23/22  0934 12/23/22  0911   PH, ARTERIAL pH units 7.496* 7.332* 7.330* 7.29* 7.31*   PCO2, ARTERIAL mm Hg 33.1* 45.4* 47.7*  --   --    PO2 ART mm Hg 51.8* 68.6* 121.0*  --   --    FIO2 % 100 40 100  --   --      CXR (12/27/2022):  Radiology impression:  Support hardware projects unchanged. Bibasilar atelectasis persists.  There is no new focal opacity. There is no distinct pneumothorax or  significant effusion. Unchanged cardiac enlargement.     Results: Reviewed.  I reviewed the patient's new laboratory and imaging results.  I independently reviewed the patient's new images.    Medications: Reviewed.    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     Patient Ronal is a 47M with a history of HTN, HLD, T2DM, VT, ischemic cardiomyopathy, CAD and ongoing tobacco use who underwent CABG x 2 by Dr. Walker (12/23). He is admitted to the ICU for postoperative management. While he did not have any intra-operative arrhythmias, patient placed on Amiodarone infusion for prophylaxis secondary to his history of VT. Hospital course has been  complicated by continued hypoxic respiratory failure requiring high flow nasal cannula and hyponatremia.     Preoperative spirometry reviewed and negative for airway obstruction.     -Continue salt tabs and fluid restriction.  Sodium improving  -Continue Amiodarone   -Insulin drip for diabetes management; Transitioned (12/24)  -Chest tube management per CTS  -Pain control/supportive care  -Needs smoking cessation  -Reinitiate Plavix (12/24)  -Held DVT prophylaxis due to hematuria after Plavix initiated.  Can restart today. H/H stable    High level of risk due to:  severe exacerbation of chronic illness and illness with threat to life or bodily function.    Plan of care and goals reviewed during interdisciplinary rounds.  I discussed the patient's findings and my recommendations with patient    -- Ko Garcia MD  Pulmonary/Critical Care

## 2022-12-27 NOTE — PLAN OF CARE
Goal Outcome Evaluation:  Plan of Care Reviewed With: patient        Progress: improving       Pt A&Ox4, FRIAS =. Ambu x 440ft & 660 ft, did well. VSS on 3L NC. IS 2000, encouraged. NSR. +BS. Nausea throughout shift, promethazine added to MAR but not yet given. Zofran given for emetic episode following lunch. Symptoms resolved temporarily and returned a few hours later, pt refusing any medication at this time, states nausea is manageable. UOP 2060 mL. Bumex 2mg given. Afebrile. Pt denies pain.      Sodium 122 @ 1000, 50 mL 3% saline bolus given, recheck sodium 126/127 at 1600. Next recheck ordered for 2200, sodium tabs increased to 2 gram TID per intensivist.

## 2022-12-28 LAB
ALBUMIN SERPL-MCNC: 3.3 G/DL (ref 3.5–5.2)
ALBUMIN/GLOB SERPL: 1.1 G/DL
ALP SERPL-CCNC: 70 U/L (ref 39–117)
ALT SERPL W P-5'-P-CCNC: 15 U/L (ref 1–41)
ANION GAP SERPL CALCULATED.3IONS-SCNC: 9 MMOL/L (ref 5–15)
AST SERPL-CCNC: 18 U/L (ref 1–40)
BILIRUB SERPL-MCNC: 0.3 MG/DL (ref 0–1.2)
BUN SERPL-MCNC: 15 MG/DL (ref 6–20)
BUN/CREAT SERPL: 27.3 (ref 7–25)
CALCIUM SPEC-SCNC: 8.5 MG/DL (ref 8.6–10.5)
CHLORIDE SERPL-SCNC: 92 MMOL/L (ref 98–107)
CO2 SERPL-SCNC: 23 MMOL/L (ref 22–29)
CREAT SERPL-MCNC: 0.55 MG/DL (ref 0.76–1.27)
DEPRECATED RDW RBC AUTO: 39.3 FL (ref 37–54)
EGFRCR SERPLBLD CKD-EPI 2021: 123.8 ML/MIN/1.73
ERYTHROCYTE [DISTWIDTH] IN BLOOD BY AUTOMATED COUNT: 12.6 % (ref 12.3–15.4)
GLOBULIN UR ELPH-MCNC: 3 GM/DL
GLUCOSE BLDC GLUCOMTR-MCNC: 104 MG/DL (ref 70–130)
GLUCOSE BLDC GLUCOMTR-MCNC: 115 MG/DL (ref 70–130)
GLUCOSE BLDC GLUCOMTR-MCNC: 119 MG/DL (ref 70–130)
GLUCOSE BLDC GLUCOMTR-MCNC: 127 MG/DL (ref 70–130)
GLUCOSE SERPL-MCNC: 133 MG/DL (ref 65–99)
HCT VFR BLD AUTO: 39.4 % (ref 37.5–51)
HGB BLD-MCNC: 13.4 G/DL (ref 13–17.7)
MCH RBC QN AUTO: 29.3 PG (ref 26.6–33)
MCHC RBC AUTO-ENTMCNC: 34 G/DL (ref 31.5–35.7)
MCV RBC AUTO: 86.2 FL (ref 79–97)
PA ADP PRP-ACNC: 187 PRU
PLATELET # BLD AUTO: 250 10*3/MM3 (ref 140–450)
PMV BLD AUTO: 10.4 FL (ref 6–12)
POTASSIUM SERPL-SCNC: 4 MMOL/L (ref 3.5–5.2)
PROT SERPL-MCNC: 6.3 G/DL (ref 6–8.5)
RBC # BLD AUTO: 4.57 10*6/MM3 (ref 4.14–5.8)
SODIUM SERPL-SCNC: 124 MMOL/L (ref 136–145)
SODIUM SERPL-SCNC: 124 MMOL/L (ref 136–145)
SODIUM SERPL-SCNC: 126 MMOL/L (ref 136–145)
SODIUM SERPL-SCNC: 127 MMOL/L (ref 136–145)
SODIUM SERPL-SCNC: 132 MMOL/L (ref 136–145)
WBC NRBC COR # BLD: 11.49 10*3/MM3 (ref 3.4–10.8)

## 2022-12-28 PROCEDURE — 25010000002 ONDANSETRON PER 1 MG: Performed by: STUDENT IN AN ORGANIZED HEALTH CARE EDUCATION/TRAINING PROGRAM

## 2022-12-28 PROCEDURE — 84295 ASSAY OF SERUM SODIUM: CPT | Performed by: THORACIC SURGERY (CARDIOTHORACIC VASCULAR SURGERY)

## 2022-12-28 PROCEDURE — 99232 SBSQ HOSP IP/OBS MODERATE 35: CPT | Performed by: INTERNAL MEDICINE

## 2022-12-28 PROCEDURE — 80053 COMPREHEN METABOLIC PANEL: CPT | Performed by: THORACIC SURGERY (CARDIOTHORACIC VASCULAR SURGERY)

## 2022-12-28 PROCEDURE — 25010000002 HEPARIN (PORCINE) PER 1000 UNITS: Performed by: STUDENT IN AN ORGANIZED HEALTH CARE EDUCATION/TRAINING PROGRAM

## 2022-12-28 PROCEDURE — 99024 POSTOP FOLLOW-UP VISIT: CPT | Performed by: THORACIC SURGERY (CARDIOTHORACIC VASCULAR SURGERY)

## 2022-12-28 PROCEDURE — 25010000002 HEPARIN (PORCINE) PER 1000 UNITS: Performed by: INTERNAL MEDICINE

## 2022-12-28 PROCEDURE — 97110 THERAPEUTIC EXERCISES: CPT

## 2022-12-28 PROCEDURE — 82962 GLUCOSE BLOOD TEST: CPT

## 2022-12-28 PROCEDURE — 84295 ASSAY OF SERUM SODIUM: CPT | Performed by: INTERNAL MEDICINE

## 2022-12-28 PROCEDURE — 99233 SBSQ HOSP IP/OBS HIGH 50: CPT | Performed by: INTERNAL MEDICINE

## 2022-12-28 PROCEDURE — 85576 BLOOD PLATELET AGGREGATION: CPT | Performed by: PHYSICIAN ASSISTANT

## 2022-12-28 PROCEDURE — 97530 THERAPEUTIC ACTIVITIES: CPT

## 2022-12-28 PROCEDURE — 63710000001 PROMETHAZINE PER 12.5 MG: Performed by: INTERNAL MEDICINE

## 2022-12-28 PROCEDURE — 85027 COMPLETE CBC AUTOMATED: CPT | Performed by: THORACIC SURGERY (CARDIOTHORACIC VASCULAR SURGERY)

## 2022-12-28 RX ORDER — BISACODYL 5 MG/1
10 TABLET, DELAYED RELEASE ORAL DAILY PRN
Status: DISCONTINUED | OUTPATIENT
Start: 2022-12-28 | End: 2022-12-30 | Stop reason: HOSPADM

## 2022-12-28 RX ORDER — BISACODYL 10 MG
10 SUPPOSITORY, RECTAL RECTAL DAILY PRN
Status: DISCONTINUED | OUTPATIENT
Start: 2022-12-28 | End: 2022-12-30 | Stop reason: HOSPADM

## 2022-12-28 RX ORDER — AMOXICILLIN 250 MG
2 CAPSULE ORAL 2 TIMES DAILY PRN
Status: DISCONTINUED | OUTPATIENT
Start: 2022-12-28 | End: 2022-12-30 | Stop reason: HOSPADM

## 2022-12-28 RX ORDER — CLOPIDOGREL BISULFATE 75 MG/1
150 TABLET ORAL ONCE
Status: COMPLETED | OUTPATIENT
Start: 2022-12-28 | End: 2022-12-28

## 2022-12-28 RX ORDER — SODIUM CHLORIDE 0.9 % (FLUSH) 0.9 %
10 SYRINGE (ML) INJECTION EVERY 8 HOURS SCHEDULED
Status: DISCONTINUED | OUTPATIENT
Start: 2022-12-28 | End: 2022-12-30 | Stop reason: HOSPADM

## 2022-12-28 RX ORDER — BUMETANIDE 0.25 MG/ML
2 INJECTION INTRAMUSCULAR; INTRAVENOUS ONCE
Status: COMPLETED | OUTPATIENT
Start: 2022-12-28 | End: 2022-12-28

## 2022-12-28 RX ORDER — SODIUM CHLORIDE 0.9 % (FLUSH) 0.9 %
10 SYRINGE (ML) INJECTION EVERY 12 HOURS SCHEDULED
Status: DISCONTINUED | OUTPATIENT
Start: 2022-12-28 | End: 2022-12-30 | Stop reason: HOSPADM

## 2022-12-28 RX ORDER — DOCUSATE SODIUM 100 MG/1
100 CAPSULE, LIQUID FILLED ORAL 2 TIMES DAILY PRN
Status: DISCONTINUED | OUTPATIENT
Start: 2022-12-28 | End: 2022-12-30 | Stop reason: HOSPADM

## 2022-12-28 RX ADMIN — Medication 10 ML: at 21:51

## 2022-12-28 RX ADMIN — SODIUM CHLORIDE 2 G: 1 TABLET ORAL at 17:13

## 2022-12-28 RX ADMIN — Medication 10 ML: at 14:27

## 2022-12-28 RX ADMIN — HEPARIN SODIUM 5000 UNITS: 5000 INJECTION INTRAVENOUS; SUBCUTANEOUS at 14:26

## 2022-12-28 RX ADMIN — AMLODIPINE BESYLATE 10 MG: 10 TABLET ORAL at 08:16

## 2022-12-28 RX ADMIN — ACETAMINOPHEN 650 MG: 325 TABLET ORAL at 21:47

## 2022-12-28 RX ADMIN — SODIUM CHLORIDE 2 G: 1 TABLET ORAL at 08:17

## 2022-12-28 RX ADMIN — BUMETANIDE 2 MG: 0.25 INJECTION, SOLUTION INTRAMUSCULAR; INTRAVENOUS at 08:16

## 2022-12-28 RX ADMIN — SODIUM CHLORIDE 2 G: 1 TABLET ORAL at 12:07

## 2022-12-28 RX ADMIN — CLOPIDOGREL BISULFATE 150 MG: 75 TABLET ORAL at 08:17

## 2022-12-28 RX ADMIN — ONDANSETRON 4 MG: 2 INJECTION INTRAMUSCULAR; INTRAVENOUS at 23:47

## 2022-12-28 RX ADMIN — ASPIRIN 325 MG: 325 TABLET, COATED ORAL at 08:16

## 2022-12-28 RX ADMIN — SACUBITRIL AND VALSARTAN 1 TABLET: 49; 51 TABLET, FILM COATED ORAL at 08:16

## 2022-12-28 RX ADMIN — METOPROLOL TARTRATE 50 MG: 50 TABLET, FILM COATED ORAL at 08:17

## 2022-12-28 RX ADMIN — METOPROLOL TARTRATE 50 MG: 50 TABLET, FILM COATED ORAL at 21:47

## 2022-12-28 RX ADMIN — HEPARIN SODIUM 5000 UNITS: 5000 INJECTION INTRAVENOUS; SUBCUTANEOUS at 21:47

## 2022-12-28 RX ADMIN — PROMETHAZINE HYDROCHLORIDE 12.5 MG: 12.5 TABLET ORAL at 00:26

## 2022-12-28 RX ADMIN — SACUBITRIL AND VALSARTAN 1 TABLET: 49; 51 TABLET, FILM COATED ORAL at 21:47

## 2022-12-28 RX ADMIN — CLOPIDOGREL BISULFATE 75 MG: 75 TABLET ORAL at 08:18

## 2022-12-28 RX ADMIN — ACETAMINOPHEN 650 MG: 325 TABLET ORAL at 14:26

## 2022-12-28 RX ADMIN — ROSUVASTATIN 20 MG: 20 TABLET, FILM COATED ORAL at 21:47

## 2022-12-28 RX ADMIN — HEPARIN SODIUM 5000 UNITS: 5000 INJECTION INTRAVENOUS; SUBCUTANEOUS at 05:45

## 2022-12-28 RX ADMIN — GABAPENTIN 100 MG: 100 CAPSULE ORAL at 05:45

## 2022-12-28 RX ADMIN — ACETAMINOPHEN 650 MG: 325 TABLET ORAL at 05:45

## 2022-12-28 NOTE — PROGRESS NOTES
"INTENSIVIST   PROGRESS NOTE     Hospital:  LOS: 5 days     Chief Complaint: Postoperative management    Subjective   S     Interval History: No acute issues overnight.  Afebrile.  Has remained stable on supplemental nasal canula.  No chest pain, dyspnea, syncope, presyncope with rest or ambulation.  Nausea has improved over the last 24 hours.    The patient's relevant past medical, surgical and social history were reviewed and updated in Epic as appropriate.      ROS: Fourteen point review of system performed and negative except for that mentioned in HPI.     Objective   O     Intake/Ouptut 24 hrs (7:00AM - 6:59 AM)  Intake & Output (last 3 days)       12/25 0701 12/26 0700 12/26 0701 12/27 0700 12/27 0701 12/28 0700 12/28 0701 12/29 0700    P.O. 360 1380 1360 200    I.V. (mL/kg) 245 (2.1) 95.5 (0.8) 50 (0.4)     IV Piggyback        Total Intake(mL/kg) 605 (5.2) 1475.5 (12.6) 1410 (12.1) 200 (1.7)    Urine (mL/kg/hr) 4200 (1.5) 3885 (1.4) 3060 (1.1) 650 (0.7)    Emesis/NG output  0 25     Stool  1      Chest Tube 40       Total Output 4240 3886 3085 650    Net -3635 -2410.5 -1675 -450            Stool Unmeasured Occurrence  3 x           Respiratory Support: Supplemental oxygen via nasal cannula     Physical Examination:  Vital Signs: Blood pressure 115/76, pulse 86, temperature 99 °F (37.2 °C), temperature source Oral, resp. rate 20, height 193 cm (75.98\"), weight 117 kg (258 lb), SpO2 93 %.    General: The patient appears in no acute distress. Alert, cooperative and interactive.  Sitting upright in bedside chair during assessment.  Neck: Trachea midline, No masses.   Chest: Improved aeration.  No expiratory wheeze or rhonchi.  On supplemental nasal cannula with appropriate oxygen saturations.  Cardiac: Normal rate, S1S2 auscultated. No murmurs, rubs or gallops.   Extremities: No lower extremity edema. No clubbing or cyanosis.   Skin: Surgical site without surrounding erythema, bleeding or purulent " drainage.  Neuro: Motor power grossly intact bilaterally. Sensation intact. Speech fluid and fluent. Thought process coherent.   Psych: Alert and oriented x4. Mood stable.     Lines, Drains & Airways     Active LDAs     Name Placement date Placement time Site Days    CVC Double Lumen 12/23/22 Right Internal jugular 12/23/22  0720  created via procedure documentation  Internal jugular  1    Peripheral IV 12/23/22 0606 Left;Medial Wrist 12/23/22  0606  Wrist  1    Urethral Catheter Temperature probe;Silicone 16 Fr. 12/23/22  --  -- 1    Y Chest Tube 1 and 2 1 Right Mediastinal 32 Fr. 2 Right Mediastinal 32 Fr. 12/23/22  --  -- 1    Y Chest Tube 3 and 4 3 Right Mediastinal 32 Fr. 4 Right Mediastinal 32 Fr. 12/23/22  --  -- 1    Arterial Line 12/23/22 Right Radial 12/23/22  0630  created via procedure documentation  Radial  1              Results from last 7 days   Lab Units 12/28/22  0409 12/26/22  0653 12/25/22  0505   WBC 10*3/mm3 11.49* 11.59* 14.01*   HEMOGLOBIN g/dL 13.4 11.9* 11.4*   MCV fL 86.2 86.1 85.4   PLATELETS 10*3/mm3 250 180 182     Results from last 7 days   Lab Units 12/28/22  0531 12/28/22  0409 12/27/22  2243 12/27/22  1025 12/27/22  0354 12/26/22  1402 12/26/22  0453 12/25/22  0505 12/24/22  0329 12/23/22  1518 12/23/22  1122   SODIUM mmol/L 126* 124*  124* 130*   < > 127*  127*   < > 123*   < > 133* 138 136   POTASSIUM mmol/L  --  4.0  --   --  4.3  --  4.3   < > 4.2 3.9 4.8   CO2 mmol/L  --  23.0  --   --  24.0  --  26.0   < > 22.0 23.0 24.0   CREATININE mg/dL  --  0.55*  --   --  0.56*  --  0.56*   < > 0.84 0.77 0.79   GLUCOSE mg/dL  --  133*  --   --  128*  --  119*   < > 156* 170* 179*   MAGNESIUM mg/dL  --   --   --   --   --   --   --   --  2.4 2.5 1.9   PHOSPHORUS mg/dL  --   --   --   --   --   --   --   --  3.4 3.7 4.3    < > = values in this interval not displayed.     Estimated Creatinine Clearance: 234.8 mL/min (A) (by C-G formula based on SCr of 0.55 mg/dL (L)).  Results from last 7  days   Lab Units 12/28/22  0409   ALK PHOS U/L 70   BILIRUBIN mg/dL 0.3   ALT (SGPT) U/L 15   AST (SGOT) U/L 18       Results from last 7 days   Lab Units 12/25/22  1743 12/23/22  1631 12/23/22  1121 12/23/22  0934 12/23/22  0911   PH, ARTERIAL pH units 7.496* 7.332* 7.330* 7.29* 7.31*   PCO2, ARTERIAL mm Hg 33.1* 45.4* 47.7*  --   --    PO2 ART mm Hg 51.8* 68.6* 121.0*  --   --    FIO2 % 100 40 100  --   --      CXR (12/27/2022):  Radiology impression:  Support hardware projects unchanged. Bibasilar atelectasis persists.  There is no new focal opacity. There is no distinct pneumothorax or  significant effusion. Unchanged cardiac enlargement.     Results: Reviewed.  I reviewed the patient's new laboratory and imaging results.  I independently reviewed the patient's new images.    Medications: Reviewed.    Assessment & Plan   A / P     Active Hospital Problems:  Active Hospital Problems    Diagnosis  POA   • **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 pectoris (HCC) [I25.119]  Yes   • Hyponatremia [E87.1]  No   • Acute respiratory failure with hypoxia (HCC) [J96.01]  No   • Coronary artery disease of native artery of native heart with stable angina pectoris (HCC) [I25.118]  Yes   • NSVT (nonsustained ventricular tachycardia) [I47.29]  Yes   • DM (diabetes mellitus), type 2 (HCC) [E11.9]  Yes   • Essential hypertension [I10]  Yes      Resolved Hospital Problems   No resolved problems to display.     Patient Ronal is a 47M with a history of HTN, HLD, T2DM, VT, ischemic cardiomyopathy, CAD and ongoing tobacco use who underwent CABG x 2 by Dr. Walker (12/23). He is admitted to the ICU for postoperative management. While he did not have any intra-operative arrhythmias, patient placed on Amiodarone infusion for prophylaxis secondary to his history of VT. Hospital course has been complicated by continued hypoxic respiratory failure requiring high flow nasal cannula and  hyponatremia.  Now on nasal cannula.  Hyponatremia improving with fluid restriction and salt tabs.    Preoperative spirometry reviewed and negative for airway obstruction.     -Continue salt tabs (2g TID) and fluid restriction for hyponatremia. Should continue to monitor sodium at least every 8 hours  -Continue Amiodarone   -Insulin drip for diabetes management; Transitioned (12/24)  -Chest tube(s) removed   -Pain control/supportive care  -Needs smoking cessation  -Reinitiate Plavix (12/24)  -On DVT prophylaxis   -Diuresed this AM (per CTS)   -Transfer to telemetry     High level of risk due to:  severe exacerbation of chronic illness and illness with threat to life or bodily function.    Plan of care and goals reviewed during interdisciplinary rounds.  I discussed the patient's findings and my recommendations with patient    -- Ko Garcia MD  Pulmonary/Critical Care

## 2022-12-28 NOTE — PLAN OF CARE
Goal Outcome Evaluation:  Plan of Care Reviewed With: patient        Progress: improving  Outcome Evaluation: Pt ambulated 500 feet supervision with no AD and ascended/descended 3 stairs CGA with cueing for step to step pattern. Pt improving with gait mechanics and safety awareness at this date. Will continue to progress IPPT POC as tolerated.

## 2022-12-28 NOTE — CASE MANAGEMENT/SOCIAL WORK
Continued Stay Note  [facility]     Patient Name: Stone Villeda  MRN: [id number]  Today's Date: 12/28/2022    Admit Date: 12/23/2022    Plan: home   Discharge Plan     Row Name 12/28/22 1255       Plan    Plan home    Plan Comments Mr. Villeda has orders for telemetry today.  He is on O2 at 2 L per NC.  He ambulated 500ft last night.   Plan is home at discharge.    Final Discharge Disposition Code 01 - home or self-care               Discharge Codes    No documentation.               Expected Discharge Date and Time     Expected Discharge Date Expected Discharge Time    Dec 31, 2022             Krystle Amado RN

## 2022-12-28 NOTE — THERAPY TREATMENT NOTE
Patient Name: Stone Villeda  : 1975    MRN: [id number]                              Today's Date: 2022       Admit Date: 2022    Visit Dx:     ICD-10-CM ICD-9-CM   1. 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 pectoris (HCC)  I25.119 414.01     413.9     Patient Active Problem List   Diagnosis   • Other chest pain   • Essential hypertension   • Hyperlipidemia   • Smoking   • Family history of early CAD   • Shortness of breath   • Palpitations   • DM (diabetes mellitus), type 2 (HCC)   • PVC (premature ventricular contraction)   • NSVT (nonsustained ventricular tachycardia)   • Grade I diastolic dysfunction   • Coronary artery disease of native artery of native heart with stable angina pectoris (HCC)   • Dizziness   • Abnormal stress test   • Peripheral edema   • Ischemic cardiomyopathy   • 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 pectoris (HCC)   • Hyponatremia   • Acute respiratory failure with hypoxia (HCC)     Past Medical History:   Diagnosis Date   • Coronary artery disease    • COVID-19 02/15/2022   • Diabetes mellitus (HCC)    • Hyperlipidemia    • Hypertension    • Sleep apnea     NO CPAP     Past Surgical History:   Procedure Laterality Date   • CARDIAC CATHETERIZATION      X2; NO INTERVENTION   • CHOLECYSTECTOMY     • CORONARY ARTERY BYPASS GRAFT N/A 2022    Procedure: MEDIAN STERNOTOMY CORONARY ARTERY BYPASS GRAFTING X 2  UTILIZING THE LEFT INTERNAL MAMMERY GRAFT WITH EVH OF THE GREATER RIGHT SAPHENOUS VEIN;  Surgeon: Norris Walker MD;  Location: [facility];  Service: Cardiothoracic;  Laterality: N/A;   • HAND SURGERY Right    • WISDOM TOOTH EXTRACTION        General Information     Row Name 22 1301          Physical Therapy Time and Intention    Document Type therapy note (daily note)  -     Mode of Treatment physical therapy  -     Row Name 22 1305          General  Information    Patient Profile Reviewed yes  -     Existing Precautions/Restrictions cardiac;oxygen therapy device and L/min;sternal  -HM     Barriers to Rehab none identified  -     Row Name 12/28/22 1305          Cognition    Orientation Status (Cognition) oriented x 4  -     Row Name 12/28/22 1305          Safety Issues, Functional Mobility    Safety Issues Affecting Function (Mobility) safety precaution awareness;safety precautions follow-through/compliance  -     Impairments Affecting Function (Mobility) endurance/activity tolerance;shortness of breath  -           User Key  (r) = Recorded By, (t) = Taken By, (c) = Cosigned By    Initials Name Provider Type     Hue Walker, MICHAEL Physical Therapist               Mobility     Row Name 12/28/22 1312          Sit-Stand Transfer    Sit-Stand [place] (Transfers) 1 person assist;supervision  -     Comment, (Sit-Stand Transfer) intermittent cues for sternal precautions  -     Row Name 12/28/22 1312          Gait/Stairs (Locomotion)    [place] Level (Gait) supervision  -     Distance in Feet (Gait) 500  -HM     Deviations/Abnormal Patterns (Gait) bilateral deviations;base of support, wide  -HM     Bilateral Gait Deviations forward flexed posture  -     [place] Level (Stairs) contact guard  -     Number of Steps (Stairs) 3  -HM     Ascending Technique (Stairs) step-over-step  -HM     Descending Technique (Stairs) step-to-step  -     Comment, (Gait/Stairs) pt educated on step to step pattern for energy conservation. Pt improving with upright posture at this date and safety awareness  -           User Key  (r) = Recorded By, (t) = Taken By, (c) = Cosigned By    Initials Name Provider Type     Hue Walker PT Physical Therapist               Obj/Interventions     Row Name 12/28/22 1315          Motor Skills    Therapeutic Exercise other (see comments)  BLE LAQ, seated marches, APs, hip ab/ad, SLR x 10 reps  -     Row Name  12/28/22 1315          Balance    Balance Assessment sitting static balance;sitting dynamic balance;sit to stand dynamic balance;standing static balance;standing dynamic balance  -     Static Sitting Balance independent  -     Dynamic Sitting Balance independent  -     Position, Sitting Balance sitting in chair  -     Sit to Stand Dynamic Balance supervision  -     Static Standing Balance supervision  -     Dynamic Standing Balance contact guard  -     Position/Device Used, Standing Balance unsupported  -           User Key  (r) = Recorded By, (t) = Taken By, (c) = Cosigned By    Initials Name Provider Type     Hue Walker, PT Physical Therapist               Goals/Plan    No documentation.                Clinical Impression     Row Name 12/28/22 1316          Pain    Pretreatment Pain Rating 0/10 - no pain  -     Posttreatment Pain Rating 0/10 - no pain  -     Row Name 12/28/22 1316          Plan of Care Review    Plan of Care Reviewed With patient  -     Progress improving  -     Outcome Evaluation Pt ambulated 500 feet supervision with no AD and ascended/descended 3 stairs CGA with cueing for step to step pattern. Pt improving with gait mechanics and safety awareness at this date. Will continue to progress IPPT POC as tolerated.  -     Row Name 12/28/22 1316          Therapy Assessment/Plan (PT)    Rehab Potential (PT) good, to achieve stated therapy goals  -     Criteria for Skilled Interventions Met (PT) yes;skilled treatment is necessary  -     Therapy Frequency (PT) daily  -     Row Name 12/28/22 1316          Vital Signs    Pre Systolic BP Rehab 138  -HM     Pre Treatment Diastolic BP 82  -HM     Post Systolic BP Rehab 126  -HM     Post Treatment Diastolic BP 80  -HM     Pretreatment Heart Rate (beats/min) 90  -HM     Posttreatment Heart Rate (beats/min) 93  -HM     Pre SpO2 (%) 94  -HM     O2 Delivery Pre Treatment nasal cannula  -     O2 Delivery Intra Treatment  nasal cannula  -HM     Post SpO2 (%) 94  -HM     O2 Delivery Post Treatment nasal cannula  -HM     Pre Patient Position Sitting  -     Intra Patient Position Standing  -HM     Post Patient Position Sitting  -     Row Name 12/28/22 1316          Positioning and Restraints    Pre-Treatment Position sitting in chair/recliner  -HM     Post Treatment Position chair  -HM     In Chair notified nsg;reclined;sitting;call light within reach;encouraged to call for assist;LUE elevated;RUE elevated;waffle cushion  -           User Key  (r) = Recorded By, (t) = Taken By, (c) = Cosigned By    Initials Name Provider Type    Hue Phelan, MICHAEL Physical Therapist               Outcome Measures     Row Name 12/28/22 1318 12/28/22 0800       How much help from another person do you currently need...    Turning from your back to your side while in flat bed without using bedrails? 3  - 3  -DANIKA    Moving from lying on back to sitting on the side of a flat bed without bedrails? 3  - 3  -DANIKA    Moving to and from a bed to a chair (including a wheelchair)? 4  - 3  -DANIKA    Standing up from a chair using your arms (e.g., wheelchair, bedside chair)? 4  - 4  -DANIKA    Climbing 3-5 steps with a railing? 3  - 3  -DANIKA    To walk in hospital room? 4  - 4  -DNAIKA    AM-PAC 6 Clicks Score (PT) 21  - 20  -DANIKA    Highest level of mobility 6 --> Walked 10 steps or more  - 6 --> Walked 10 steps or more  -DANIKA    Row Name 12/28/22 1318          Functional Assessment    Outcome Measure Options AM-PAC 6 Clicks Basic Mobility (PT)  -           User Key  (r) = Recorded By, (t) = Taken By, (c) = Cosigned By    Initials Name Provider Type    Kylah Ball RN Registered Nurse     Hue Walker, MICHAEL Physical Therapist                             Physical Therapy Education     Title: PT OT SLP Therapies (Done)     Topic: Physical Therapy (Done)     Point: Mobility training (Done)     Learning Progress Summary           Patient  Acceptance, E,TB, VU,NR by  at 12/28/2022 1319    Acceptance, E,TB, VU,NR by  at 12/27/2022 1021    Comment: reviewed sternal precautions throughout session and with exercises    Acceptance, E, VU by AE at 12/26/2022 0910    Acceptance, E, VU,NR by AGGIE at 12/24/2022 1146                   Point: Home exercise program (Done)     Learning Progress Summary           Patient Acceptance, E,TB, VU,NR by  at 12/28/2022 1319    Acceptance, E,TB, VU,NR by  at 12/27/2022 1021    Comment: reviewed sternal precautions throughout session and with exercises                   Point: Body mechanics (Done)     Learning Progress Summary           Patient Acceptance, E,TB, VU,NR by  at 12/28/2022 1319    Acceptance, E,TB, VU,NR by  at 12/27/2022 1021    Comment: reviewed sternal precautions throughout session and with exercises    Acceptance, E, VU by AE at 12/26/2022 0910    Acceptance, E, VU,NR by AGGIE at 12/24/2022 1146                   Point: Precautions (Done)     Learning Progress Summary           Patient Acceptance, E,TB, VU,NR by  at 12/28/2022 1319    Acceptance, E,TB, VU,NR by  at 12/27/2022 1021    Comment: reviewed sternal precautions throughout session and with exercises    Acceptance, E, VU by AE at 12/26/2022 0910    Acceptance, E, VU,NR by AGGIE at 12/24/2022 1146                               User Key     Initials Effective Dates Name Provider Type Discipline     06/16/21 -  Danita Villanueva, PT Physical Therapist PT    AE 09/21/21 -  Rudolph Morales, PT Physical Therapist PT     09/22/22 -  Hue Walker, PT Physical Therapist PT              PT Recommendation and Plan     Plan of Care Reviewed With: patient  Progress: improving  Outcome Evaluation: Pt ambulated 500 feet supervision with no AD and ascended/descended 3 stairs CGA with cueing for step to step pattern. Pt improving with gait mechanics and safety awareness at this date. Will continue to progress IPPT POC as tolerated.     Time  Calculation:    PT Charges     Row Name 12/28/22 1319             Time Calculation    Start Time 0835  -HM      PT Received On 12/28/22  -HM         Timed Charges    51912 - PT Therapeutic Exercise Minutes 10  -HM      26773 - PT Therapeutic Activity Minutes 15  -HM         Total Minutes    Timed Charges Total Minutes 25  -HM       Total Minutes 25  -HM            User Key  (r) = Recorded By, (t) = Taken By, (c) = Cosigned By    Initials Name Provider Type     Hue Walker, PT Physical Therapist              Therapy Charges for Today     Code Description Service Date Service Provider Modifiers Qty    67771763473 HC PT THER PROC EA 15 MIN 12/27/2022 Hue Walker, PT GP 1    90985747931 HC PT THERAPEUTIC ACT EA 15 MIN 12/27/2022 Hue Walker, PT GP 1    55154891998 HC PT THER PROC EA 15 MIN 12/28/2022 Hue Walker, PT GP 1    17561351644 HC PT THERAPEUTIC ACT EA 15 MIN 12/28/2022 Hue Walker, PT GP 1          PT G-Codes  Outcome Measure Options: AM-PAC 6 Clicks Basic Mobility (PT)  AM-PAC 6 Clicks Score (PT): 21  PT Discharge Summary  Anticipated Discharge Disposition (PT): home with assist    Hue Walker PT  12/28/2022

## 2022-12-28 NOTE — PROGRESS NOTES
[facility] Cardiology  Inpatient Progress Note      Chief Complaint/Reason for consult:  CAD s/p CABG         Subjective  Feeling well today, denies dyspnea, sternotomy pain well controlled.  Did stairs with physical therapy earlier today    Review of Systems:   Negative for chest pain, shortness of breath, palpitations, fevers, chills, abdominal pain, nausea, vomiting       Vital Sign Min/Max for last 24 hours  Temp  Min: 97.6 °F (36.4 °C)  Max: 98.6 °F (37 °C)   BP  Min: 109/73  Max: 154/87   Pulse  Min: 68  Max: 97   Resp  Min: 16  Max: 26   SpO2  Min: 93 %  Max: 98 %   Flow (L/min)  Min: 2  Max: 4      Intake/Output Summary (Last 24 hours) at 12/28/2022 0830  Last data filed at 12/28/2022 0600  Gross per 24 hour   Intake 1410 ml   Output 3010 ml   Net -1600 ml           Gen: well developed, sitting up in bedside chair, comfortable appearing  HEENT: MMM, sclerae anicteric, conjunctivae normal, and IJ central line  CV: regular rate, regular rhythm, no murmurs or rubs, normal S1, S2. 2+ radial and DP pulses  Pulm: Is a cannula oxygen, normal work of breathing, no wheezes, rales, rhonchi  Abd: soft, nontender, nondistended,  positive bowel sounds  Ext: normal bulk for age, normal tone, no dependent edema  Neuro: alert, oriented, face symmetrical, moving all extremities well  Psych: normal mood, appropriate affect    Tele:  SR, no arrhythmia alarms overnight    Results Review (reviewed the patient's recent labs in the electronic medical record):     XR Chest 1 View    Result Date: 12/27/2022  Support hardware projects unchanged. Bibasilar atelectasis persists. There is no new focal opacity. There is no distinct pneumothorax or significant effusion. Unchanged cardiac enlargement.  This report was finalized on 12/27/2022 8:10 AM by Landry Chicas.      Lab Results   Component Value Date    WBC 11.49 (H) 12/28/2022    HGB 13.4 12/28/2022    HCT 39.4 12/28/2022    MCV 86.2 12/28/2022      12/28/2022     Lab Results   Component Value Date    GLUCOSE 133 (H) 12/28/2022    CALCIUM 8.5 (L) 12/28/2022     (L) 12/28/2022    K 4.0 12/28/2022    CO2 23.0 12/28/2022    CL 92 (L) 12/28/2022    BUN 15 12/28/2022    CREATININE 0.55 (L) 12/28/2022    BCR 27.3 (H) 12/28/2022    ANIONGAP 9.0 12/28/2022 12/6 - Transthoracic Echo     Interpretation Summary  •  Left ventricular ejection fraction appears to be 61 - 65%.  •  Left ventricular wall thickness is consistent with moderate to severe concentric hypertrophy.  •  Left ventricular diastolic function is consistent with (grade II w/high LAP) pseudonormalization.    1.  LV size, function, and wall motion are normal.  Severe concentric left ventricular hypertrophy.  Visually estimated ejection fraction is greater than or equal to 65%.  Grade 2 diastolic dysfunction.  There is no echocardiographic or Doppler evidence of dynamic LV outflow tract obstruction.  The left atrium is mildly dilated.  Right heart chambers are normal.  No septal defect or intracavitary mass or thrombus.    2.  The mitral valve is morphologically and physiologically normal.  The aortic valve is tricuspid and normally configured with no aortic stenosis or significant aortic insufficiency.  There is physiologic TR from a morphologically normal valve.    3.  No pericardial or great vessel pathology.     Assessment       CAD s/p CABG   - post-op management per CTS   - aspirin 325, clopidogrel, rosuvastatin    Hypertension, acceptable control   - resumed home amlodipine, sacubitril-valsartan 49-51   - metoprolol tartrate 50mg BID    Hyponatremia - worsened overnight, now back up some   - following BMP, off diuretics for now   - Na tabs started 12/26    Dispo   - Will plan for cardiology follow-up after discharge with his regular cardi[place]  12/28/2022  08:30 EST

## 2022-12-28 NOTE — PLAN OF CARE
Goal Outcome Evaluation:  Plan of Care Reviewed With: patient        Progress: improving     Neuro- Patient remains AO x 4. Able to move all extremities without diff. Patient ambulated 550' this shift with stand-by assist.     Respiratory- Lung sounds remain diminished in the bases. O2 decreased to 2lpm via N/C this am. Denies any SOB or difficulty breathing at this time.     Cardiac- Remains in NSR. SBP in the 130's. Denies any CP at this time.     GI/- Patient continues to c/o nausea with 0 vomiting noted. Medicated with anti-emetics per MD orders. See eMAR for details. 0 BM noted this shift. Mcintosh cath patent and draining clear yellow urine. UOP this shift- 1000ml.     Other- 0 s/s of acute distress noted. Sodium remains low at 126. Will cont to follow.

## 2022-12-28 NOTE — PROGRESS NOTES
CTS Progress Note       LOS: 5 days   Patient Care Team:  Amita Crook APRN as PCP - General (Nurse Practitioner)    Chief Complaint: 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 pectoris (HCC)    Vital Signs:  Temp:  [97.6 °F (36.4 °C)-98.6 °F (37 °C)] 97.6 °F (36.4 °C)  Heart Rate:  [71-97] 71  Resp:  [16-26] 16  BP: (109-153)/(64-85) 138/85  Up in chair breathing unlabored  Physical Exam:       Results:   Results from last 7 days   Lab Units 12/28/22  0409   WBC 10*3/mm3 11.49*   HEMOGLOBIN g/dL 13.4   HEMATOCRIT % 39.4   PLATELETS 10*3/mm3 250     Results from last 7 days   Lab Units 12/28/22  0531 12/28/22  0409   SODIUM mmol/L 126* 124*  124*   POTASSIUM mmol/L  --  4.0   CHLORIDE mmol/L  --  92*   CO2 mmol/L  --  23.0   BUN mg/dL  --  15   CREATININE mg/dL  --  0.55*   GLUCOSE mg/dL  --  133*   CALCIUM mg/dL  --  8.5*           Imaging Results (Last 24 Hours)     Procedure Component Value Units Date/Time    XR Chest 1 View [[id number]] Collected: 12/27/22 0809     Updated: 12/27/22 0813    Narrative:      DATE OF EXAM: 12/27/2022 4:39 AM     PROCEDURE: XR CHEST 1 VW-     INDICATIONS: postop; I25.119-Atherosclerotic heart disease of native  coronary artery with unspecified angina pectoris     COMPARISON: One day prior     TECHNIQUE: Single radiographic AP view of the chest was obtained.     FINDINGS:  Support hardware projects unchanged. Bibasilar atelectasis persists.  There is no new focal opacity. There is no distinct pneumothorax or  significant effusion. Unchanged cardiac enlargement.        Impression:      Support hardware projects unchanged. Bibasilar atelectasis persists.  There is no new focal opacity. There is no distinct pneumothorax or  significant effusion. Unchanged cardiac enlargement.      This report was finalized on 12/27/2022 8:10 AM by Landry Chicas.             Assessment      Coronary artery disease involving native coronary artery of  native heart with angina pectoris (HCC)    Essential hypertension    DM (diabetes mellitus), type 2 (HCC)    NSVT (nonsustained ventricular tachycardia)    Coronary artery disease of native artery of native heart with stable angina pectoris (HCC)    Hyponatremia    Acute respiratory failure with hypoxia (HCC)        Plan   Bumex 2 mg IV x1 now  Give an additional 150 mg Plavix now in addition to  Transfer to telemetry    Please note that portions of this note were completed with a voice recognition program. Efforts were made to edit the dictations, but occasionally words are mistranscribed.    Norris Walker MD  12/28/22  06:53 EST

## 2022-12-29 LAB
ANION GAP SERPL CALCULATED.3IONS-SCNC: 11 MMOL/L (ref 5–15)
BUN SERPL-MCNC: 17 MG/DL (ref 6–20)
BUN/CREAT SERPL: 29.8 (ref 7–25)
CALCIUM SPEC-SCNC: 8.2 MG/DL (ref 8.6–10.5)
CHLORIDE SERPL-SCNC: 97 MMOL/L (ref 98–107)
CO2 SERPL-SCNC: 22 MMOL/L (ref 22–29)
CREAT SERPL-MCNC: 0.57 MG/DL (ref 0.76–1.27)
EGFRCR SERPLBLD CKD-EPI 2021: 122.4 ML/MIN/1.73
GLUCOSE BLDC GLUCOMTR-MCNC: 105 MG/DL (ref 70–130)
GLUCOSE BLDC GLUCOMTR-MCNC: 107 MG/DL (ref 70–130)
GLUCOSE BLDC GLUCOMTR-MCNC: 116 MG/DL (ref 70–130)
GLUCOSE BLDC GLUCOMTR-MCNC: 99 MG/DL (ref 70–130)
GLUCOSE SERPL-MCNC: 116 MG/DL (ref 65–99)
HCT VFR BLD AUTO: 38.3 % (ref 37.5–51)
HGB BLD-MCNC: 13.3 G/DL (ref 13–17.7)
PA ADP PRP-ACNC: 141 PRU
POTASSIUM SERPL-SCNC: 4.4 MMOL/L (ref 3.5–5.2)
SODIUM SERPL-SCNC: 130 MMOL/L (ref 136–145)
SODIUM SERPL-SCNC: 130 MMOL/L (ref 136–145)

## 2022-12-29 PROCEDURE — 99232 SBSQ HOSP IP/OBS MODERATE 35: CPT | Performed by: INTERNAL MEDICINE

## 2022-12-29 PROCEDURE — 97530 THERAPEUTIC ACTIVITIES: CPT

## 2022-12-29 PROCEDURE — 85576 BLOOD PLATELET AGGREGATION: CPT | Performed by: STUDENT IN AN ORGANIZED HEALTH CARE EDUCATION/TRAINING PROGRAM

## 2022-12-29 PROCEDURE — 99252 IP/OBS CONSLTJ NEW/EST SF 35: CPT | Performed by: NURSE PRACTITIONER

## 2022-12-29 PROCEDURE — 80048 BASIC METABOLIC PNL TOTAL CA: CPT | Performed by: STUDENT IN AN ORGANIZED HEALTH CARE EDUCATION/TRAINING PROGRAM

## 2022-12-29 PROCEDURE — 84295 ASSAY OF SERUM SODIUM: CPT | Performed by: INTERNAL MEDICINE

## 2022-12-29 PROCEDURE — 85014 HEMATOCRIT: CPT | Performed by: STUDENT IN AN ORGANIZED HEALTH CARE EDUCATION/TRAINING PROGRAM

## 2022-12-29 PROCEDURE — 82962 GLUCOSE BLOOD TEST: CPT

## 2022-12-29 PROCEDURE — 99024 POSTOP FOLLOW-UP VISIT: CPT | Performed by: THORACIC SURGERY (CARDIOTHORACIC VASCULAR SURGERY)

## 2022-12-29 PROCEDURE — 85018 HEMOGLOBIN: CPT | Performed by: STUDENT IN AN ORGANIZED HEALTH CARE EDUCATION/TRAINING PROGRAM

## 2022-12-29 PROCEDURE — 25010000002 HEPARIN (PORCINE) PER 1000 UNITS: Performed by: STUDENT IN AN ORGANIZED HEALTH CARE EDUCATION/TRAINING PROGRAM

## 2022-12-29 RX ADMIN — ACETAMINOPHEN 650 MG: 325 TABLET ORAL at 13:13

## 2022-12-29 RX ADMIN — Medication 10 ML: at 05:15

## 2022-12-29 RX ADMIN — Medication 10 ML: at 21:44

## 2022-12-29 RX ADMIN — HEPARIN SODIUM 5000 UNITS: 5000 INJECTION INTRAVENOUS; SUBCUTANEOUS at 05:14

## 2022-12-29 RX ADMIN — SACUBITRIL AND VALSARTAN 1 TABLET: 49; 51 TABLET, FILM COATED ORAL at 21:44

## 2022-12-29 RX ADMIN — Medication 10 ML: at 08:54

## 2022-12-29 RX ADMIN — ACETAMINOPHEN 650 MG: 325 TABLET ORAL at 05:14

## 2022-12-29 RX ADMIN — HEPARIN SODIUM 5000 UNITS: 5000 INJECTION INTRAVENOUS; SUBCUTANEOUS at 13:11

## 2022-12-29 RX ADMIN — ASPIRIN 325 MG: 325 TABLET, COATED ORAL at 08:53

## 2022-12-29 RX ADMIN — AMLODIPINE BESYLATE 10 MG: 10 TABLET ORAL at 08:52

## 2022-12-29 RX ADMIN — CLOPIDOGREL BISULFATE 75 MG: 75 TABLET ORAL at 08:53

## 2022-12-29 RX ADMIN — HEPARIN SODIUM 5000 UNITS: 5000 INJECTION INTRAVENOUS; SUBCUTANEOUS at 21:44

## 2022-12-29 RX ADMIN — ACETAMINOPHEN 650 MG: 325 TABLET ORAL at 21:44

## 2022-12-29 RX ADMIN — SODIUM CHLORIDE 2 G: 1 TABLET ORAL at 13:12

## 2022-12-29 RX ADMIN — METOPROLOL TARTRATE 50 MG: 50 TABLET, FILM COATED ORAL at 08:53

## 2022-12-29 RX ADMIN — METOPROLOL TARTRATE 50 MG: 50 TABLET, FILM COATED ORAL at 21:44

## 2022-12-29 RX ADMIN — SODIUM CHLORIDE 2 G: 1 TABLET ORAL at 08:53

## 2022-12-29 RX ADMIN — SODIUM CHLORIDE 2 G: 1 TABLET ORAL at 17:25

## 2022-12-29 RX ADMIN — Medication 10 ML: at 13:14

## 2022-12-29 RX ADMIN — SACUBITRIL AND VALSARTAN 1 TABLET: 49; 51 TABLET, FILM COATED ORAL at 08:53

## 2022-12-29 RX ADMIN — ROSUVASTATIN 20 MG: 20 TABLET, FILM COATED ORAL at 21:44

## 2022-12-29 NOTE — PLAN OF CARE
Goal Outcome Evaluation:      Pt A&O, NSR, room air. Multiple walks around unit this shift. No complaints of pain. Incisions C/D/I. Dressing changed. 1500 ml fluid restriction continued.

## 2022-12-29 NOTE — PROGRESS NOTES
Cardiothoracic Surgery Progress Note      POD # 6 s/p CABG x2     LOS: 6 days      Subjective:  On room air, VSS  No acute events    Objective:  Vital Signs  Temp:  [97.5 °F (36.4 °C)-99 °F (37.2 °C)] 98 °F (36.7 °C)  Heart Rate:  [] 84  Resp:  [17-20] 17  BP: (101-132)/(67-81) 121/77    Physical Exam:   General Appearance: alert, appears stated age and cooperative   Lungs: clear to auscultation, respirations regular, respirations even and respirations unlabored   Heart: regular rhythm & normal rate, normal S1, S2, no murmur, no gallop, no rub and no click   Skin: Incision c/d/i       Results:  Results from last 7 days   Lab Units 12/29/22  0707 12/28/22  0409   WBC 10*3/mm3  --  11.49*   HEMOGLOBIN g/dL 13.3 13.4   HEMATOCRIT % 38.3 39.4   PLATELETS 10*3/mm3  --  250     Results from last 7 days   Lab Units 12/28/22  2327 12/28/22  0531 12/28/22  0409   SODIUM mmol/L 132*   < > 124*  124*   POTASSIUM mmol/L  --   --  4.0   CHLORIDE mmol/L  --   --  92*   CO2 mmol/L  --   --  23.0   BUN mg/dL  --   --  15   CREATININE mg/dL  --   --  0.55*   GLUCOSE mg/dL  --   --  133*   CALCIUM mg/dL  --   --  8.5*    < > = values in this interval not displayed.       Assessment:  POD # 6 s/p CABG x2    Plan:  Hyponatremia management per hospitalist  Ambulate  Pulmonary toilet      Sharron Enriquez PA-C  12/29/22  08:18 EST

## 2022-12-29 NOTE — CONSULTS
[facility] Medicine Services  CONSULT NOTE      Patient Name: Stone Villeda  : 1975  MRN: [id number]    Primary Care Physician: Amita Crook APRN  Provider requesting consultation: Norris Walker MD    Subjective   Subjective     Reason for Consultation:  Post-op Medical management    HPI:  Stone Villeda is a 47 y.o. male with a history of HTN, HLD, T2DM, VT ischemic cardiomyopathy, CAD, and ongoing tobacco use.  He underwent a CABG x2 by Dr. Walker on 2022.  He was admitted to the ICU for postop management.  Cardiology has been following and he was placed on amiodarone infusion as prophylaxis due to his history of VT.  Patient's hospital course has been complicated by ongoing respiratory failure and requirement of high flow oxygen as well as hyponatremia.  Hospital medicine has been consulted to follow in the postop period now that he is out of the ICU.      Review of Systems   Constitutional: Negative for chills and fever.   HENT: Negative.    Eyes: Negative.    Respiratory: Negative for chest tightness and shortness of breath.    Cardiovascular: Negative for chest pain, palpitations and leg swelling.   Gastrointestinal: Negative for abdominal pain, nausea and vomiting.   Genitourinary: Negative.    Musculoskeletal: Negative.    Skin: Negative.    Neurological: Negative.    Psychiatric/Behavioral: Negative.        All other systems reviewed and are negative.     Personal History     Past Medical History:   Diagnosis Date   • Coronary artery disease    • COVID-19 02/15/2022   • Diabetes mellitus (HCC)    • Hyperlipidemia    • Hypertension    • Sleep apnea     NO CPAP       Past Surgical History:   Procedure Laterality Date   • CARDIAC CATHETERIZATION      X2; NO INTERVENTION   • CHOLECYSTECTOMY     • CORONARY ARTERY BYPASS GRAFT N/A 2022    Procedure: MEDIAN STERNOTOMY CORONARY ARTERY BYPASS GRAFTING X 2  UTILIZING THE LEFT INTERNAL MAMMERY GRAFT WITH EVH  OF THE GREATER RIGHT SAPHENOUS VEIN;  Surgeon: Norris Walker MD;  Location: [facility];  Service: Cardiothoracic;  Laterality: N/A;   • HAND SURGERY Right    • WISDOM TOOTH EXTRACTION       Family History:  family history includes Breast cancer in his mother; Heart attack in his brother and father; Hypertension in his father. Otherwise pertinent FHx was reviewed and unremarkable.     Social History:  reports that he has been smoking cigarettes. He has a 30.00 pack-year smoking history. He has never used smokeless tobacco. He reports that he does not currently use drugs. He reports that he does not drink alcohol.    Medications:  albuterol sulfate HFA, amLODIPine, aspirin, atenolol, cloNIDine, clopidogrel, isosorbide mononitrate, metFORMIN ER, nitroglycerin, rosuvastatin, and sacubitril-valsartan    Scheduled Meds:acetaminophen, 650 mg, Oral, Q8H  amLODIPine, 10 mg, Oral, Q24H  aspirin, 325 mg, Oral, Daily  clopidogrel, 75 mg, Oral, Daily  heparin (porcine), 5,000 Units, Subcutaneous, Q8H  insulin lispro, 0-7 Units, Subcutaneous, 4x Daily With Meals & Nightly  metoprolol tartrate, 50 mg, Oral, Q12H  pharmacy consult - MTM, , Does not apply, Daily  rosuvastatin, 20 mg, Oral, Nightly  sacubitril-valsartan, 1 tablet, Oral, Q12H  senna-docusate sodium, 2 tablet, Oral, BID  sodium chloride, 10 mL, Intravenous, Q12H  sodium chloride, 10 mL, Intravenous, Q8H  sodium chloride, 2 g, Oral, TID With Meals      Continuous Infusions:   PRN Meds:.•  bisacodyl  •  bisacodyl  •  dextrose  •  dextrose  •  docusate sodium  •  glucagon (human recombinant)  •  HYDROcodone-acetaminophen  •  magnesium hydroxide  •  magnesium sulfate **OR** magnesium sulfate **OR** magnesium sulfate  •  Morphine  •  ondansetron  •  oxyCODONE  •  polyethylene glycol  •  potassium chloride **OR** potassium chloride  •  senna-docusate sodium    No Known Allergies    Objective   Objective     Vital Signs:   Temp:  [97.8 °F (36.6 °C)-99 °F (37.2 °C)] 97.9  °F (36.6 °C)  Heart Rate:  [] 76  Resp:  [16-20] 16  BP: (115-135)/(73-81) 117/73    Physical Exam  Constitutional: No acute distress, awake, alert, resting upright in bed, no family at bedside  HENT: NCAT, mucous membranes moist  Respiratory: Clear to auscultation bilaterally, respiratory effort normal on room air  Cardiovascular: RRR, no murmurs, rubs, or gallops  Gastrointestinal: Positive bowel sounds, soft, nontender, nondistended  Musculoskeletal: No bilateral ankle edema  Psychiatric: Appropriate affect, cooperative  Neurologic: Oriented x 3, strength symmetric in all extremities, Cranial Nerves grossly intact to confrontation, speech clear  Skin: No rashes, midsternal incision intact         Result Review:  I have personally reviewed the results from the time of admission and agree with these findings:  [x]  Laboratory  [x]  Radiology  [x]  EKG/Telemetry   []  Pathology  []  Old records  []  Other:  Most notable findings include:     Latest Reference Range & Units 12/19/22 08:36   Hemoglobin A1C 4.80 - 5.60 % 6.10 (H)       LAB RESULTS:      Lab 12/29/22  0707 12/28/22  0409 12/26/22  0653 12/25/22  0505 12/24/22  0329 12/23/22  1518 12/23/22  1122   WBC  --  11.49* 11.59* 14.01* 12.22* 13.63* 18.08*   HEMOGLOBIN 13.3 13.4 11.9* 11.4* 11.9* 12.0* 14.0   HEMATOCRIT 38.3 39.4 34.8* 33.3* 34.7* 34.8* 41.5   PLATELETS  --  250 180 182 181 182 207   NEUTROS ABS  --   --   --   --  9.82*  --   --    IMMATURE GRANS (ABS)  --   --   --   --  0.07*  --   --    LYMPHS ABS  --   --   --   --  1.29  --   --    MONOS ABS  --   --   --   --  0.95*  --   --    EOS ABS  --   --   --   --  0.07  --   --    MCV  --  86.2 86.1 85.4 88.1 87.4 87.9   PROTIME  --   --   --   --  14.0  --  14.8*   APTT  --   --   --   --   --   --  31.9         Lab 12/29/22  0707 12/28/22  2327 12/28/22  1635 12/28/22  0531 12/28/22  0409 12/27/22  1025 12/27/22  0354 12/26/22  1402 12/26/22  0453 12/25/22  2054 12/25/22  0505 12/24/22  0329  12/23/22  1518 12/23/22  1122   SODIUM 130* 132* 127* 126* 124*  124*   < > 127*  127*   < > 123* 123*   < > 133* 138 136   POTASSIUM 4.4  --   --   --  4.0  --  4.3  --  4.3 3.7   < > 4.2 3.9 4.8   CHLORIDE 97*  --   --   --  92*  --  92*  --  90* 90*   < > 102 106 104   CO2 22.0  --   --   --  23.0  --  24.0  --  26.0 25.0   < > 22.0 23.0 24.0   ANION GAP 11.0  --   --   --  9.0  --  11.0  --  7.0 8.0   < > 9.0 9.0 8.0   BUN 17  --   --   --  15  --  13  --  11 12   < > 12 8 7   CREATININE 0.57*  --   --   --  0.55*  --  0.56*  --  0.56* 0.67*   < > 0.84 0.77 0.79   EGFR 122.4  --   --   --  123.8  --  123.1  --  123.1 116.6   < > 108.9 111.8 111.0   GLUCOSE 116*  --   --   --  133*  --  128*  --  119* 128*   < > 156* 170* 179*   CALCIUM 8.2*  --   --   --  8.5*  --  8.4*  --  8.3* 8.4*   < > 8.3* 8.1* 9.1   IONIZED CALCIUM  --   --   --   --   --   --   --   --   --   --   --   --   --  1.34*   MAGNESIUM  --   --   --   --   --   --   --   --   --   --   --  2.4 2.5 1.9   PHOSPHORUS  --   --   --   --   --   --   --   --   --   --   --  3.4 3.7 4.3    < > = values in this interval not displayed.         Lab 12/28/22  0409 12/24/22  0329 12/23/22  1518 12/23/22  1122   TOTAL PROTEIN 6.3  --   --   --    ALBUMIN 3.3* 3.90 4.00 3.90   GLOBULIN 3.0  --   --   --    ALT (SGPT) 15  --   --   --    AST (SGOT) 18  --   --   --    BILIRUBIN 0.3  --   --   --    ALK PHOS 70  --   --   --          Lab 12/24/22  0329 12/23/22  1122   PROTIME 14.0 14.8*   INR 1.08 1.17*             Lab 12/23/22  0600   ABO TYPING O   RH TYPING Positive   ANTIBODY SCREEN Negative         Lab 12/25/22  1743 12/23/22  1631 12/23/22  1121   PH, ARTERIAL 7.496* 7.332* 7.330*   PCO2, ARTERIAL 33.1* 45.4* 47.7*   PO2 ART 51.8* 68.6* 121.0*   FIO2 100 40 100   HCO3 ART 25.6 24.0 25.1   BASE EXCESS ART 2.7* -2.1* -1.4*   CARBOXYHEMOGLOBIN 0.7 1.3 2.2*     Brief Urine Lab Results  (Last result in the past 365 days)      Color   Clarity   Blood   Leuk  Est   Nitrite   Protein   CREAT   Urine HCG        12/25/22 1057             30.0             Microbiology Results (last 10 days)     ** No results found for the last 240 hours. **          No radiology results from the last 24 hrs    Results for orders placed during the hospital encounter of 12/06/22    Adult Transthoracic Echo Complete W/ Cont if Necessary Per Protocol    Interpretation Summary  •  Left ventricular ejection fraction appears to be 61 - 65%.  •  Left ventricular wall thickness is consistent with moderate to severe concentric hypertrophy.  •  Left ventricular diastolic function is consistent with (grade II w/high LAP) pseudonormalization.    Very good technical quality.    1.  LV size, function, and wall motion are normal.  Severe concentric left ventricular hypertrophy.  Visually estimated ejection fraction is greater than or equal to 65%.  Grade 2 diastolic dysfunction.  There is no echocardiographic or Doppler evidence of dynamic LV outflow tract obstruction.  The left atrium is mildly dilated.  Right heart chambers are normal.  No septal defect or intracavitary mass or thrombus.    2.  The mitral valve is morphologically and physiologically normal.  The aortic valve is tricuspid and normally configured with no aortic stenosis or significant aortic insufficiency.  There is physiologic TR from a morphologically normal valve.    3.  No pericardial or great vessel pathology.    4.  Pulmonary artery pressures cannot be calculated.      Assessment & Plan   Assessment & Plan     Active Hospital Problems    Diagnosis  POA   • **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 pectoris ([facility]) [I25.119]  Yes   • Hyponatremia [E87.1]  No   • Acute respiratory failure with hypoxia ([facility]) [J96.01]  No   • Coronary artery disease of native artery of native heart with stable angina pectoris ([facility]) [I25.118]  Yes   • NSVT (nonsustained ventricular tachycardia) [I47.29]  Yes   •  DM (diabetes mellitus), type 2 (HCC) [E11.9]  Yes   • Essential hypertension [I10]  Yes      Resolved Hospital Problems   No resolved problems to display.       Coronary artery disease  -Status post CABG x2 by Dr. Walker on 12/23  -Further management per CT surgery  -Ambulated 500 feet with PT.    Type 2 diabetes  -A1c 6.1%  -On metformin 500 mg twice daily at home.  Can likely resume at discharge.  -Glucose is currently well controlled.  Continue current regimen.    Hyponatremia-improving  -Was placed on salt tabs 2 g 3 times daily in the ICU and fluid restriction.  -Improving.  Continue sodium checks every 8 hours.  -Sodium last check was 130  -will likely be okay to discharge home soon as long as sodium levels remain stable.    -recommend having PCP redraw labs within the next week to assess sodium.     Chronic HFpEF  -Followed by Dr. Mcneal with cardiology.  -On Entresto.  Appears to be tolerating well.    Acute respiratory failure with hypoxia  -Previously required high flow oxygen.  He is now doing well on room air.    Thank you for allowing [facility] Medicine Service to provide consultative care for your patient, we will continue to follow while clinically appropriate.    Minnie Bahena, APRN  12/29/22

## 2022-12-29 NOTE — THERAPY DISCHARGE NOTE
Patient Name: Stone Villeda  : 1975    MRN: [id number]                              Today's Date: 2022       Admit Date: 2022    Visit Dx:     ICD-10-CM ICD-9-CM   1. 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 pectoris (HCC)  I25.119 414.01     413.9     Patient Active Problem List   Diagnosis   • Other chest pain   • Essential hypertension   • Hyperlipidemia   • Smoking   • Family history of early CAD   • Shortness of breath   • Palpitations   • DM (diabetes mellitus), type 2 (HCC)   • PVC (premature ventricular contraction)   • NSVT (nonsustained ventricular tachycardia)   • Grade I diastolic dysfunction   • Coronary artery disease of native artery of native heart with stable angina pectoris (HCC)   • Dizziness   • Abnormal stress test   • Peripheral edema   • Ischemic cardiomyopathy   • 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 pectoris (HCC)   • Hyponatremia   • Acute respiratory failure with hypoxia (HCC)     Past Medical History:   Diagnosis Date   • Coronary artery disease    • COVID-19 02/15/2022   • Diabetes mellitus (HCC)    • Hyperlipidemia    • Hypertension    • Sleep apnea     NO CPAP     Past Surgical History:   Procedure Laterality Date   • CARDIAC CATHETERIZATION      X2; NO INTERVENTION   • CHOLECYSTECTOMY     • CORONARY ARTERY BYPASS GRAFT N/A 2022    Procedure: MEDIAN STERNOTOMY CORONARY ARTERY BYPASS GRAFTING X 2  UTILIZING THE LEFT INTERNAL MAMMERY GRAFT WITH EVH OF THE GREATER RIGHT SAPHENOUS VEIN;  Surgeon: Norris Walker MD;  Location: [facility];  Service: Cardiothoracic;  Laterality: N/A;   • HAND SURGERY Right    • WISDOM TOOTH EXTRACTION        General Information     Row Name 22 1537          Physical Therapy Time and Intention    Document Type discharge treatment  -AE     Mode of Treatment physical therapy  -AE     Row Name 22 1537          General Information     Patient Profile Reviewed yes  -AE     Existing Precautions/Restrictions cardiac;sternal  -AE     Barriers to Rehab none identified  -AE     Row Name 12/29/22 1537          Cognition    Orientation Status (Cognition) oriented x 4  -AE     Row Name 12/29/22 1537          Safety Issues, Functional Mobility    Safety Issues Affecting Function (Mobility) insight into deficits/self-awareness;safety precaution awareness  -AE     Impairments Affecting Function (Mobility) endurance/activity tolerance;shortness of breath  -AE     Comment, Safety Issues/Impairments (Mobility) Pt demo decreased safety awareness, improved with cues.  -AE           User Key  (r) = Recorded By, (t) = Taken By, (c) = Cosigned By    Initials Name Provider Type    AE Rudolph Morales PT Physical Therapist               Mobility     Row Name 12/29/22 1538          Bed Mobility    Comment, (Bed Mobility) Pt received standing in room and left sitting EOB.  -AE     Row Name 12/29/22 1538          Transfers    Comment, (Transfers) Pt received standing in room, cues to maintain sternal precautions to sit at EOB.  -AE     Row Name 12/29/22 1538          Gait/Stairs (Locomotion)    [place] Level (Gait) independent  -AE     Distance in Feet (Gait) 700  -AE     Deviations/Abnormal Patterns (Gait) base of support, wide  -AE     Bilateral Gait Deviations forward flexed posture  -AE     Comment, (Gait/Stairs) Pt demo step through gait pattern with decreased safety awareness. Pt has been up ad yumiko and ambulating multiple laps around unit independently. Pt demo slight SOA at end of session but overall good mobility.  -AE           User Key  (r) = Recorded By, (t) = Taken By, (c) = Cosigned By    Initials Name Provider Type    AE Rudolph Morales PT Physical Therapist               Obj/Interventions     Row Name 12/29/22 1542          Balance    Balance Assessment sitting static balance;sitting dynamic balance;sit to stand dynamic balance;standing static  balance;standing dynamic balance  -AE     Static Sitting Balance independent  -AE     Dynamic Sitting Balance independent  -AE     Position, Sitting Balance unsupported;sitting edge of bed  -AE     Static Standing Balance independent  -AE     Dynamic Standing Balance independent  -AE     Position/Device Used, Standing Balance unsupported  -AE           User Key  (r) = Recorded By, (t) = Taken By, (c) = Cosigned By    Initials Name Provider Type    AE Rudolph Morales, PT Physical Therapist               Goals/Plan     Row Name 12/29/22 1544          Bed Mobility Goal 1 (PT)    Progress/Outcomes (Bed Mobility Goal 1, PT) goal met  -AE     Row Name 12/29/22 1544          Transfer Goal 1 (PT)    Progress/Outcome (Transfer Goal 1, PT) goal met  -AE     Row Name 12/29/22 1544          Gait Training Goal 1 (PT)    Progress/Outcome (Gait Training Goal 1, PT) goal met  -AE     Row Name 12/29/22 1544          Stairs Goal 1 (PT)    Progress/Outcome (Stairs Goal 1, PT) goal met  -AE           User Key  (r) = Recorded By, (t) = Taken By, (c) = Cosigned By    Initials Name Provider Type    AE Rudolph Morales, PT Physical Therapist               Clinical Impression     Row Name 12/29/22 1541          Pain    Pretreatment Pain Rating 0/10 - no pain  -AE     Posttreatment Pain Rating 0/10 - no pain  -AE     Row Name 12/29/22 1541          Plan of Care Review    Plan of Care Reviewed With patient  -AE     Progress improving  -AE     Outcome Evaluation Pt improved ambulation distance to 700ft this session independently. Pt demo slight SOA at end of session but reports being up ad yumiko and ambulating multiple laps around unit with no diffiuclty throughout the day. Pt demo good balance and endurance with mobility and reports at baseline function while meeting all PT goals, will sign off at this time. Recommend D/C home.  -AE     Row Name 12/29/22 1541          Vital Signs    Pre Systolic BP Rehab --  VSS  -AE     O2 Delivery Pre  Treatment room air  -AE     O2 Delivery Intra Treatment room air  -AE     O2 Delivery Post Treatment room air  -AE     Pre Patient Position Standing  -AE     Intra Patient Position Standing  -AE     Post Patient Position Sitting  -AE     Row Name 12/29/22 1541          Positioning and Restraints    Pre-Treatment Position standing in room  -AE     Post Treatment Position bed  -AE     In Bed notified nsg;sitting EOB;call light within reach;encouraged to call for assist;side rails up x2  -AE           User Key  (r) = Recorded By, (t) = Taken By, (c) = Cosigned By    Initials Name Provider Type    AE Rudolph Morales, PT Physical Therapist               Outcome Measures     Row Name 12/29/22 1545 12/29/22 0852       How much help from another person do you currently need...    Turning from your back to your side while in flat bed without using bedrails? 4  -AE 3  -AF    Moving from lying on back to sitting on the side of a flat bed without bedrails? 4  -AE 3  -AF    Moving to and from a bed to a chair (including a wheelchair)? 4  -AE 4  -AF    Standing up from a chair using your arms (e.g., wheelchair, bedside chair)? 4  -AE 4  -AF    Climbing 3-5 steps with a railing? 4  -AE 3  -AF    To walk in hospital room? 4  -AE 4  -AF    AM-PAC 6 Clicks Score (PT) 24  -AE 21  -AF    Highest level of mobility 8 --> Walked 250 feet or more  -AE 6 --> Walked 10 steps or more  -AF    Row Name 12/29/22 1545          Functional Assessment    Outcome Measure Options AM-PAC 6 Clicks Basic Mobility (PT)  -AE           User Key  (r) = Recorded By, (t) = Taken By, (c) = Cosigned By    Initials Name Provider Type    Rudolph Nj, PT Physical Therapist    AF Cinthia Ascencio, RN Registered Nurse              Physical Therapy Education     Title: PT OT SLP Therapies (Done)     Topic: Physical Therapy (Done)     Point: Mobility training (Done)     Learning Progress Summary           Patient Acceptance, E, VU by AE at 12/29/2022 7684     Acceptance, E,TB, VU,NR by  at 12/28/2022 1319    Acceptance, E,TB, VU,NR by  at 12/27/2022 1021    Comment: reviewed sternal precautions throughout session and with exercises    Acceptance, E, VU by AE at 12/26/2022 0910    Acceptance, E, VU,NR by AGGIE at 12/24/2022 1146                   Point: Home exercise program (Done)     Learning Progress Summary           Patient Acceptance, E, VU by AE at 12/29/2022 1505    Acceptance, E,TB, VU,NR by  at 12/28/2022 1319    Acceptance, E,TB, VU,NR by  at 12/27/2022 1021    Comment: reviewed sternal precautions throughout session and with exercises                   Point: Body mechanics (Done)     Learning Progress Summary           Patient Acceptance, E, VU by AE at 12/29/2022 1505    Acceptance, E,TB, VU,NR by  at 12/28/2022 1319    Acceptance, E,TB, VU,NR by  at 12/27/2022 1021    Comment: reviewed sternal precautions throughout session and with exercises    Acceptance, E, VU by AE at 12/26/2022 0910    Acceptance, E, VU,NR by AGGIE at 12/24/2022 1146                   Point: Precautions (Done)     Learning Progress Summary           Patient Acceptance, E, VU by AE at 12/29/2022 1505    Acceptance, E,TB, VU,NR by  at 12/28/2022 1319    Acceptance, E,TB, VU,NR by  at 12/27/2022 1021    Comment: reviewed sternal precautions throughout session and with exercises    Acceptance, E, VU by AE at 12/26/2022 0910    Acceptance, E, VU,NR by AGGIE at 12/24/2022 1146                               User Key     Initials Effective Dates Name Provider Type Discipline     06/16/21 -  Danita Villanueva, PT Physical Therapist PT    AE 09/21/21 -  Rudolph Morales, PT Physical Therapist PT     09/22/22 -  Hue Walker, PT Physical Therapist PT              PT Recommendation and Plan     Plan of Care Reviewed With: patient  Progress: improving  Outcome Evaluation: Pt improved ambulation distance to 700ft this session independently. Pt demo slight SOA at end of session but  reports being up ad yumiko and ambulating multiple laps around unit with no diffiuclty throughout the day. Pt demo good balance and endurance with mobility and reports at baseline function while meeting all PT goals, will sign off at this time. Recommend D/C home.     Time Calculation:    PT Charges     Row Name 12/29/22 1546             Time Calculation    Start Time 1505  -AE      PT Received On 12/29/22  -AE      PT Goal Re-Cert Due Date 01/03/23  -AE         Time Calculation- PT    Total Timed Code Minutes- PT 14 minute(s)  -AE         Timed Charges    15376 - PT Therapeutic Activity Minutes 14  -AE         Total Minutes    Timed Charges Total Minutes 14  -AE       Total Minutes 14  -AE            User Key  (r) = Recorded By, (t) = Taken By, (c) = Cosigned By    Initials Name Provider Type    AE Rudolph Morales PT Physical Therapist              Therapy Charges for Today     Code Description Service Date Service Provider Modifiers Qty    10891556438 HC PT THERAPEUTIC ACT EA 15 MIN 12/29/2022 Rudolph Morales PT GP 1          PT G-Codes  Outcome Measure Options: AM-PAC 6 Clicks Basic Mobility (PT)  AM-PAC 6 Clicks Score (PT): 24    PT Discharge Summary  Anticipated Discharge Disposition (PT): home    Rudolph Morales PT  12/29/2022

## 2022-12-29 NOTE — PLAN OF CARE
Goal Outcome Evaluation:  Plan of Care Reviewed With: patient           Outcome Evaluation: VSS on room air. Patient A/O x 4. Ambulated 1400 feet last night with no symptoms. Serial sodiums q8, last one improved at 132, continued 1500 mL fluid restriction. Pain well controlled with scheduled Tylenol. Patient c/o nausea, PRN Zofran given with improvement.

## 2022-12-29 NOTE — PROGRESS NOTES
[facility] Cardiology  Inpatient Progress Note      Chief Complaint/Reason for consult:  CAD s/p CABG         Subjective  Feeling well, walked around the echavarria multiple times overnight    Review of Systems:   Negative for chest pain, shortness of breath, palpitations, fevers, chills, abdominal pain, nausea, vomiting       Vital Sign Min/Max for last 24 hours  Temp  Min: 97.5 °F (36.4 °C)  Max: 99 °F (37.2 °C)   BP  Min: 101/72  Max: 135/74   Pulse  Min: 71  Max: 107   Resp  Min: 17  Max: 20   SpO2  Min: 91 %  Max: 97 %   Flow (L/min)  Min: 2  Max: 2      Intake/Output Summary (Last 24 hours) at 12/29/2022 0949  Last data filed at 12/29/2022 0516  Gross per 24 hour   Intake 1420 ml   Output 1645 ml   Net -225 ml           Gen: well developed, sitting up in bed , comfortable appearing  HEENT: MMM, sclerae anicteric, conjunctivae normal  CV: regular rate, regular rhythm, no murmurs or rubs, normal S1, S2  Pulm: Is a cannula oxygen, normal work of breathing, no wheezes, rales, rhonchi  Ext: normal bulk for age, normal tone, no dependent edema  Neuro: alert, oriented, face symmetrical, moving all extremities well  Psych: normal mood, appropriate affect    Tele:  SR, no arrhythmia alarms overnight    Results Review (reviewed the patient's recent labs in the electronic medical record):     No radiology results for the last day  Lab Results   Component Value Date    WBC 11.49 (H) 12/28/2022    HGB 13.3 12/29/2022    HCT 38.3 12/29/2022    MCV 86.2 12/28/2022     12/28/2022     Lab Results   Component Value Date    GLUCOSE 116 (H) 12/29/2022    CALCIUM 8.2 (L) 12/29/2022     (L) 12/29/2022    K 4.4 12/29/2022    CO2 22.0 12/29/2022    CL 97 (L) 12/29/2022    BUN 17 12/29/2022    CREATININE 0.57 (L) 12/29/2022    BCR 29.8 (H) 12/29/2022    ANIONGAP 11.0 12/29/2022 12/6 - Transthoracic Echo     Interpretation Summary  •  Left ventricular ejection fraction appears to be 61 - 65%.  •  Left  ventricular wall thickness is consistent with moderate to severe concentric hypertrophy.  •  Left ventricular diastolic function is consistent with (grade II w/high LAP) pseudonormalization.    1.  LV size, function, and wall motion are normal.  Severe concentric left ventricular hypertrophy.  Visually estimated ejection fraction is greater than or equal to 65%.  Grade 2 diastolic dysfunction.  There is no echocardiographic or Doppler evidence of dynamic LV outflow tract obstruction.  The left atrium is mildly dilated.  Right heart chambers are normal.  No septal defect or intracavitary mass or thrombus.    2.  The mitral valve is morphologically and physiologically normal.  The aortic valve is tricuspid and normally configured with no aortic stenosis or significant aortic insufficiency.  There is physiologic TR from a morphologically normal valve.    3.  No pericardial or great vessel pathology.     Assessment       CAD s/p CABG   - post-op management per CTS   - aspirin 325, clopidogrel, rosuvastatin    Hypertension, acceptable control   - resumed home amlodipine, sacubitril-valsartan 49-51   - metoprolol tartrate 50mg BID   - Bps acceptably controlled without home ISMN    Chronic HFpEF   - currently euvolemic   - sacubitril-valsartan as above    Hyponatremia - stable   - following BMP, off diuretics for now   - 12/25 low urine osmolality, urine Na 28mmol/L   - Na tabs started 12/26    Dispo   - Will plan for cardiology follow-up after discharge with his regular cardiologist [place]  12/29/2022  09:49 EST

## 2022-12-29 NOTE — PLAN OF CARE
Goal Outcome Evaluation:  Plan of Care Reviewed With: patient        Progress: improving  Outcome Evaluation: Pt improved ambulation distance to 700ft this session independently. Pt demo slight SOA at end of session but reports being up ad yumiko and ambulating multiple laps around unit with no diffiuclty throughout the day. Pt demo good balance and endurance with mobility and reports at baseline function while meeting all PT goals, will sign off at this time. Recommend D/C home.

## 2022-12-30 ENCOUNTER — READMISSION MANAGEMENT (OUTPATIENT)
Dept: CALL CENTER | Facility: HOSPITAL | Age: 47
End: 2022-12-30

## 2022-12-30 VITALS
DIASTOLIC BLOOD PRESSURE: 87 MMHG | BODY MASS INDEX: 30.38 KG/M2 | WEIGHT: 249.5 LBS | RESPIRATION RATE: 16 BRPM | HEART RATE: 81 BPM | TEMPERATURE: 97.7 F | HEIGHT: 76 IN | SYSTOLIC BLOOD PRESSURE: 167 MMHG | OXYGEN SATURATION: 96 %

## 2022-12-30 LAB
ANION GAP SERPL CALCULATED.3IONS-SCNC: 9 MMOL/L (ref 5–15)
BUN SERPL-MCNC: 14 MG/DL (ref 6–20)
BUN/CREAT SERPL: 19.4 (ref 7–25)
CALCIUM SPEC-SCNC: 8.4 MG/DL (ref 8.6–10.5)
CHLORIDE SERPL-SCNC: 97 MMOL/L (ref 98–107)
CO2 SERPL-SCNC: 25 MMOL/L (ref 22–29)
CREAT SERPL-MCNC: 0.72 MG/DL (ref 0.76–1.27)
EGFRCR SERPLBLD CKD-EPI 2021: 114.1 ML/MIN/1.73
GLUCOSE BLDC GLUCOMTR-MCNC: 106 MG/DL (ref 70–130)
GLUCOSE SERPL-MCNC: 117 MG/DL (ref 65–99)
HCT VFR BLD AUTO: 36 % (ref 37.5–51)
HGB BLD-MCNC: 12.5 G/DL (ref 13–17.7)
PA ADP PRP-ACNC: 150 PRU
POTASSIUM SERPL-SCNC: 4.3 MMOL/L (ref 3.5–5.2)
SODIUM SERPL-SCNC: 131 MMOL/L (ref 136–145)
SODIUM SERPL-SCNC: 135 MMOL/L (ref 136–145)

## 2022-12-30 PROCEDURE — 80048 BASIC METABOLIC PNL TOTAL CA: CPT | Performed by: STUDENT IN AN ORGANIZED HEALTH CARE EDUCATION/TRAINING PROGRAM

## 2022-12-30 PROCEDURE — 85014 HEMATOCRIT: CPT | Performed by: STUDENT IN AN ORGANIZED HEALTH CARE EDUCATION/TRAINING PROGRAM

## 2022-12-30 PROCEDURE — 85018 HEMOGLOBIN: CPT | Performed by: STUDENT IN AN ORGANIZED HEALTH CARE EDUCATION/TRAINING PROGRAM

## 2022-12-30 PROCEDURE — 82962 GLUCOSE BLOOD TEST: CPT

## 2022-12-30 PROCEDURE — 99232 SBSQ HOSP IP/OBS MODERATE 35: CPT | Performed by: INTERNAL MEDICINE

## 2022-12-30 PROCEDURE — 25010000002 HEPARIN (PORCINE) PER 1000 UNITS: Performed by: STUDENT IN AN ORGANIZED HEALTH CARE EDUCATION/TRAINING PROGRAM

## 2022-12-30 PROCEDURE — 99024 POSTOP FOLLOW-UP VISIT: CPT | Performed by: THORACIC SURGERY (CARDIOTHORACIC VASCULAR SURGERY)

## 2022-12-30 PROCEDURE — 85576 BLOOD PLATELET AGGREGATION: CPT | Performed by: STUDENT IN AN ORGANIZED HEALTH CARE EDUCATION/TRAINING PROGRAM

## 2022-12-30 RX ORDER — METOPROLOL TARTRATE 50 MG/1
50 TABLET, FILM COATED ORAL EVERY 12 HOURS SCHEDULED
Qty: 90 TABLET | Refills: 2 | Status: SHIPPED | OUTPATIENT
Start: 2022-12-30

## 2022-12-30 RX ORDER — HYDROCODONE BITARTRATE AND ACETAMINOPHEN 7.5; 325 MG/1; MG/1
1 TABLET ORAL EVERY 6 HOURS PRN
Qty: 24 TABLET | Refills: 0 | Status: SHIPPED | OUTPATIENT
Start: 2022-12-30 | End: 2023-01-23

## 2022-12-30 RX ORDER — SODIUM CHLORIDE 1000 MG
1 TABLET, SOLUBLE MISCELLANEOUS
Qty: 42 TABLET | Refills: 0 | Status: SHIPPED | OUTPATIENT
Start: 2022-12-30 | End: 2023-01-12 | Stop reason: SDUPTHER

## 2022-12-30 RX ADMIN — CLOPIDOGREL BISULFATE 75 MG: 75 TABLET ORAL at 08:43

## 2022-12-30 RX ADMIN — HEPARIN SODIUM 5000 UNITS: 5000 INJECTION INTRAVENOUS; SUBCUTANEOUS at 05:23

## 2022-12-30 RX ADMIN — METOPROLOL TARTRATE 50 MG: 50 TABLET, FILM COATED ORAL at 08:42

## 2022-12-30 RX ADMIN — Medication 10 ML: at 09:17

## 2022-12-30 RX ADMIN — SACUBITRIL AND VALSARTAN 1 TABLET: 49; 51 TABLET, FILM COATED ORAL at 08:43

## 2022-12-30 RX ADMIN — ACETAMINOPHEN 650 MG: 325 TABLET ORAL at 05:22

## 2022-12-30 RX ADMIN — AMLODIPINE BESYLATE 10 MG: 10 TABLET ORAL at 08:43

## 2022-12-30 RX ADMIN — ASPIRIN 325 MG: 325 TABLET, COATED ORAL at 08:42

## 2022-12-30 RX ADMIN — SODIUM CHLORIDE 2 G: 1 TABLET ORAL at 08:45

## 2022-12-30 NOTE — PROGRESS NOTES
[facility] Cardiology  Inpatient Progress Note      Chief Complaint/Reason for consult:  CAD s/p CABG         Subjective  Feeling well, eager to get home    Review of Systems:   Negative for chest pain, shortness of breath, palpitations, fevers, chills, abdominal pain, nausea, vomiting       Vital Sign Min/Max for last 24 hours  Temp  Min: 97.7 °F (36.5 °C)  Max: 98.4 °F (36.9 °C)   BP  Min: 117/93  Max: 135/82   Pulse  Min: 69  Max: 89   Resp  Min: 16  Max: 18   SpO2  Min: 93 %  Max: 96 %   No data recorded      Intake/Output Summary (Last 24 hours) at 12/30/2022 0839  Last data filed at 12/30/2022 0326  Gross per 24 hour   Intake 840 ml   Output 1250 ml   Net -410 ml           Gen: well developed, sitting up in bed , comfortable appearing  HEENT: MMM, sclerae anicteric, conjunctivae normal  CV: regular rate, regular rhythm, no murmurs or rubs, normal S1, S2  Pulm: Is a cannula oxygen, normal work of breathing, no wheezes, rales, rhonchi  Ext: normal bulk for age, normal tone, no dependent edema  Neuro: alert, oriented, face symmetrical, moving all extremities well  Psych: normal mood, appropriate affect    Tele:  SR, no arrhythmia alarms overnight    Results Review (reviewed the patient's recent labs in the electronic medical record):     No radiology results for the last day  Lab Results   Component Value Date    WBC 11.49 (H) 12/28/2022    HGB 12.5 (L) 12/30/2022    HCT 36.0 (L) 12/30/2022    MCV 86.2 12/28/2022     12/28/2022     Lab Results   Component Value Date    GLUCOSE 117 (H) 12/30/2022    CALCIUM 8.4 (L) 12/30/2022     (L) 12/30/2022    K 4.3 12/30/2022    CO2 25.0 12/30/2022    CL 97 (L) 12/30/2022    BUN 14 12/30/2022    CREATININE 0.72 (L) 12/30/2022    BCR 19.4 12/30/2022    ANIONGAP 9.0 12/30/2022 12/6 - Transthoracic Echo     Interpretation Summary  •  Left ventricular ejection fraction appears to be 61 - 65%.  •  Left ventricular wall thickness is consistent  with moderate to severe concentric hypertrophy.  •  Left ventricular diastolic function is consistent with (grade II w/high LAP) pseudonormalization.    1.  LV size, function, and wall motion are normal.  Severe concentric left ventricular hypertrophy.  Visually estimated ejection fraction is greater than or equal to 65%.  Grade 2 diastolic dysfunction.  There is no echocardiographic or Doppler evidence of dynamic LV outflow tract obstruction.  The left atrium is mildly dilated.  Right heart chambers are normal.  No septal defect or intracavitary mass or thrombus.    2.  The mitral valve is morphologically and physiologically normal.  The aortic valve is tricuspid and normally configured with no aortic stenosis or significant aortic insufficiency.  There is physiologic TR from a morphologically normal valve.    3.  No pericardial or great vessel pathology.     Assessment       CAD s/p CABG   - post-op management per CTS   - aspirin 325, clopidogrel, rosuvastatin    Hypertension, acceptable control   - resumed home amlodipine, sacubitril-valsartan 49-51   - metoprolol tartrate 50mg BID   - Bps acceptably controlled without home ISMN    Chronic HFpEF   - currently euvolemic   - sacubitril-valsartan as above    Hyponatremia - stable   - following BMP, off diuretics for now   - 12/25 low urine osmolality, urine Na 28mmol/L   - Na tabs started 12/26    Dispo   - Will plan for cardiology follow-up after discharge with his regular cardiologist closer to home in [place]    MONICA Mcneal MD, MS  12/30/2022  08:39 EST

## 2022-12-30 NOTE — OUTREACH NOTE
Prep Survey    Flowsheet Row Responses   [facility] patient discharged from? [facility]   Is LACE score < 7 ? No   Eligibility Readm Mgmt   Discharge diagnosis CORONARY ARTERY BYPASS GRAFT   Does the patient have one of the following disease processes/diagnoses(primary or secondary)? Cardiothoracic surgery   Does the patient have Home health ordered? No   Is there a DME ordered? No   Prep survey completed? Yes          ALEX MATA - Registered Nurse

## 2022-12-30 NOTE — CASE MANAGEMENT/SOCIAL WORK
Case Management Discharge Note      Final Note: Discussed in MDR, no d/c needs verbalized in rounds, per previous CM, plan is home.         Selected Continued Care - Discharged on 12/30/2022 Admission date: 12/23/2022 - Discharge disposition: Home or Self Care    Destination    No services have been selected for the patient.              Durable Medical Equipment    No services have been selected for the patient.              Dialysis/Infusion    No services have been selected for the patient.              Home Medical Care    No services have been selected for the patient.              Therapy    No services have been selected for the patient.              Community Resources    No services have been selected for the patient.              Community & DME    No services have been selected for the patient.                       Final Discharge Disposition Code: 01 - home or self-care

## 2022-12-30 NOTE — PROGRESS NOTES
[facility] Medicine Services  CLINICAL REVIEW NOTE    Patient Name: Stone Villeda  : 1975  MRN: [id number]    Date of Admission: 2022  Length of Stay: 7  Attending Service:  CT surgery     Plan for hyponatremia- patient will be discharged on 1G salt tabs TID. Plan is to follow up with PCP early next week (Monday or Tuesday) with repeat BMP to assess need for ongoing salt tabs.  Salt tabs have been prescribed and sent to pharmacy.       Patient's labs, charting, and events reviewed.  No active medical management issues to address today, the Hospitalist team will continue to follow daily, be available for any needs, and see or bill for services as needed.    Electronically signed by ELEANOR Ashley, 22, 1:48 PM EST.

## 2022-12-30 NOTE — PROGRESS NOTES
Cardiothoracic Surgery Progress Note      POD # 7 s/p CABG x2     LOS: 7 days      Subjective:  Ambulated 700 ft with PT yesterday. VSS on room air.  States he's ready to go home.    Objective:  Vital Signs  Temp:  [97.8 °F (36.6 °C)-98.4 °F (36.9 °C)] 98.4 °F (36.9 °C)  Heart Rate:  [69-89] 76  Resp:  [16-18] 18  BP: (117-135)/(73-82) 135/82    Physical Exam:   General Appearance: alert, appears stated age and cooperative   Lungs: clear to auscultation, respirations regular, respirations even and respirations unlabored   Heart: regular rhythm & normal rate, normal S1, S2, no murmur, no gallop, no rub and no click   Skin: Incision c/d/i       Results:    Results from last 7 days   Lab Units 12/30/22  0539 12/29/22  0707 12/28/22  0409   WBC 10*3/mm3  --   --  11.49*   HEMOGLOBIN g/dL 12.5*   < > 13.4   HEMATOCRIT % 36.0*   < > 39.4   PLATELETS 10*3/mm3  --   --  250    < > = values in this interval not displayed.     Results from last 7 days   Lab Units 12/30/22  0539   SODIUM mmol/L 131*   POTASSIUM mmol/L 4.3   CHLORIDE mmol/L 97*   CO2 mmol/L 25.0   BUN mg/dL 14   CREATININE mg/dL 0.72*   GLUCOSE mg/dL 117*   CALCIUM mg/dL 8.4*       Assessment:  POD # 7 s/p CABG x2    Plan:  Hyponatremia management per hospitalist  Ambulate  Pulmonary toilet  Discharge home today if all agree      Mary Carmen Suarez PA-C  12/30/22  07:51 EST

## 2023-01-04 ENCOUNTER — READMISSION MANAGEMENT (OUTPATIENT)
Dept: CALL CENTER | Facility: HOSPITAL | Age: 48
End: 2023-01-04
Payer: COMMERCIAL

## 2023-01-04 NOTE — OUTREACH NOTE
CT Surgery Week 1 Survey    Flowsheet Row Responses   [facility] patient discharged from? [place]   Does the patient have one of the following disease processes/diagnoses(primary or secondary)? Cardiothoracic surgery   Week 1 attempt successful? Yes   Call start time 0856   Call end time 0857   Discharge diagnosis CORONARY ARTERY BYPASS GRAFT   Meds reviewed with patient/caregiver? Yes   Is the patient having any side effects they believe may be caused by any medication additions or changes? No   Does the patient have all medications related to this admission filled (includes all antibiotics, pain medications, cardiac medications, etc.) Yes   Is the patient taking all medications as directed (includes completed medication regime)? Yes   Does the patient have a primary care provider?  Yes   Does the patient have an appointment scheduled with their C/T surgeon? Yes   Has the patient kept scheduled appointments due by today? N/A   Has home health visited the patient within 72 hours of discharge? N/A   Psychosocial issues? No   Did the patient receive a copy of their discharge instructions? Yes   Nursing interventions Reviewed instructions with patient   What is the patient's perception of their health status since discharge? Improving   Is the patient/caregiver able to teach back signs and symptoms of incisional infection? Increased redness, swelling or pain at the incisonal site, Increased drainage or bleeding, Incisional warmth, Pus or odor from incision, Fever   Is the patient/caregiver able to teach back steps to recovery at home? Rest and rebuild strength, gradually increase activity, Eat a well-balance diet   Week 1 call completed? Yes            STEPHEN LAWTON - Registered Nurse

## 2023-01-05 NOTE — PROGRESS NOTES
[facility]  Heart and Valve Center    Chief Complaint  Post-op Open Heart Surgery    Subjective    History of Present Illness {CC  Problem List  Visit  Diagnosis   Encounters  Notes  Medications  Labs  Result Review Imaging  Media :23}     Stone Villeda is a 47 y.o. male with hypertension, CAD,HFpEF, diabetes, hyperlipidemia, tobacco abuse, strong family history of CAD, and DARON who presents today as a hospital referral for CAD status post CABG.    Patient had CABG x2 on 12/23 by Dr. Walker.  Echo on 12/6 showed normal LVEF with severe LVH.  He had postoperative hyponatremia and was started on sodium chloride. He reports that he is doing \"too good\".  He reports that he is doing so good that he is tempted to do more activity than what he should.  He denies any shortness of breath, lower extremity edema, palpitations, dizziness or lightheadedness.  He saw his primary care provider yesterday and had a sodium rechecked which was 134.  He was found to have an elevated white blood count and he was treated with IM Rocephin.  He denies any symptoms of infection.           Objective     Vital Signs:   Vitals:    01/06/23 0850 01/06/23 0851 01/06/23 0852   BP: 158/83 148/78 145/71   BP Location: Right arm Left arm Left arm   Patient Position: Sitting Standing Sitting   Cuff Size: Large Adult Large Adult Large Adult   Pulse: 79 93 83   Resp:   18   Temp: 97.2 °F (36.2 °C) 97.2 °F (36.2 °C) 97.2 °F (36.2 °C)   TempSrc: Temporal Temporal Temporal   SpO2: 100% 100% 100%   Weight:   117 kg (258 lb 9.6 oz)   Height:   193 cm (76\")     Body mass index is 31.48 kg/m².  Physical Exam  Vitals reviewed.   Constitutional:       Appearance: Normal appearance.   HENT:      Head: Normocephalic.   Neck:      Vascular: No carotid bruit.   Cardiovascular:      Rate and Rhythm: Normal rate and regular rhythm.      Pulses: Normal pulses.      Heart sounds: Normal heart sounds, S1 normal and S2 normal. No murmur  heard.  Pulmonary:      Effort: Pulmonary effort is normal. No respiratory distress.      Breath sounds: Normal breath sounds.   Chest:      Chest wall: No tenderness.   Abdominal:      General: Abdomen is flat.      Palpations: Abdomen is soft.   Musculoskeletal:      Cervical back: Neck supple.      Right lower leg: No edema.      Left lower leg: No edema.   Skin:     General: Skin is warm and dry.      Comments: Midsternal, MT sites and EVH sites are clean dry and intact with no evidence of infection   Neurological:      General: No focal deficit present.      Mental Status: He is alert and oriented to person, place, and time. Mental status is at baseline.   Psychiatric:         Mood and Affect: Mood normal.         Behavior: Behavior normal.         Thought Content: Thought content normal.              Result Review  Data Reviewed:{ Labs  Result Review  Imaging  Med Tab  Media :23}   ECG 12 Lead (12/25/2022 03:39)  Stress Test With Myocardial Perfusion One Day (12/06/2022 11:04)  Adult Transthoracic Echo Complete W/ Cont if Necessary Per Protocol (12/06/2022 09:54)  POC Glucose Once (12/30/2022 08:07)  Basic Metabolic Panel (12/30/2022 05:39)  Hemoglobin & Hematocrit, Blood (12/30/2022 05:39)    Hospital notes reviewed             Assessment and Plan {CC Problem List  Visit Diagnosis  ROS  Review (Popup)  Health Maintenance  Quality  BestPractice  Medications  SmartSets  SnapShot Encounters  Media :23}   1. Acute hyponatremia  -Sodium yesterday was 134.  He tells me his PCP wanted defer further management to cardiology.  Advised him that he could try going down to twice daily dosing of sodium chloride.  Would repeat labs in 2 to 3 weeks.  To follow-up with his PCP in 4 weeks, will defer further management to PCP or cardiology  - Basic Metabolic Panel; Future    2. Malignant hypertension  -He did not take his medications today.  He reports home blood pressures are typically in the 130s.  He  reports his blood pressure yesterday at his doctor's visit was 120.  Advised to continue home monitoring.  Advised him to bring his blood pressure cuff to his next appoint with his PCP to calibrate  -Discussed concern for significant LVH and importance of strict blood pressure control    3. Coronary artery disease involving coronary bypass graft of native heart without angina pectoris  Status post CABG x2 on 12/23 with excellent recovery  Discussed signs and symptoms of appropriate healing and reasons to call.  Keep upcoming appointment with CT surgery  Encourage cardiac rehab once cleared from CT surgery    4. Chronic heart failure with preserved ejection fraction (HFpEF) ([facility])  Euvolemic  Discussed signs and symptoms and when to call  Continue Entresto      Keep upcoming appoint with CT surgery.  Advised patient to call his local cardiologist to make sure he has an appointment for sometime in February    Follow Up {Instructions Charge Capture  Follow-up Communications :23}   No follow-ups on file.    Patient was given instructions and counseling regarding his condition or for health maintenance advice. Please see specific information pulled into the AVS if appropriate.  Advised to call the Heart and Valve Center with any questions, concerns, or worsening symptoms.

## 2023-01-06 ENCOUNTER — OFFICE VISIT (OUTPATIENT)
Dept: CARDIOLOGY | Facility: HOSPITAL | Age: 48
End: 2023-01-06
Payer: COMMERCIAL

## 2023-01-06 ENCOUNTER — TELEPHONE (OUTPATIENT)
Dept: CARDIOLOGY | Facility: CLINIC | Age: 48
End: 2023-01-06

## 2023-01-06 VITALS
WEIGHT: 258.6 LBS | OXYGEN SATURATION: 100 % | HEART RATE: 83 BPM | BODY MASS INDEX: 31.49 KG/M2 | RESPIRATION RATE: 18 BRPM | SYSTOLIC BLOOD PRESSURE: 145 MMHG | HEIGHT: 76 IN | TEMPERATURE: 97.2 F | DIASTOLIC BLOOD PRESSURE: 71 MMHG

## 2023-01-06 DIAGNOSIS — I50.32 CHRONIC HEART FAILURE WITH PRESERVED EJECTION FRACTION (HFPEF): ICD-10-CM

## 2023-01-06 DIAGNOSIS — E87.1 ACUTE HYPONATREMIA: Primary | ICD-10-CM

## 2023-01-06 DIAGNOSIS — I10 MALIGNANT HYPERTENSION: ICD-10-CM

## 2023-01-06 DIAGNOSIS — I25.810 CORONARY ARTERY DISEASE INVOLVING CORONARY BYPASS GRAFT OF NATIVE HEART WITHOUT ANGINA PECTORIS: ICD-10-CM

## 2023-01-06 PROCEDURE — 99214 OFFICE O/P EST MOD 30 MIN: CPT | Performed by: NURSE PRACTITIONER

## 2023-01-06 NOTE — TELEPHONE ENCOUNTER
Caller: Stone Villeda    Relationship: Self    Best call back number: [phone]    What is the best time to reach you: ANYTIME     Who are you requesting to speak with (clinical staff, provider,  specific staff member): ANYONE     What was the call regarding: PATIENT HAD OPEN HEART SURGERY ON THE 12.23.22. WAS ADVISED TO FOLLOW UP WITH HIS CARDIOLOGIST. NO AVAILABILITY TILL April.     Do you require a callback: YES

## 2023-01-09 NOTE — DISCHARGE SUMMARY
CTS Discharge Summary    Patient Care Team:  Amita Crook APRN as PCP - General (Nurse Practitioner)  Consults:   Consults     Date and Time Order Name Status Description    12/28/2022  1:06 PM Inpatient Consult to Hospitalist ELEANOR for Medical Management Completed     12/23/2022 10:19 AM Inpatient Consult to Cardiology Completed           Date of Admission: 12/23/2022  5:38 AM  Date of Discharge:  12/30/2022    Discharge Diagnosis  Past Medical History:   Diagnosis Date   • Coronary artery disease    • COVID-19 02/15/2022   • Diabetes mellitus (HCC)    • Hyperlipidemia    • Hypertension    • Sleep apnea     NO CPAP     Patient Active Problem List   Diagnosis   • Other chest pain   • Essential hypertension   • Hyperlipidemia   • Smoking   • Family history of early CAD   • Shortness of breath   • Palpitations   • DM (diabetes mellitus), type 2 (HCC)   • PVC (premature ventricular contraction)   • NSVT (nonsustained ventricular tachycardia)   • Grade I diastolic dysfunction   • Coronary artery disease of native artery of native heart with stable angina pectoris (HCC)   • Dizziness   • Abnormal stress test   • Peripheral edema   • Ischemic cardiomyopathy   • 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 pectoris (HCC)   • Hyponatremia   • Acute respiratory failure with hypoxia (HCC)       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 pectoris (HCC) [I25.119]  Type 2 diabetes mellitus with other circulatory complication, with long-term current use of insulin ([facility]) [E11.59, Z79.4]     Procedures Performed  Procedure(s):  MEDIAN STERNOTOMY CORONARY ARTERY BYPASS GRAFTING X 2  UTILIZING THE LEFT INTERNAL MAMMERY GRAFT WITH EVH OF THE GREATER RIGHT SAPHENOUS VEIN     History of Present Illness  Patient is a 47 y.o. male who was referred to be evaluated for coronary bypass grafting surgery.  He has a history of smoking which is ongoing, a  history of PVCs and history of ventricular tachycardia in the past. The catheterization demonstrated a very tight 78% proximal left anterior descending coronary lesion 30% in the circumflex and what appears to be an occluded right coronary.  He has a strong family history of coronary disease and his father has had bypass surgery a number of times and his younger brothers had multiple coronary stents. He has diabetes, himself.  He is pain-free in the office but apparently he notices some occasional peripheral edema and shortness of breath with activity.      Hospital Course  Patient was taken to the operating room on 12/23/22 for CABG x2 with EVH.  Patient was transported to cardiac ICU intubated and in stable condition.Extubated per ICU protocol.  POD 1: Plavix restarted in evening.  POD 2: HFNC. Chest tubes and pacing wires removed. Diuresed.  POD 3: Ambulating well. BiPAP.  POD 4: Diuresed. Plavix 150 mg. Transferred to telemetry.  POD 6: Patient met discharge criteria and was discharged to home    Discharge Medications     Discharge Medications      New Medications      Instructions Start Date   HYDROcodone-acetaminophen 7.5-325 MG per tablet  Commonly known as: NORCO  Notes to patient: For Pain    1 tablet, Oral, Every 6 Hours PRN      metoprolol tartrate 50 MG tablet  Commonly known as: LOPRESSOR  Notes to patient: For Heart Rate/CABG    50 mg, Oral, Every 12 Hours Scheduled      sodium chloride 1 g tablet  Notes to patient: For Sodium    1 g, Oral, 3 Times Daily With Meals         Changes to Medications      Instructions Start Date   nitroglycerin 0.4 MG SL tablet  Commonly known as: NITROSTAT  What changed:   · how much to take  · how to take this  · when to take this  · reasons to take this  · additional instructions  Notes to patient: For Chest Pain    1 under the tongue as needed for angina, may repeat q5mins for up three doses         Continue These Medications      Instructions Start Date   amLODIPine 10  MG tablet  Commonly known as: NORVASC  Notes to patient: For Blood Pressure    10 mg, Oral, Daily      aspirin 325 MG tablet  Notes to patient: For Heart Health/CABG    325 mg, Oral, Daily, PT CURRENTLY TAKING THE 325MG      cloNIDine 0.1 MG tablet  Commonly known as: Catapres  Notes to patient: For Blood Pressure    0.1 mg, Oral, 3 Times Daily PRN      clopidogrel 75 MG tablet  Commonly known as: PLAVIX  Notes to patient: For Heart Health    75 mg, Oral, Daily, HOLD STARTING 12/18/2022      metFORMIN  MG 24 hr tablet  Commonly known as: GLUCOPHAGE-XR  Notes to patient: For Diabetes   500 mg, Oral, 2 Times Daily      rosuvastatin 20 MG tablet  Commonly known as: CRESTOR  Notes to patient: For Cholesterol   20 mg, Oral, Daily      sacubitril-valsartan 49-51 MG tablet  Commonly known as: ENTRESTO  Notes to patient: For Blood Pressure/Heart Health    1 tablet, Oral, 2 Times Daily      Ventolin  (90 Base) MCG/ACT inhaler  Generic drug: albuterol sulfate HFA  Notes to patient: For Shortness of Breath/Wheezing    2 puffs, Inhalation, Every 6 Hours PRN, CURRENTLY USING DAILY FOR IMPROVING COUGH         Stop These Medications    atenolol 25 MG tablet  Commonly known as: TENORMIN     isosorbide mononitrate 30 MG 24 hr tablet  Commonly known as: IMDUR            Discharge Diet:   Diet Instructions     Diet: Consistent Carbohydrate, Cardiac      Discharge Diet:  Consistent Carbohydrate  Cardiac             Activity at Discharge:   Activity Instructions     Bathing Restrictions      Type of Restriction: Bathing    Bathing Restrictions: No Tub Bath    Driving Restrictions      Type of Restriction: Driving    Driving Restrictions: No Driving Until Next Appointment    Lifting Restrictions      Type of Restriction: Lifting    Lifting Restrictions: Lifting Restriction (Indicate Limit)    Weight Limit (Pounds): 10    Length of Lifting Restriction: until next appointment          Follow-up Appointments  Future  Appointments   Date Time Provider Department Center   1/12/2023 10:45 AM Lucy Flood APRN [facility]   1/23/2023  3:00 PM Karen Zamora APRN [facility]   4/7/2023  9:15 AM Lucy Flood APRN [facility]        WOUND CARE: Keep incisions clean and dry at all times. Take a shower daily. Do NOT take a tub bath or submerge yourself in hot tubs, swimming pools, or any other body of water until seen by the cardiac surgeon in your follow-up visit. Clean  your body and incisions daily with Dial soap and water. Always use a clean washcloth. Do not scrub your incision(s). Do not re-use dressings on your incision(s). Do not use any lotions, creams, oils, powders, antibiotic ointment (i.e., Neosporin), peroxide, alcohol, or iodine UNLESS told to do by the cardiac surgeon.      Mary Carmen Suarez PA-C  01/09/23  09:52 EST

## 2023-01-11 ENCOUNTER — READMISSION MANAGEMENT (OUTPATIENT)
Dept: CALL CENTER | Facility: HOSPITAL | Age: 48
End: 2023-01-11
Payer: COMMERCIAL

## 2023-01-11 NOTE — OUTREACH NOTE
CT Surgery Week 2 Survey    Flowsheet Row Responses   [facility] patient discharged from? [place]   Does the patient have one of the following disease processes/diagnoses(primary or secondary)? Cardiothoracic surgery   Week 2 attempt successful? Yes   Call start time 1509   Call end time 1517   Discharge diagnosis CORONARY ARTERY BYPASS GRAFT   Person spoke with today (if not patient) and relationship patient   Meds reviewed with patient/caregiver? Yes   Is the patient having any side effects they believe may be caused by any medication additions or changes? No   Does the patient have all medications related to this admission filled (includes all antibiotics, pain medications, cardiac medications, etc.) Yes   Is the patient taking all medications as directed (includes completed medication regime)? Yes   Does the patient have a primary care provider?  Yes   Does the patient have an appointment scheduled with their C/T surgeon? Yes   Comments regarding PCP 1/10/23   Has the patient kept scheduled appointments due by today? N/A   Comments Cardiology fu appt 1/12/23,  Post-Op appt on 1/23/23 at 3:00 PM   Psychosocial issues? No   What is the patient's perception of their health status since discharge? Improving   Nursing interventions Nurse provided patient education   Is the patient/caregiver able to teach back normal signs of recovery? Pain or discomfort at incisional site   Nursing interventions Reassured on normal signs of recovery   Is the patient /caregiver able to teach back basic post-op care? Shower daily, No tub bath, swimming, or hot tub until instructed by MD, Use a clean wash cloth and antibacterial bar or liquid soap to clean incisions, Lifting as instructed by MD in discharge instructions   Is the patient/caregiver able to teach back signs and symptoms of incisional infection? Increased redness, swelling or pain at the incisonal site, Increased drainage or bleeding, Incisional warmth, Pus or odor  from incision, Fever   Is the patient/caregiver able to teach back steps to recovery at home? Rest and rebuild strength, gradually increase activity, Eat a well-balance diet   Is the patient /caregiver able to teach back the importance of cardiac rehab? Yes   Nursing interventions Provided education on importance of cardiac rehab   If the patient is a current smoker, are they able to teach back resources for cessation? Not a smoker   Is the patient/caregiver able to teach back the hierarchy of who to call/visit for symptoms/problems? PCP, Specialist, Home health nurse, Urgent Care, ED, 911 Yes   Week 2 call completed? Yes   Is the patient interested in additional calls from an ambulatory ?  NOTE:  applies to high risk patients requiring additional follow-up. No   Wrap up additional comments Pt states he is doing better, and denies increased redness, swelling, drainage, warmth at incisional site. Pt had PCP fu appt. Pt has cardiology fu appt on 1/12/23, and post-op on 1/23/23.          IRIS LAWTON - Registered Nurse

## 2023-01-12 ENCOUNTER — OFFICE VISIT (OUTPATIENT)
Dept: CARDIOLOGY | Facility: CLINIC | Age: 48
End: 2023-01-12
Payer: COMMERCIAL

## 2023-01-12 VITALS
SYSTOLIC BLOOD PRESSURE: 137 MMHG | OXYGEN SATURATION: 100 % | TEMPERATURE: 98.6 F | HEIGHT: 76 IN | HEART RATE: 76 BPM | DIASTOLIC BLOOD PRESSURE: 75 MMHG | BODY MASS INDEX: 31.05 KG/M2 | WEIGHT: 255 LBS

## 2023-01-12 DIAGNOSIS — I49.3 PVC (PREMATURE VENTRICULAR CONTRACTION): ICD-10-CM

## 2023-01-12 DIAGNOSIS — I10 ESSENTIAL HYPERTENSION: ICD-10-CM

## 2023-01-12 DIAGNOSIS — R60.9 PERIPHERAL EDEMA: ICD-10-CM

## 2023-01-12 DIAGNOSIS — I25.810 CORONARY ARTERY DISEASE INVOLVING CORONARY BYPASS GRAFT OF NATIVE HEART WITHOUT ANGINA PECTORIS: Primary | ICD-10-CM

## 2023-01-12 DIAGNOSIS — E78.5 HYPERLIPIDEMIA, UNSPECIFIED HYPERLIPIDEMIA TYPE: ICD-10-CM

## 2023-01-12 DIAGNOSIS — I25.5 ISCHEMIC CARDIOMYOPATHY: ICD-10-CM

## 2023-01-12 DIAGNOSIS — I47.29 NSVT (NONSUSTAINED VENTRICULAR TACHYCARDIA): ICD-10-CM

## 2023-01-12 DIAGNOSIS — I51.89 GRADE I DIASTOLIC DYSFUNCTION: ICD-10-CM

## 2023-01-12 DIAGNOSIS — R06.02 SHORTNESS OF BREATH: ICD-10-CM

## 2023-01-12 PROBLEM — F17.200 SMOKING: Status: RESOLVED | Noted: 2021-05-26 | Resolved: 2023-01-12

## 2023-01-12 PROBLEM — I25.119 CORONARY ARTERY DISEASE INVOLVING NATIVE CORONARY ARTERY OF NATIVE HEART WITH ANGINA PECTORIS (HCC): Status: RESOLVED | Noted: 2022-12-23 | Resolved: 2023-01-12

## 2023-01-12 PROBLEM — IMO0001 SMOKING: Status: RESOLVED | Noted: 2021-05-26 | Resolved: 2023-01-12

## 2023-01-12 PROCEDURE — 99214 OFFICE O/P EST MOD 30 MIN: CPT | Performed by: NURSE PRACTITIONER

## 2023-01-12 RX ORDER — SODIUM CHLORIDE 1000 MG
1 TABLET, SOLUBLE MISCELLANEOUS 2 TIMES DAILY
Qty: 20 TABLET | Refills: 0
Start: 2023-01-12 | End: 2023-01-23

## 2023-01-12 NOTE — PROGRESS NOTES
Subjective   Stone Villeda is a 47 y.o. male     Chief Complaint   Patient presents with   • Coronary Artery Disease       HPI    Problem list:    1.  CAD  1.1 left heart cath 7/19/2021 - 50% narrowing in the proximal portion of the LAD, diffuse irregularities throughout the circumflex with 30 to 50% narrowing before the PDA, right coronary artery totally occluded just proximal to the acute margin, however FFR was 0.85.  EF 40 to 45%, LVEDP 12-14  1.2 stress test 10/26/2021-no evidence of ischemia however there is a reversible redistribution pattern in the inferior wall compatible myocardial viability, post-rest EF 55% with global hypokinesis  1.3 stress test 12/6/2022-large, dense being completely reversible defect involving the inferior basilar and diaphragmatic segments with small extension into the inferolateral wall compatible with ischemia, post-rest EF 38% with global hypokinesis  1.4 left heart cath 12/9/2022-left main mild irregularities, LAD 60 to 70% stenosis, left circumflex 20-30 mid and distal, right coronary artery 99% stenosis, marginal branch 95 to 90% stenosis, FFR 0.78, hemodynamic significant LAD, subtotal occlusion of the mid RCA, consideration for CABG due to the fact patient is diabetic  1.5 CABG times 212/23/2022, Dr. Beltrán to the LAD, vein graft to the posterior descending branch  2.  Hypertension  3.  Hyperlipidemia  4.  Palpitations  4.1 event monitor 6/10-6/23/2021-PVCs, NSVT (5 beat)  5.  Shortness of breath  5.1 Echo 10/26/2021-EF 60 to 65%, diastolic dysfunction 1  5.2 Echo 12/6/2022-EF 61 to 65%, moderate to severe LVH, diastolic dysfunction 2  6.  Family history of early CAD; Brother first MI @ 38; DAD CABG @ 49  7.  DARON, CPAP  8.  Diabetes Mellitus II; recently dx 2021  9.  Smoking Habituation (QUIT 12/23/2022)  10.  Renal artery ultrasound 6/21/2021-no hemodynamically significant renal artery stenosis    Patient is a 47-year-old male who presents today for follow-up status  post CABG x2 with Dr. Walker.  Patient denies any chest pain, pressure, palpitations, fluttering, dizziness, presyncope, syncope, orthopnea, PND or edema.  He denies any shortness of breath with activity.  He says he feels very good.  Patient says he has not smoked since bypass.    Patient's midsternum scar looks great he has 1 scab in the top portion and he has a couple scabs where he had his chest tubes.  He also has a little bit of scabbing on his right lower extremity.  Current Outpatient Medications on File Prior to Visit   Medication Sig Dispense Refill   • amLODIPine (NORVASC) 10 MG tablet Take 10 mg by mouth Daily.     • aspirin 325 MG tablet Take 325 mg by mouth Daily. PT CURRENTLY TAKING THE 325MG     • cloNIDine (Catapres) 0.1 MG tablet Take 1 tablet by mouth 3 (Three) Times a Day As Needed for High Blood Pressure (SBP > 160 or DBP > 90). 30 tablet 5   • clopidogrel (PLAVIX) 75 MG tablet Take 1 tablet by mouth Daily. HOLD STARTING 12/18/2022 90 tablet 3   • HYDROcodone-acetaminophen (NORCO) 7.5-325 MG per tablet Take 1 tablet by mouth Every 6 (Six) Hours As Needed for Moderate Pain. 24 tablet 0   • metFORMIN ER (GLUCOPHAGE-XR) 500 MG 24 hr tablet Take 500 mg by mouth 2 (Two) Times a Day.     • metoprolol tartrate (LOPRESSOR) 50 MG tablet Take 1 tablet by mouth Every 12 (Twelve) Hours. 90 tablet 2   • nitroglycerin (NITROSTAT) 0.4 MG SL tablet 1 under the tongue as needed for angina, may repeat q5mins for up three doses (Patient taking differently: Place 0.4 mg under the tongue Every 5 (Five) Minutes As Needed. 1 under the tongue as needed for angina, may repeat q5mins for up three doses  PT HAS NEVER TAKEN) 30 tablet 2   • rosuvastatin (CRESTOR) 20 MG tablet Take 1 tablet by mouth Daily. 90 tablet 3   • sacubitril-valsartan (ENTRESTO) 49-51 MG tablet Take 1 tablet by mouth 2 (Two) Times a Day. 60 tablet 11   • Ventolin  (90 Base) MCG/ACT inhaler Inhale 2 puffs Every 6 (Six) Hours As Needed for  Wheezing. CURRENTLY USING DAILY FOR IMPROVING COUGH     • [DISCONTINUED] sodium chloride 1 g tablet Take 1 tablet by mouth 3 (Three) Times a Day With Meals. (Patient taking differently: Take 1 g by mouth 2 (Two) Times a Day.) 42 tablet 0     No current facility-administered medications on file prior to visit.       ALLERGIES    Patient has no known allergies.    Past Medical History:   Diagnosis Date   • Coronary artery disease    • COVID-19 02/15/2022   • Diabetes mellitus (HCC)    • Hyperlipidemia    • Hypertension    • Sleep apnea     NO CPAP       Social History     Socioeconomic History   • Marital status: Single   • Number of children: 1   Tobacco Use   • Smoking status: Former     Packs/day: 1.00     Years: 30.00     Pack years: 30.00     Types: Cigarettes     Quit date: 2022     Years since quittin.0   • Smokeless tobacco: Never   Vaping Use   • Vaping Use: Never used   Substance and Sexual Activity   • Alcohol use: Never   • Drug use: Not Currently   • Sexual activity: Defer       Family History   Problem Relation Age of Onset   • Breast cancer Mother    • Hypertension Father    • Heart attack Father    • Heart attack Brother    • No Known Problems Maternal Grandmother    • No Known Problems Maternal Grandfather    • No Known Problems Paternal Grandmother    • Heart disease Paternal Grandfather        Review of Systems   Constitutional: Negative for appetite change, chills, fatigue and fever.   HENT: Negative for congestion, sinus pain and sore throat.    Eyes: Negative for visual disturbance.   Respiratory: Negative for cough, chest tightness, shortness of breath and wheezing.    Cardiovascular: Negative for chest pain, palpitations and leg swelling.   Gastrointestinal: Negative for abdominal pain, blood in stool, constipation, diarrhea, nausea and vomiting.   Endocrine: Negative for cold intolerance and heat intolerance.   Genitourinary: Negative for difficulty urinating, dysuria, frequency,  "hematuria and urgency.   Musculoskeletal: Negative for arthralgias, back pain, joint swelling, neck pain and neck stiffness.   Skin: Negative for color change, pallor, rash and wound.   Allergic/Immunologic: Negative for environmental allergies and food allergies.   Neurological: Negative for dizziness, syncope, weakness, light-headedness, numbness and headaches.   Hematological: Does not bruise/bleed easily.   Psychiatric/Behavioral: Negative for sleep disturbance.       Objective   /75 (BP Location: Left arm, Patient Position: Sitting)   Pulse 76   Temp 98.6 °F (37 °C)   Ht 193 cm (76\")   Wt 116 kg (255 lb)   SpO2 100%   BMI 31.04 kg/m²   Vitals:    01/12/23 1041   BP: 137/75   BP Location: Left arm   Patient Position: Sitting   Pulse: 76   Temp: 98.6 °F (37 °C)   SpO2: 100%   Weight: 116 kg (255 lb)   Height: 193 cm (76\")      Lab Results (most recent)     None        Physical Exam  Vitals reviewed.   Constitutional:       General: He is awake.      Appearance: Normal appearance. He is well-developed and well-groomed. He is obese.   HENT:      Head: Normocephalic.   Eyes:      General: Lids are normal.   Neck:      Vascular: No carotid bruit, hepatojugular reflux or JVD.   Cardiovascular:      Rate and Rhythm: Normal rate and regular rhythm.      Pulses:           Radial pulses are 2+ on the right side and 2+ on the left side.        Dorsalis pedis pulses are 2+ on the right side and 2+ on the left side.        Posterior tibial pulses are 2+ on the right side and 2+ on the left side.      Heart sounds: Normal heart sounds.   Pulmonary:      Effort: Pulmonary effort is normal.      Breath sounds: Normal breath sounds and air entry.   Abdominal:      General: Bowel sounds are normal.      Palpations: Abdomen is soft.   Musculoskeletal:      Right lower leg: No edema.      Left lower leg: No edema.   Skin:     General: Skin is warm and dry.   Neurological:      Mental Status: He is alert and oriented to " person, place, and time.   Psychiatric:         Attention and Perception: Attention and perception normal.         Mood and Affect: Mood and affect normal.         Speech: Speech normal.         Behavior: Behavior normal. Behavior is cooperative.         Thought Content: Thought content normal.         Cognition and Memory: Cognition and memory normal.         Judgment: Judgment normal.         Procedure   Procedures         Assessment & Plan      Diagnosis Plan   1. Coronary artery disease involving coronary bypass graft of native heart without angina pectoris  Ambulatory Referral to Cardiac Rehab    Adult Transthoracic Echo Complete W/ Cont if Necessary Per Protocol      2. Essential hypertension  Adult Transthoracic Echo Complete W/ Cont if Necessary Per Protocol      3. Grade I diastolic dysfunction  Adult Transthoracic Echo Complete W/ Cont if Necessary Per Protocol      4. Hyperlipidemia, unspecified hyperlipidemia type        5. Ischemic cardiomyopathy  Adult Transthoracic Echo Complete W/ Cont if Necessary Per Protocol      6. NSVT (nonsustained ventricular tachycardia)        7. PVC (premature ventricular contraction)        8. Shortness of breath  Adult Transthoracic Echo Complete W/ Cont if Necessary Per Protocol      9. Peripheral edema            Return in about 4 months (around 5/12/2023).    CAD-patient's on aspirin, Plavix, beta and statin.  Hypertension-patient's on amlodipine, metoprolol and Entresto and doing well.  Diastolic dysfunction/ischemic cardiomyopathy/peripheral edema-patient is on Entresto and beta-blocker with no signs of failure.  Hyperlipidemia-patient is on Crestor.  He does need to get repeat lipids.  SVT/PVCs-stable on beta-blocker.  Shortness of breath-resolved.  CAD/hypertension/diastolic dysfunction/ischemic cardiomyopathy/shortness of breath-patient have an echocardiogram in March and this is just to reassess his EF status post CABG.  He will continue his medication regimen.   He will follow-up in 4 months or sooner if any changes.  Also we did send a referral to [facility] for [facility] re[facility].       Stone Villeda  reports that he quit smoking about 2 weeks ago. His smoking use included cigarettes. He has a 30.00 pack-year smoking history. He has never used smokeless tobacco..     Patient brought in medicine list to appointment, it's been reviewed with patient and med list was updated in the chart.       Electronically signed by:

## 2023-01-12 NOTE — LETTER
January 12, 2023     Amita Crook, APRN  [address]  [facility] [address]    Patient: Stone Villeda   YOB: 1975   Date of Visit: 1/12/2023       Dear Dr. Crook, APRJESSICA:    Thank you for referring Stone Villeda to me for evaluation. Below are the relevant portions of my assessment and plan of care.    If you have questions, please do not hesitate to call me. I look forward to following Stone along with you.         Sincerely,        ELEANOR Keenan        CC: No Recipients  Lucy Flood APRN  01/12/23 1136  Signed  Subjective    Stone Villeda is a 47 y.o. male     Chief Complaint   Patient presents with   • Coronary Artery Disease       HPI    Problem list:    1.  CAD  1.1 left heart cath 7/19/2021 - 50% narrowing in the proximal portion of the LAD, diffuse irregularities throughout the circumflex with 30 to 50% narrowing before the PDA, right coronary artery totally occluded just proximal to the acute margin, however FFR was 0.85.  EF 40 to 45%, LVEDP 12-14  1.2 stress test 10/26/2021-no evidence of ischemia however there is a reversible redistribution pattern in the inferior wall compatible myocardial viability, post-rest EF 55% with global hypokinesis  1.3 stress test 12/6/2022-large, dense being completely reversible defect involving the inferior basilar and diaphragmatic segments with small extension into the inferolateral wall compatible with ischemia, post-rest EF 38% with global hypokinesis  1.4 left heart cath 12/9/2022-left main mild irregularities, LAD 60 to 70% stenosis, left circumflex 20-30 mid and distal, right coronary artery 99% stenosis, marginal branch 95 to 90% stenosis, FFR 0.78, hemodynamic significant LAD, subtotal occlusion of the mid RCA, consideration for CABG due to the fact patient is diabetic  1.5 CABG times 212/23/2022, Dr. Beltrán to the LAD, vein graft to the posterior descending branch  2.  Hypertension  3.  Hyperlipidemia  4.  Palpitations  4.1 event  monitor 6/10-6/23/2021-PVCs, NSVT (5 beat)  5.  Shortness of breath  5.1 Echo 10/26/2021-EF 60 to 65%, diastolic dysfunction 1  5.2 Echo 12/6/2022-EF 61 to 65%, moderate to severe LVH, diastolic dysfunction 2  6.  Family history of early CAD; Brother first MI @ 38; DAD CABG @ 49  7.  DARON, CPAP  8.  Diabetes Mellitus II; recently dx 2021  9.  Smoking Habituation (QUIT 12/23/2022)  10.  Renal artery ultrasound 6/21/2021-no hemodynamically significant renal artery stenosis    Patient is a 47-year-old male who presents today for follow-up status post CABG x2 with Dr. Walker.  Patient denies any chest pain, pressure, palpitations, fluttering, dizziness, presyncope, syncope, orthopnea, PND or edema.  He denies any shortness of breath with activity.  He says he feels very good.  Patient says he has not smoked since bypass.    Patient's midsternum scar looks great he has 1 scab in the top portion and he has a couple scabs where he had his chest tubes.  He also has a little bit of scabbing on his right lower extremity.  Current Outpatient Medications on File Prior to Visit   Medication Sig Dispense Refill   • amLODIPine (NORVASC) 10 MG tablet Take 10 mg by mouth Daily.     • aspirin 325 MG tablet Take 325 mg by mouth Daily. PT CURRENTLY TAKING THE 325MG     • cloNIDine (Catapres) 0.1 MG tablet Take 1 tablet by mouth 3 (Three) Times a Day As Needed for High Blood Pressure (SBP > 160 or DBP > 90). 30 tablet 5   • clopidogrel (PLAVIX) 75 MG tablet Take 1 tablet by mouth Daily. HOLD STARTING 12/18/2022 90 tablet 3   • HYDROcodone-acetaminophen (NORCO) 7.5-325 MG per tablet Take 1 tablet by mouth Every 6 (Six) Hours As Needed for Moderate Pain. 24 tablet 0   • metFORMIN ER (GLUCOPHAGE-XR) 500 MG 24 hr tablet Take 500 mg by mouth 2 (Two) Times a Day.     • metoprolol tartrate (LOPRESSOR) 50 MG tablet Take 1 tablet by mouth Every 12 (Twelve) Hours. 90 tablet 2   • nitroglycerin (NITROSTAT) 0.4 MG SL tablet 1 under the tongue as  needed for angina, may repeat q5mins for up three doses (Patient taking differently: Place 0.4 mg under the tongue Every 5 (Five) Minutes As Needed. 1 under the tongue as needed for angina, may repeat q5mins for up three doses  PT HAS NEVER TAKEN) 30 tablet 2   • rosuvastatin (CRESTOR) 20 MG tablet Take 1 tablet by mouth Daily. 90 tablet 3   • sacubitril-valsartan (ENTRESTO) 49-51 MG tablet Take 1 tablet by mouth 2 (Two) Times a Day. 60 tablet 11   • Ventolin  (90 Base) MCG/ACT inhaler Inhale 2 puffs Every 6 (Six) Hours As Needed for Wheezing. CURRENTLY USING DAILY FOR IMPROVING COUGH     • [DISCONTINUED] sodium chloride 1 g tablet Take 1 tablet by mouth 3 (Three) Times a Day With Meals. (Patient taking differently: Take 1 g by mouth 2 (Two) Times a Day.) 42 tablet 0     No current facility-administered medications on file prior to visit.       ALLERGIES    Patient has no known allergies.    Past Medical History:   Diagnosis Date   • Coronary artery disease    • COVID-19 02/15/2022   • Diabetes mellitus (HCC)    • Hyperlipidemia    • Hypertension    • Sleep apnea     NO CPAP       Social History     Socioeconomic History   • Marital status: Single   • Number of children: 1   Tobacco Use   • Smoking status: Former     Packs/day: 1.00     Years: 30.00     Pack years: 30.00     Types: Cigarettes     Quit date: 2022     Years since quittin.0   • Smokeless tobacco: Never   Vaping Use   • Vaping Use: Never used   Substance and Sexual Activity   • Alcohol use: Never   • Drug use: Not Currently   • Sexual activity: Defer       Family History   Problem Relation Age of Onset   • Breast cancer Mother    • Hypertension Father    • Heart attack Father    • Heart attack Brother    • No Known Problems Maternal Grandmother    • No Known Problems Maternal Grandfather    • No Known Problems Paternal Grandmother    • Heart disease Paternal Grandfather        Review of Systems   Constitutional: Negative for appetite  "change, chills, fatigue and fever.   HENT: Negative for congestion, sinus pain and sore throat.    Eyes: Negative for visual disturbance.   Respiratory: Negative for cough, chest tightness, shortness of breath and wheezing.    Cardiovascular: Negative for chest pain, palpitations and leg swelling.   Gastrointestinal: Negative for abdominal pain, blood in stool, constipation, diarrhea, nausea and vomiting.   Endocrine: Negative for cold intolerance and heat intolerance.   Genitourinary: Negative for difficulty urinating, dysuria, frequency, hematuria and urgency.   Musculoskeletal: Negative for arthralgias, back pain, joint swelling, neck pain and neck stiffness.   Skin: Negative for color change, pallor, rash and wound.   Allergic/Immunologic: Negative for environmental allergies and food allergies.   Neurological: Negative for dizziness, syncope, weakness, light-headedness, numbness and headaches.   Hematological: Does not bruise/bleed easily.   Psychiatric/Behavioral: Negative for sleep disturbance.       Objective    /75 (BP Location: Left arm, Patient Position: Sitting)   Pulse 76   Temp 98.6 °F (37 °C)   Ht 193 cm (76\")   Wt 116 kg (255 lb)   SpO2 100%   BMI 31.04 kg/m²   Vitals:    01/12/23 1041   BP: 137/75   BP Location: Left arm   Patient Position: Sitting   Pulse: 76   Temp: 98.6 °F (37 °C)   SpO2: 100%   Weight: 116 kg (255 lb)   Height: 193 cm (76\")      Lab Results (most recent)     None        Physical Exam  Vitals reviewed.   Constitutional:       General: He is awake.      Appearance: Normal appearance. He is well-developed and well-groomed. He is obese.   HENT:      Head: Normocephalic.   Eyes:      General: Lids are normal.   Neck:      Vascular: No carotid bruit, hepatojugular reflux or JVD.   Cardiovascular:      Rate and Rhythm: Normal rate and regular rhythm.      Pulses:           Radial pulses are 2+ on the right side and 2+ on the left side.        Dorsalis pedis pulses are 2+ on " the right side and 2+ on the left side.        Posterior tibial pulses are 2+ on the right side and 2+ on the left side.      Heart sounds: Normal heart sounds.   Pulmonary:      Effort: Pulmonary effort is normal.      Breath sounds: Normal breath sounds and air entry.   Abdominal:      General: Bowel sounds are normal.      Palpations: Abdomen is soft.   Musculoskeletal:      Right lower leg: No edema.      Left lower leg: No edema.   Skin:     General: Skin is warm and dry.   Neurological:      Mental Status: He is alert and oriented to person, place, and time.   Psychiatric:         Attention and Perception: Attention and perception normal.         Mood and Affect: Mood and affect normal.         Speech: Speech normal.         Behavior: Behavior normal. Behavior is cooperative.         Thought Content: Thought content normal.         Cognition and Memory: Cognition and memory normal.         Judgment: Judgment normal.         Procedure    Procedures        Assessment & Plan      Diagnosis Plan   1. Coronary artery disease involving coronary bypass graft of native heart without angina pectoris  Ambulatory Referral to Cardiac Rehab    Adult Transthoracic Echo Complete W/ Cont if Necessary Per Protocol      2. Essential hypertension  Adult Transthoracic Echo Complete W/ Cont if Necessary Per Protocol      3. Grade I diastolic dysfunction  Adult Transthoracic Echo Complete W/ Cont if Necessary Per Protocol      4. Hyperlipidemia, unspecified hyperlipidemia type        5. Ischemic cardiomyopathy  Adult Transthoracic Echo Complete W/ Cont if Necessary Per Protocol      6. NSVT (nonsustained ventricular tachycardia)        7. PVC (premature ventricular contraction)        8. Shortness of breath  Adult Transthoracic Echo Complete W/ Cont if Necessary Per Protocol      9. Peripheral edema            Return in about 4 months (around 5/12/2023).    CAD-patient's on aspirin, Plavix, beta and statin.  Hypertension-patient's  on amlodipine, metoprolol and Entresto and doing well.  Diastolic dysfunction/ischemic cardiomyopathy/peripheral edema-patient is on Entresto and beta-blocker with no signs of failure.  Hyperlipidemia-patient is on Crestor.  He does need to get repeat lipids.  SVT/PVCs-stable on beta-blocker.  Shortness of breath-resolved.  CAD/hypertension/diastolic dysfunction/ischemic cardiomyopathy/shortness of breath-patient have an echocardiogram in March and this is just to reassess his EF status post CABG.  He will continue his medication regimen.  He will follow-up in 4 months or sooner if any changes.  Also we did send a referral to [facility] for cardiac rehab.      Stone VALLEJO Ronal  reports that he quit smoking about 2 weeks ago. His smoking use included cigarettes. He has a 30.00 pack-year smoking history. He has never used smokeless tobacco..     Patient brought in medicine list to appointment, it's been reviewed with patient and med list was updated in the chart.       Electronically signed by:

## 2023-01-18 ENCOUNTER — READMISSION MANAGEMENT (OUTPATIENT)
Dept: CALL CENTER | Facility: HOSPITAL | Age: 48
End: 2023-01-18
Payer: COMMERCIAL

## 2023-01-18 NOTE — OUTREACH NOTE
CT Surgery Week 3 Survey    Flowsheet Row Responses   [facility] patient discharged from? [facility]   Does the patient have one of the following disease processes/diagnoses(primary or secondary)? Cardiothoracic surgery   Week 3 attempt successful? Yes   Call start time 1544   Call end time 1549   Comments regarding appointments Pt has followed up with cardiology. Pt did APRN at surgeon's office.   Has the patient kept scheduled appointments due by today? Yes   Week 3 call completed? Yes   Wrap up additional comments Pt reports feeling well. Pt has returned to work part time. Pt will begin cardiac rehab in February.          CHELE VALLEJO - Registered Nurse

## 2023-01-23 ENCOUNTER — OFFICE VISIT (OUTPATIENT)
Dept: CARDIAC SURGERY | Facility: CLINIC | Age: 48
End: 2023-01-23
Payer: COMMERCIAL

## 2023-01-23 VITALS
SYSTOLIC BLOOD PRESSURE: 138 MMHG | OXYGEN SATURATION: 99 % | HEART RATE: 78 BPM | BODY MASS INDEX: 31.42 KG/M2 | DIASTOLIC BLOOD PRESSURE: 91 MMHG | WEIGHT: 258 LBS | HEIGHT: 76 IN | TEMPERATURE: 98.4 F

## 2023-01-23 DIAGNOSIS — I25.10 CORONARY ARTERY DISEASE INVOLVING NATIVE CORONARY ARTERY OF NATIVE HEART WITHOUT ANGINA PECTORIS: ICD-10-CM

## 2023-01-23 DIAGNOSIS — Z95.1 S/P CABG (CORONARY ARTERY BYPASS GRAFT): Primary | ICD-10-CM

## 2023-01-23 PROCEDURE — 99024 POSTOP FOLLOW-UP VISIT: CPT | Performed by: NURSE PRACTITIONER

## 2023-01-23 PROCEDURE — 71046 X-RAY EXAM CHEST 2 VIEWS: CPT | Performed by: NURSE PRACTITIONER

## 2023-01-23 NOTE — PROGRESS NOTES
[facility] Cardiothoracic Surgery Office Follow Up Note     Date of Encounter: 2023     Name: Stone Villeda  : 1975     Referred By: No ref. provider found  PCP: Amita Crook APRN    Chief Complaint:    Chief Complaint   Patient presents with   • Post-op     Hospital follow up s/p CABG 22.       Subjective      History of Present Illness:    Stone Villeda is a 47 y.o. male s/p CABG x 2 per Dr. Walker 22.  PMH: Former smoker (quit 2022) CAD, type 2 diabetes, HTN, HLD, DARON not on CPAP, family history premature CAD.  Patient discharged home postop day #6.  No readmissions.  Patient followed up with cardiology on 2023.  Echocardiogram and Cardiac Rehab ordered then.  Patient checks BP at home and states well controlled 120/70 mmHg to 130/70 mm Hg.  Patient states he is feeling well.  He is anxious to resume driving and get back to work.      Review of Systems:  Review of Systems   Constitutional: Negative. Negative for chills, decreased appetite, fever and malaise/fatigue.   Cardiovascular: Negative.  Negative for chest pain, claudication, dyspnea on exertion, irregular heartbeat, leg swelling, near-syncope, orthopnea, palpitations and syncope.   Respiratory: Negative.  Negative for cough, hemoptysis, shortness of breath, sputum production and wheezing.    Hematologic/Lymphatic: Negative.  Negative for bleeding problem. Does not bruise/bleed easily.   Skin: Negative.  Negative for color change, poor wound healing and rash.   Musculoskeletal: Negative.  Negative for back pain, falls and joint pain.   Gastrointestinal: Negative.  Negative for abdominal pain, constipation, diarrhea, nausea and vomiting.   Neurological: Negative.  Negative for focal weakness, numbness and paresthesias.   Psychiatric/Behavioral: Negative.  Negative for depression. The patient does not have insomnia.        I have reviewed the following portions of the patient's history: allergies, current  medications, past family history, past medical history, past social history, past surgical history, problem list and ROS and confirm it's accurate.    Allergies:  No Known Allergies    Medications:      Current Outpatient Medications:   •  amLODIPine (NORVASC) 10 MG tablet, Take 10 mg by mouth Daily., Disp: , Rfl:   •  aspirin 325 MG tablet, Take 325 mg by mouth Daily. PT CURRENTLY TAKING THE 325MG, Disp: , Rfl:   •  cloNIDine (Catapres) 0.1 MG tablet, Take 1 tablet by mouth 3 (Three) Times a Day As Needed for High Blood Pressure (SBP > 160 or DBP > 90)., Disp: 30 tablet, Rfl: 5  •  clopidogrel (PLAVIX) 75 MG tablet, Take 1 tablet by mouth Daily. HOLD STARTING 12/18/2022, Disp: 90 tablet, Rfl: 3  •  metFORMIN ER (GLUCOPHAGE-XR) 500 MG 24 hr tablet, Take 500 mg by mouth 2 (Two) Times a Day., Disp: , Rfl:   •  metoprolol tartrate (LOPRESSOR) 50 MG tablet, Take 1 tablet by mouth Every 12 (Twelve) Hours., Disp: 90 tablet, Rfl: 2  •  nitroglycerin (NITROSTAT) 0.4 MG SL tablet, 1 under the tongue as needed for angina, may repeat q5mins for up three doses (Patient taking differently: Place 0.4 mg under the tongue Every 5 (Five) Minutes As Needed. 1 under the tongue as needed for angina, may repeat q5mins for up three doses PT HAS NEVER TAKEN), Disp: 30 tablet, Rfl: 2  •  rosuvastatin (CRESTOR) 20 MG tablet, Take 1 tablet by mouth Daily., Disp: 90 tablet, Rfl: 3  •  sacubitril-valsartan (ENTRESTO) 49-51 MG tablet, Take 1 tablet by mouth 2 (Two) Times a Day., Disp: 60 tablet, Rfl: 11  •  Ventolin  (90 Base) MCG/ACT inhaler, Inhale 2 puffs Every 6 (Six) Hours As Needed for Wheezing. CURRENTLY USING DAILY FOR IMPROVING COUGH, Disp: , Rfl:     History:   Past Medical History:   Diagnosis Date   • Coronary artery disease    • COVID-19 02/15/2022   • Diabetes mellitus (HCC)    • Hyperlipidemia    • Hypertension    • Sleep apnea     NO CPAP       Past Surgical History:   Procedure Laterality Date   • CARDIAC CATHETERIZATION  "     X2; NO INTERVENTION   • CHOLECYSTECTOMY     • CORONARY ARTERY BYPASS GRAFT N/A 2022    Procedure: MEDIAN STERNOTOMY CORONARY ARTERY BYPASS GRAFTING X 2  UTILIZING THE LEFT INTERNAL MAMMERY GRAFT WITH EVH OF THE GREATER RIGHT SAPHENOUS VEIN;  Surgeon: Norris Walker MD;  Location: [facility];  Service: Cardiothoracic;  Laterality: N/A;   • HAND SURGERY Right    • WISDOM TOOTH EXTRACTION         Social History     Socioeconomic History   • Marital status: Single   • Number of children: 1   Tobacco Use   • Smoking status: Former     Packs/day: 1.00     Years: 30.00     Pack years: 30.00     Types: Cigarettes     Quit date: 2022     Years since quittin.0   • Smokeless tobacco: Never   Vaping Use   • Vaping Use: Never used   Substance and Sexual Activity   • Alcohol use: Never   • Drug use: Not Currently   • Sexual activity: Defer        Family History   Problem Relation Age of Onset   • Breast cancer Mother    • Hypertension Father    • Heart attack Father    • Heart attack Brother    • No Known Problems Maternal Grandmother    • No Known Problems Maternal Grandfather    • No Known Problems Paternal Grandmother    • Heart disease Paternal Grandfather        Objective   Physical Exam:  Vitals:    23 1441 23 1442   BP: 144/96 138/91   BP Location: Right arm Left arm   Patient Position: Sitting Sitting   Pulse: 78    Temp: 98.4 °F (36.9 °C)    SpO2: 99%    Weight: 117 kg (258 lb)    Height: 193 cm (76\")       Body mass index is 31.4 kg/m².    Physical Exam  Vitals reviewed.   Constitutional:       General: He is not in acute distress.     Appearance: He is not toxic-appearing.   HENT:      Head: Normocephalic and atraumatic.   Eyes:      General: Lids are normal.      Conjunctiva/sclera: Conjunctivae normal.      Pupils: Pupils are equal, round, and reactive to light.   Neck:      Vascular: No carotid bruit.   Cardiovascular:      Rate and Rhythm: Normal rate and regular rhythm.      " Pulses: Normal pulses.           Dorsalis pedis pulses are 2+ on the right side and 2+ on the left side.        Posterior tibial pulses are 2+ on the right side and 2+ on the left side.      Heart sounds: S1 normal and S2 normal. No murmur heard.  Pulmonary:      Effort: Pulmonary effort is normal.      Breath sounds: Normal breath sounds. No wheezing, rhonchi or rales.   Chest:      Chest wall: No deformity, tenderness or crepitus.      Comments: Median sternotomy incision and MT sites healing well.  Sternum without instability to palpation or clicking.  Incision edges well approximated without dehiscence, tenderness, exudate or induration.    Musculoskeletal:         General: Normal range of motion.      Cervical back: Normal range of motion and neck supple.      Right lower leg: No edema.      Left lower leg: No edema.   Lymphadenopathy:      Cervical: No cervical adenopathy.   Skin:     General: Skin is warm and dry.      Capillary Refill: Capillary refill takes less than 2 seconds.      Comments: Right EVH site healing well.  No dehiscence, no induration, or tenderness   Neurological:      General: No focal deficit present.      Mental Status: He is alert and oriented to person, place, and time.   Psychiatric:         Mood and Affect: Mood normal.         Behavior: Behavior normal. Behavior is cooperative.                 Imaging/Labs:  CXR in clinic 1/23/23 personally reviewed: Sternal wires intact, no PTX, no pleural effusion.  Good aeration bilaterally.  Radiology review pending.     XR Chest 1 View: 12/27/2022  Support hardware projects unchanged. Bibasilar atelectasis persists. There is no new focal opacity. There is no distinct pneumothorax or significant effusion. Unchanged cardiac enlargement.  This report was finalized on 12/27/2022 8:10 AM by Landry Chicas.       Assessment / Plan      Assessment / Plan:  1. S/P CABG (coronary artery bypass graft) CAD  -47 y.o. male s/p CABG x 2 per Dr. Walker  12/30/22.    - PMH: Former smoker (quit 12/23/2022) CAD, type 2 diabetes, HTN, HLD, DARON not on CPAP, family history premature CAD.    - Discharged home postop day #6.  No readmissions.    - Patient followed up with cardiology on 1/12/2023.    - Echocardiogram and Cardiac Rehab ordered per Cardiology  - Home BP reportedly well controlled/ BP was controlled @ Cardiology visit  - Incisions well healed  - Reviewed sternal precautions  - CXR satisfactory today in clinic  - May follow up @ CT Surgery PRN       Patient Education:  • You may resume driving at 6 weeks from your surgery date.  Please plan to stop every 2 hours to ambulate if driving or flying long distances.  • No lifting over 10 lbs for 12 weeks (3 months).  After this time you can slowly increase your weight as tolerated.  • You should not partake in any overhead activities or movements that involve twisting or jarring of your upper chest and torso such as (but not limited to) -- golfing, tennis, lawn mowing including zero turn mowers, use of lawn trimmers or edgers, shoveling, painting, vacuuming, mopping, sweeping, shooting a gun, etc.  • Continue to keep your incisions clean and dry.  Use plain antibacterial soap (i.e. dial) and water to clean and pat dry.    • Do no apply any lotions, creams, oils, powders, salves, or ointments directly to incisional sites.  • Please watch for signs and symptoms of infection which may include but are not limited to: increased pain or tenderness around your incision, warmth at the site or fever, redness or red streaking, and foul smelling or appearing drainage.  Clear drainage and bloody drainage are not worrisome.  • If your incision opens up please contact our office.        Follow Up:   Return @ request of Cardiology.   Or sooner for any further concerns or worsening sign and symptoms. If unable to reach us in the office please dial 911 or go to the nearest emergency department.      ELEANOR Strauss  [facility] Cardiothoracic Surgery    Time Spent: I spent 28 minutes caring for Stone on this date of service. This time includes time spent by me in the following activities: preparing for the visit, reviewing tests, obtaining and/or reviewing a separately obtained history, performing a medically appropriate examination and/or evaluation, counseling and educating the patient/family/caregiver, documenting information in the medical record, independently interpreting results and communicating that information with the patient/family/caregiver and care coordination.

## 2023-01-26 ENCOUNTER — HOSPITAL ENCOUNTER (OUTPATIENT)
Dept: CARDIOLOGY | Facility: HOSPITAL | Age: 48
Discharge: HOME | End: 2023-01-26
Admitting: NURSE PRACTITIONER
Payer: COMMERCIAL

## 2023-01-26 DIAGNOSIS — I51.89 GRADE I DIASTOLIC DYSFUNCTION: ICD-10-CM

## 2023-01-26 DIAGNOSIS — I25.810 CORONARY ARTERY DISEASE INVOLVING CORONARY BYPASS GRAFT OF NATIVE HEART WITHOUT ANGINA PECTORIS: ICD-10-CM

## 2023-01-26 DIAGNOSIS — I10 ESSENTIAL HYPERTENSION: ICD-10-CM

## 2023-01-26 DIAGNOSIS — I25.5 ISCHEMIC CARDIOMYOPATHY: ICD-10-CM

## 2023-01-26 DIAGNOSIS — R06.02 SHORTNESS OF BREATH: ICD-10-CM

## 2023-01-26 PROCEDURE — 93306 TTE W/DOPPLER COMPLETE: CPT

## 2023-01-26 PROCEDURE — 93306 TTE W/DOPPLER COMPLETE: CPT | Performed by: SPECIALIST

## 2023-02-07 LAB
AORTIC DIMENSIONLESS INDEX: 1 (DI)
BH CV ECHO MEAS - ACS: 1.87 CM
BH CV ECHO MEAS - AO MAX PG: 5.8 MMHG
BH CV ECHO MEAS - AO MEAN PG: 3.6 MMHG
BH CV ECHO MEAS - AO ROOT DIAM: 3.3 CM
BH CV ECHO MEAS - AO V2 MAX: 120.8 CM/SEC
BH CV ECHO MEAS - AO V2 VTI: 29 CM
BH CV ECHO MEAS - AVA(I,D): 4.1 CM2
BH CV ECHO MEAS - EDV(CUBED): 49.4 ML
BH CV ECHO MEAS - EDV(MOD-SP4): 124 ML
BH CV ECHO MEAS - EF(MOD-SP4): 65.7 %
BH CV ECHO MEAS - EF_3D-VOL: 49 %
BH CV ECHO MEAS - ESV(CUBED): 17 ML
BH CV ECHO MEAS - ESV(MOD-SP4): 42.5 ML
BH CV ECHO MEAS - FS: 29.9 %
BH CV ECHO MEAS - IVS/LVPW: 0.93 CM
BH CV ECHO MEAS - IVSD: 1.72 CM
BH CV ECHO MEAS - LA A2CS (ATRIAL LENGTH): 6.2 CM
BH CV ECHO MEAS - LA A4C LENGTH: 6.1 CM
BH CV ECHO MEAS - LA DIMENSION: 4.9 CM
BH CV ECHO MEAS - LAT PEAK E' VEL: 6 CM/SEC
BH CV ECHO MEAS - LV DIASTOLIC VOL/BSA (35-75): 50.2 CM2
BH CV ECHO MEAS - LV MASS(C)D: 274.5 GRAMS
BH CV ECHO MEAS - LV MAX PG: 5.9 MMHG
BH CV ECHO MEAS - LV MEAN PG: 3.7 MMHG
BH CV ECHO MEAS - LV SYSTOLIC VOL/BSA (12-30): 17.2 CM2
BH CV ECHO MEAS - LV V1 MAX: 121.2 CM/SEC
BH CV ECHO MEAS - LV V1 VTI: 27.7 CM
BH CV ECHO MEAS - LVIDD: 3.7 CM
BH CV ECHO MEAS - LVIDS: 2.6 CM
BH CV ECHO MEAS - LVOT AREA: 4.3 CM2
BH CV ECHO MEAS - LVOT DIAM: 2.34 CM
BH CV ECHO MEAS - LVPWD: 1.85 CM
BH CV ECHO MEAS - MED PEAK E' VEL: 3.5 CM/SEC
BH CV ECHO MEAS - MV A MAX VEL: 55.5 CM/SEC
BH CV ECHO MEAS - MV DEC SLOPE: 538.4 CM/SEC2
BH CV ECHO MEAS - MV DEC TIME: 0.26 MSEC
BH CV ECHO MEAS - MV E MAX VEL: 84.5 CM/SEC
BH CV ECHO MEAS - MV E/A: 1.52
BH CV ECHO MEAS - MV MAX PG: 6 MMHG
BH CV ECHO MEAS - MV MEAN PG: 2.07 MMHG
BH CV ECHO MEAS - MV P1/2T: 67.3 MSEC
BH CV ECHO MEAS - MV V2 VTI: 34.4 CM
BH CV ECHO MEAS - MVA(P1/2T): 3.3 CM2
BH CV ECHO MEAS - MVA(VTI): 3.5 CM2
BH CV ECHO MEAS - PA V2 MAX: 94.7 CM/SEC
BH CV ECHO MEAS - RV MAX PG: 1.93 MMHG
BH CV ECHO MEAS - RV V1 MAX: 69.5 CM/SEC
BH CV ECHO MEAS - RV V1 VTI: 15.6 CM
BH CV ECHO MEAS - RVDD: 3.8 CM
BH CV ECHO MEAS - SI(MOD-SP4): 33 ML/M2
BH CV ECHO MEAS - SV(LVOT): 119.4 ML
BH CV ECHO MEAS - SV(MOD-SP4): 81.5 ML
BH CV ECHO MEASUREMENTS AVERAGE E/E' RATIO: 17.79
LEFT ATRIUM VOLUME INDEX: 17.6 ML/M2
LEFT ATRIUM VOLUME: 43.4 ML
MAXIMAL PREDICTED HEART RATE: 174 BPM
STRESS TARGET HR: 148 BPM

## 2023-02-08 ENCOUNTER — TELEPHONE (OUTPATIENT)
Dept: CARDIOLOGY | Facility: CLINIC | Age: 48
End: 2023-02-08
Payer: COMMERCIAL

## 2023-02-08 NOTE — TELEPHONE ENCOUNTER
Echo  Pt notified of no acute findings. Provider will discuss results at f/u. Pt reminded of appt date and time.  ----- Message from ELEANOR Keenan sent at 2/7/2023  6:08 PM EST -----  Please advise patient.  EF 61 to 65%.

## 2023-02-09 ENCOUNTER — TELEPHONE (OUTPATIENT)
Dept: CARDIOLOGY | Facility: CLINIC | Age: 48
End: 2023-02-09
Payer: COMMERCIAL

## 2023-02-09 NOTE — TELEPHONE ENCOUNTER
Caller: [facility]    Best call back number: [phone]    What was the call regarding: [facility]CC[facility] WITH [facility] CALLING TO GET A SIGNATURE FROM PROVIDER ON REFERRAL ORDER FOR PATIENT. FAX #[phone]. REACH OUT TO HER WITH ANY OTHER QUESTIONS OR CONCERNS.

## 2023-02-09 NOTE — TELEPHONE ENCOUNTER
Per chart review, Lucy entered order on 1/12/23 to cardiac rehab.     I called and was told that cardiac rehab orders have to come from [facility].

## 2023-07-25 ENCOUNTER — OFFICE VISIT (OUTPATIENT)
Dept: CARDIOLOGY | Facility: CLINIC | Age: 48
End: 2023-07-25
Payer: COMMERCIAL

## 2023-07-25 VITALS
HEART RATE: 70 BPM | OXYGEN SATURATION: 99 % | SYSTOLIC BLOOD PRESSURE: 156 MMHG | WEIGHT: 285.2 LBS | BODY MASS INDEX: 34.73 KG/M2 | DIASTOLIC BLOOD PRESSURE: 89 MMHG | HEIGHT: 76 IN

## 2023-07-25 DIAGNOSIS — R94.31 ABNORMAL EKG: ICD-10-CM

## 2023-07-25 DIAGNOSIS — I10 ESSENTIAL HYPERTENSION: ICD-10-CM

## 2023-07-25 DIAGNOSIS — I25.810 CORONARY ARTERY DISEASE INVOLVING CORONARY BYPASS GRAFT OF NATIVE HEART WITHOUT ANGINA PECTORIS: Primary | ICD-10-CM

## 2023-07-25 DIAGNOSIS — I25.5 ISCHEMIC CARDIOMYOPATHY: ICD-10-CM

## 2023-07-25 PROCEDURE — 93000 ELECTROCARDIOGRAM COMPLETE: CPT | Performed by: NURSE PRACTITIONER

## 2023-07-25 PROCEDURE — 3079F DIAST BP 80-89 MM HG: CPT | Performed by: NURSE PRACTITIONER

## 2023-07-25 PROCEDURE — 99214 OFFICE O/P EST MOD 30 MIN: CPT | Performed by: NURSE PRACTITIONER

## 2023-07-25 PROCEDURE — 1160F RVW MEDS BY RX/DR IN RCRD: CPT | Performed by: NURSE PRACTITIONER

## 2023-07-25 PROCEDURE — 3077F SYST BP >= 140 MM HG: CPT | Performed by: NURSE PRACTITIONER

## 2023-07-25 PROCEDURE — 1159F MED LIST DOCD IN RCRD: CPT | Performed by: NURSE PRACTITIONER

## 2023-07-25 RX ORDER — SPIRONOLACTONE 25 MG/1
25 TABLET ORAL DAILY
Qty: 30 TABLET | Refills: 6 | Status: SHIPPED | OUTPATIENT
Start: 2023-07-25

## 2023-07-25 NOTE — PROGRESS NOTES
Subjective     Stone Villeda is a 48 y.o. male who presents to day for 3 month follow up .    CHIEF COMPLIANT  Chief Complaint   Patient presents with    3 month follow up        Active Problems:  Problem List Items Addressed This Visit          Cardiac and Vasculature    Ischemic cardiomyopathy    Relevant Orders    Stress Test With Myocardial Perfusion One Day    Adult Transthoracic Echo Complete W/ Cont if Necessary Per Protocol     Other Visit Diagnoses       Coronary artery disease involving coronary bypass graft of native heart without angina pectoris    -  Primary    Relevant Orders    Stress Test With Myocardial Perfusion One Day    Adult Transthoracic Echo Complete W/ Cont if Necessary Per Protocol    Abnormal EKG        Relevant Orders    Stress Test With Myocardial Perfusion One Day    Adult Transthoracic Echo Complete W/ Cont if Necessary Per Protocol        Problem list:     1.  CAD  1.1 left heart cath 7/19/2021 - 50% narrowing in the proximal portion of the LAD, diffuse irregularities throughout the circumflex with 30 to 50% narrowing before the PDA, right coronary artery totally occluded just proximal to the acute margin, however FFR was 0.85.  EF 40 to 45%, LVEDP 12-14  1.2 stress test 10/26/2021-no evidence of ischemia however there is a reversible redistribution pattern in the inferior wall compatible myocardial viability, post-rest EF 55% with global hypokinesis  1.3 stress test 12/6/2022-large, dense being completely reversible defect involving the inferior basilar and diaphragmatic segments with small extension into the inferolateral wall compatible with ischemia, post-rest EF 38% with global hypokinesis  1.4 left heart cath 12/9/2022-left main mild irregularities, LAD 60 to 70% stenosis, left circumflex 20-30 mid and distal, right coronary artery 99% stenosis, marginal branch 95 to 90% stenosis, FFR 0.78, hemodynamic significant LAD, subtotal occlusion of the mid RCA, consideration for CABG  "due to the fact patient is diabetic  1.5 CABG times 212/23/2022, Dr. Walker-AMAYA to the LAD, vein graft to the posterior descending branch  2.  Hypertension  3.  Hyperlipidemia  4.  Palpitations  4.1 event monitor 6/10-6/23/2021-PVCs, NSVT (5 beat)  5.  Shortness of breath  5.1 Echo 10/26/2021-EF 60 to 65%, diastolic dysfunction 1  5.2 Echo 12/6/2022-EF 61 to 65%, moderate to severe LVH, diastolic dysfunction 2  6.  Family history of early CAD; Brother first MI @ 38; DAD CABG @ 49  7.  DARON, CPAP    10.  Renal artery ultrasound 6/21/2021-no hemodynamically significant renal artery stenosi    HPI  HPI    Stone Villeda is a 48-year-old male being followed up today for coronary artery disease. He did have coronary artery bypass grafting x2 in 12/2022 with [place] to LAD, vein graft to PDA. He is on high intensity statin therapy of rosuvastatin, dual antiplatelet therapy, of aspirin, and Plavix.     He also has chronic arterial hypertension in which his blood pressure is elevated today at 156/89 mmHg, heart rate of 70 bpm. He has been treated with amlodipine, metoprolol and Entresto. Patient does have a history of heart failure with reduced ejection fraction however that is recovered with the most recent echocardiogram being in 12/2022 at 61 to 65 percent, however, he did have severe LVH and diastolic dysfunction. He is not on diuretic therapy. His EKG obtained in the office today and revealed T wave inversion, ST depression which could be indicative of a blockage in the inferior lateral leads.      He denies angina, chest pressure, chest tightness, left arm pain, shortness of breath or left law pain.     The patient states he has been feeling well recently. He never had symptoms before his bypass surgery. He states he is able to do any activity with no limits.     His right ankle edema is worse than his left ankle. He states he has had \"bad ankles\" since he was in the hospital.     PRIOR MEDS  Current Outpatient " Medications on File Prior to Visit   Medication Sig Dispense Refill    amLODIPine (NORVASC) 10 MG tablet Take 1 tablet by mouth Daily.      aspirin 325 MG tablet Take 1 tablet by mouth Daily. PT CURRENTLY TAKING THE 325MG      cloNIDine (Catapres) 0.1 MG tablet Take 1 tablet by mouth 3 (Three) Times a Day As Needed for High Blood Pressure (SBP > 160 or DBP > 90). 30 tablet 5    clopidogrel (PLAVIX) 75 MG tablet Take 1 tablet by mouth Daily. HOLD STARTING 12/18/2022 90 tablet 3    metFORMIN ER (GLUCOPHAGE-XR) 500 MG 24 hr tablet Take 1 tablet by mouth 2 (Two) Times a Day.      metoprolol tartrate (LOPRESSOR) 50 MG tablet Take 1 tablet by mouth Every 12 (Twelve) Hours. 90 tablet 2    nitroglycerin (NITROSTAT) 0.4 MG SL tablet 1 under the tongue as needed for angina, may repeat q5mins for up three doses (Patient taking differently: Place 1 tablet under the tongue Every 5 (Five) Minutes As Needed. 1 under the tongue as needed for angina, may repeat q5mins for up three doses  PT HAS NEVER TAKEN) 30 tablet 2    rosuvastatin (CRESTOR) 20 MG tablet Take 1 tablet by mouth Daily. 90 tablet 3    sacubitril-valsartan (ENTRESTO) 49-51 MG tablet Take 1 tablet by mouth 2 (Two) Times a Day. 60 tablet 11    [DISCONTINUED] Ventolin  (90 Base) MCG/ACT inhaler Inhale 2 puffs Every 6 (Six) Hours As Needed for Wheezing. CURRENTLY USING DAILY FOR IMPROVING COUGH       No current facility-administered medications on file prior to visit.       ALLERGIES  Patient has no known allergies.    HISTORY  Past Medical History:   Diagnosis Date    Coronary artery disease     COVID-19 02/15/2022    Diabetes mellitus     Hyperlipidemia     Hypertension     Sleep apnea     NO CPAP       Social History     Socioeconomic History    Marital status: Single    Number of children: 1   Tobacco Use    Smoking status: Former     Packs/day: 1.00     Years: 30.00     Pack years: 30.00     Types: Cigarettes     Quit date: 12/23/2022     Years since  "quittin.5    Smokeless tobacco: Never   Vaping Use    Vaping Use: Never used   Substance and Sexual Activity    Alcohol use: Never    Drug use: Not Currently    Sexual activity: Defer       Family History   Problem Relation Age of Onset    Breast cancer Mother     Hypertension Father     Heart attack Father     Heart attack Brother     No Known Problems Maternal Grandmother     No Known Problems Maternal Grandfather     No Known Problems Paternal Grandmother     Heart disease Paternal Grandfather        Review of Systems   Constitutional:  Negative for chills, fatigue and fever.   HENT:  Negative for congestion, rhinorrhea and sore throat.    Eyes:  Negative for visual disturbance.   Respiratory:  Positive for apnea (does not wear C PAP can not tolerate his mask). Negative for chest tightness and shortness of breath.    Cardiovascular:  Positive for leg swelling (mild swelling feet and ankles more Right than left). Negative for chest pain and palpitations.   Gastrointestinal:  Negative for constipation, diarrhea and nausea.   Musculoskeletal:  Negative for arthralgias, back pain and neck pain.   Allergic/Immunologic: Positive for environmental allergies. Negative for food allergies.   Neurological:  Negative for dizziness, syncope, weakness and light-headedness.   Hematological:  Does not bruise/bleed easily.   Psychiatric/Behavioral:  Negative for sleep disturbance.      Objective     VITALS: /89 (BP Location: Left arm, Patient Position: Sitting, Cuff Size: Adult)   Pulse 70   Ht 193 cm (75.98\")   Wt 129 kg (285 lb 3.2 oz)   SpO2 99%   BMI 34.73 kg/m²     LABS:   Lab Results (most recent)       None            IMAGING:   No Images in the past 120 days found..    EXAM:  Physical Exam  Vitals and nursing note reviewed.   Constitutional:       Appearance: He is well-developed.   HENT:      Head: Normocephalic.   Neck:      Thyroid: No thyroid mass.      Vascular: No carotid bruit or JVD.      Trachea: " Trachea and phonation normal.   Cardiovascular:      Rate and Rhythm: Normal rate and regular rhythm.      Pulses:           Radial pulses are 2+ on the right side and 2+ on the left side.        Posterior tibial pulses are 2+ on the right side and 2+ on the left side.      Heart sounds: Normal heart sounds. No murmur heard.    No friction rub. No gallop.   Pulmonary:      Effort: Pulmonary effort is normal. No respiratory distress.      Breath sounds: Normal breath sounds. No wheezing or rales.   Musculoskeletal:         General: No swelling. Normal range of motion.      Cervical back: Neck supple.   Skin:     General: Skin is warm and dry.      Capillary Refill: Capillary refill takes less than 2 seconds.      Findings: No rash.   Neurological:      Mental Status: He is alert and oriented to person, place, and time.   Psychiatric:         Speech: Speech normal.         Behavior: Behavior normal.         Thought Content: Thought content normal.         Judgment: Judgment normal.       Procedure     ECG 12 Lead    Date/Time: 7/25/2023 8:23 AM  Performed by: Landry Walker APRN  Authorized by: Landry Walker APRN   Comparison: compared with previous ECG from 12/26/2022  Comparison to previous ECG: ST depression in the inferior lateral leads  New right axis  Rhythm: sinus rhythm  Rate: normal  ST Depression: II, III, aVF, V5 and V6  T inversion: II, III, aVF, V5 and V6  QRS axis: right    Clinical impression: abnormal EKG  Comments: QTc 421 ms           Assessment & Plan    Diagnosis Plan   1. Coronary artery disease involving coronary bypass graft of native heart without angina pectoris  Stress Test With Myocardial Perfusion One Day    Adult Transthoracic Echo Complete W/ Cont if Necessary Per Protocol      2. Ischemic cardiomyopathy  Stress Test With Myocardial Perfusion One Day    Adult Transthoracic Echo Complete W/ Cont if Necessary Per Protocol      3. Abnormal EKG  Stress Test With Myocardial Perfusion One  Day    Adult Transthoracic Echo Complete W/ Cont if Necessary Per Protocol          1. The patient presents for follow up with no current cardiac symptoms but with changes noted on his EKG obtained in the office today. His EKG obtained in the office today and revealed T wave inversion, ST depression which could be indicative of a blockage in the inferior lateral leads. He reports he was asymptomatic prior to his bypass surgery. He will have a STAT stress test obtained for further evaluation of his EKG changes.  Attempted to do stress test today however due to consuming 220 ounce Mountain Dew's we will schedule in the near future.  2. He was prescribed spironolactone to take for his edema and congestive heart failure. Side effects of the medication were discussed at length with the patient.  This will also help better control his blood pressure.  3. I patient will continue the metoprolol Entresto and start Aldactone for his history of heart failure with reduced ejection fraction.  We will also repeat a stat echo due to EKG changes as well.  4.  Patient will monitor his blood pressure on a routine basis report any significant highs or lows.  5.  Informed of signs and symptoms of ACS and advised to seek emergent treatment for any new worsening symptoms.  Patient also advised sooner follow-up as needed.  Also advised to follow-up with family doctor as needed  This note is dictated utilizing voice recognition software.  Although this record has been proof read, transcriptional errors may still be present. If questions occur regarding the content of this record please do not hesitate to call our office.  I have reviewed and confirmed the accuracy of the ROS as documented by the MA/LPN/RN ELEANOR Becker    Return if symptoms worsen or fail to improve, for Next scheduled follow up.    Diagnoses and all orders for this visit:    1. Coronary artery disease involving coronary bypass graft of native heart without angina  pectoris (Primary)  -     Stress Test With Myocardial Perfusion One Day; Future  -     Adult Transthoracic Echo Complete W/ Cont if Necessary Per Protocol; Future    2. Ischemic cardiomyopathy  -     Stress Test With Myocardial Perfusion One Day; Future  -     Adult Transthoracic Echo Complete W/ Cont if Necessary Per Protocol; Future    3. Abnormal EKG  -     Stress Test With Myocardial Perfusion One Day; Future  -     Adult Transthoracic Echo Complete W/ Cont if Necessary Per Protocol; Future    Other orders  -     ECG 12 Lead  -     spironolactone (ALDACTONE) 25 MG tablet; Take 1 tablet by mouth Daily.  Dispense: 30 tablet; Refill: 6        Stone Villeda  reports that he quit smoking about 7 months ago. His smoking use included cigarettes. He has a 30.00 pack-year smoking history. He has never used smokeless tobacco.. I have educated him on the risk of diseases from using tobacco products.         MEDS ORDERED DURING VISIT:  New Medications Ordered This Visit   Medications    spironolactone (ALDACTONE) 25 MG tablet     Sig: Take 1 tablet by mouth Daily.     Dispense:  30 tablet     Refill:  6           This document has been electronically signed by ELEANOR Becker Jr.  July 25, 2023 10:24 EDT      Transcribed from ambient dictation for ELEANOR Becker by Giselle Jarrett [employer] .  07/25/23   09:50 EDT    Patient or patient representative verbalized consent to the visit recording.  I have personally performed the services described in this document as transcribed by the above individual, and it is both accurate and complete.

## 2023-07-26 ENCOUNTER — HOSPITAL ENCOUNTER (OUTPATIENT)
Dept: CARDIOLOGY | Facility: HOSPITAL | Age: 48
Discharge: HOME | End: 2023-07-26
Payer: COMMERCIAL

## 2023-07-26 VITALS — BODY MASS INDEX: 34.63 KG/M2 | HEIGHT: 76 IN | WEIGHT: 284.39 LBS

## 2023-07-26 DIAGNOSIS — I25.5 ISCHEMIC CARDIOMYOPATHY: ICD-10-CM

## 2023-07-26 DIAGNOSIS — I25.810 CORONARY ARTERY DISEASE INVOLVING CORONARY BYPASS GRAFT OF NATIVE HEART WITHOUT ANGINA PECTORIS: ICD-10-CM

## 2023-07-26 DIAGNOSIS — R94.31 ABNORMAL EKG: ICD-10-CM

## 2023-07-26 LAB
AORTIC DIMENSIONLESS INDEX: 0.91 (DI)
BH CV ECHO MEAS - ACS: 1.48 CM
BH CV ECHO MEAS - AO MAX PG: 6.5 MMHG
BH CV ECHO MEAS - AO MEAN PG: 4 MMHG
BH CV ECHO MEAS - AO ROOT DIAM: 3 CM
BH CV ECHO MEAS - AO V2 MAX: 127 CM/SEC
BH CV ECHO MEAS - AO V2 VTI: 26.9 CM
BH CV ECHO MEAS - EDV(CUBED): 116.9 ML
BH CV ECHO MEAS - EF(MOD-BP): 62 %
BH CV ECHO MEAS - ESV(CUBED): 35.2 ML
BH CV ECHO MEAS - FS: 33 %
BH CV ECHO MEAS - IVS/LVPW: 1.02 CM
BH CV ECHO MEAS - IVSD: 1.27 CM
BH CV ECHO MEAS - LA DIMENSION: 4.2 CM
BH CV ECHO MEAS - LAT PEAK E' VEL: 8.1 CM/SEC
BH CV ECHO MEAS - LV MASS(C)D: 240.5 GRAMS
BH CV ECHO MEAS - LV MAX PG: 5.3 MMHG
BH CV ECHO MEAS - LV MEAN PG: 2 MMHG
BH CV ECHO MEAS - LV V1 MAX: 115 CM/SEC
BH CV ECHO MEAS - LV V1 VTI: 21.3 CM
BH CV ECHO MEAS - LVIDD: 4.9 CM
BH CV ECHO MEAS - LVIDS: 3.3 CM
BH CV ECHO MEAS - LVPWD: 1.24 CM
BH CV ECHO MEAS - MED PEAK E' VEL: 5.7 CM/SEC
BH CV ECHO MEAS - MV A MAX VEL: 55.2 CM/SEC
BH CV ECHO MEAS - MV DEC SLOPE: 445.3 CM/SEC2
BH CV ECHO MEAS - MV DEC TIME: 0.19 MSEC
BH CV ECHO MEAS - MV E MAX VEL: 87.8 CM/SEC
BH CV ECHO MEAS - MV E/A: 1.59
BH CV ECHO MEAS - MV MAX PG: 4 MMHG
BH CV ECHO MEAS - MV MEAN PG: 1.82 MMHG
BH CV ECHO MEAS - MV P1/2T: 64.8 MSEC
BH CV ECHO MEAS - MV V2 VTI: 28.1 CM
BH CV ECHO MEAS - MVA(P1/2T): 3.4 CM2
BH CV ECHO MEAS - PA V2 MAX: 91.8 CM/SEC
BH CV ECHO MEAS - RAP SYSTOLE: 8 MMHG
BH CV ECHO MEAS - RV MAX PG: 1.1 MMHG
BH CV ECHO MEAS - RV V1 MAX: 52.4 CM/SEC
BH CV ECHO MEAS - RV V1 VTI: 12 CM
BH CV ECHO MEAS - RVSP: 12.4 MMHG
BH CV ECHO MEAS - TAPSE (>1.6): 1.39 CM
BH CV ECHO MEAS - TR MAX PG: 4.4 MMHG
BH CV ECHO MEAS - TR MAX VEL: 105.3 CM/SEC
BH CV ECHO MEASUREMENTS AVERAGE E/E' RATIO: 12.72
BH CV XLRA - TDI S': 5.2 CM/SEC
SINUS: 3.1 CM

## 2023-07-26 PROCEDURE — A9500 TC99M SESTAMIBI: HCPCS | Performed by: INTERNAL MEDICINE

## 2023-07-26 PROCEDURE — 25010000002 REGADENOSON 0.4 MG/5ML SOLUTION: Performed by: INTERNAL MEDICINE

## 2023-07-26 PROCEDURE — 93017 CV STRESS TEST TRACING ONLY: CPT

## 2023-07-26 PROCEDURE — 0 TECHNETIUM SESTAMIBI: Performed by: INTERNAL MEDICINE

## 2023-07-26 PROCEDURE — 78452 HT MUSCLE IMAGE SPECT MULT: CPT

## 2023-07-26 PROCEDURE — 93306 TTE W/DOPPLER COMPLETE: CPT

## 2023-07-26 RX ORDER — REGADENOSON 0.08 MG/ML
0.4 INJECTION, SOLUTION INTRAVENOUS
Status: COMPLETED | OUTPATIENT
Start: 2023-07-26 | End: 2023-07-26

## 2023-07-26 RX ADMIN — TECHNETIUM TC 99M SESTAMIBI 1 DOSE: 1 INJECTION INTRAVENOUS at 10:50

## 2023-07-26 RX ADMIN — REGADENOSON 0.4 MG: 0.08 INJECTION, SOLUTION INTRAVENOUS at 10:50

## 2023-07-26 RX ADMIN — TECHNETIUM TC 99M SESTAMIBI 1 DOSE: 1 INJECTION INTRAVENOUS at 09:20

## 2023-07-31 LAB
BH CV REST NUCLEAR ISOTOPE DOSE: 10 MCI
BH CV STRESS COMMENTS STAGE 1: NORMAL
BH CV STRESS DOSE REGADENOSON STAGE 1: 0.4
BH CV STRESS DURATION MIN STAGE 1: 0
BH CV STRESS DURATION SEC STAGE 1: 10
BH CV STRESS NUCLEAR ISOTOPE DOSE: 30 MCI
BH CV STRESS PROTOCOL 1: NORMAL
BH CV STRESS RECOVERY BP: NORMAL MMHG
BH CV STRESS RECOVERY HR: 78 BPM
BH CV STRESS STAGE 1: 1
MAXIMAL PREDICTED HEART RATE: 173 BPM
PERCENT MAX PREDICTED HR: 51.45 %
STRESS BASELINE BP: NORMAL MMHG
STRESS BASELINE HR: 70 BPM
STRESS PERCENT HR: 61 %
STRESS POST PEAK BP: NORMAL MMHG
STRESS POST PEAK HR: 89 BPM
STRESS TARGET HR: 147 BPM

## 2023-08-01 ENCOUNTER — TELEPHONE (OUTPATIENT)
Dept: CARDIOLOGY | Facility: CLINIC | Age: 48
End: 2023-08-01
Payer: COMMERCIAL

## 2023-08-09 ENCOUNTER — TELEPHONE (OUTPATIENT)
Dept: CARDIOLOGY | Facility: CLINIC | Age: 48
End: 2023-08-09
Payer: COMMERCIAL

## 2023-08-09 NOTE — TELEPHONE ENCOUNTER
For the HUB to read to pt:       CALLED PT TO CONFIRM APPT, VM FULL UNABLE TO LEAVE A MESSAGE. IF PT CALLS BACK PLEASE PUT CALL THROUGH, THANK YOU

## 2023-08-10 ENCOUNTER — OFFICE VISIT (OUTPATIENT)
Dept: CARDIOLOGY | Facility: CLINIC | Age: 48
End: 2023-08-10
Payer: COMMERCIAL

## 2023-08-10 VITALS
WEIGHT: 281.2 LBS | HEART RATE: 79 BPM | DIASTOLIC BLOOD PRESSURE: 86 MMHG | BODY MASS INDEX: 34.24 KG/M2 | HEIGHT: 76 IN | OXYGEN SATURATION: 98 % | SYSTOLIC BLOOD PRESSURE: 156 MMHG

## 2023-08-10 DIAGNOSIS — I25.5 ISCHEMIC CARDIOMYOPATHY: ICD-10-CM

## 2023-08-10 DIAGNOSIS — R94.39 POSITIVE CARDIAC STRESS TEST: ICD-10-CM

## 2023-08-10 DIAGNOSIS — T63.441A BEE STING REACTION, ACCIDENTAL OR UNINTENTIONAL, INITIAL ENCOUNTER: ICD-10-CM

## 2023-08-10 DIAGNOSIS — I25.810 CORONARY ARTERY DISEASE INVOLVING CORONARY BYPASS GRAFT OF NATIVE HEART WITHOUT ANGINA PECTORIS: Primary | ICD-10-CM

## 2023-08-10 DIAGNOSIS — I10 ESSENTIAL HYPERTENSION: ICD-10-CM

## 2023-08-10 PROCEDURE — 1160F RVW MEDS BY RX/DR IN RCRD: CPT | Performed by: NURSE PRACTITIONER

## 2023-08-10 PROCEDURE — 3077F SYST BP >= 140 MM HG: CPT | Performed by: NURSE PRACTITIONER

## 2023-08-10 PROCEDURE — 99214 OFFICE O/P EST MOD 30 MIN: CPT | Performed by: NURSE PRACTITIONER

## 2023-08-10 PROCEDURE — 3079F DIAST BP 80-89 MM HG: CPT | Performed by: NURSE PRACTITIONER

## 2023-08-10 PROCEDURE — 1159F MED LIST DOCD IN RCRD: CPT | Performed by: NURSE PRACTITIONER

## 2023-08-10 RX ORDER — METHYLPREDNISOLONE 4 MG/1
TABLET ORAL
Qty: 21 TABLET | Refills: 0 | Status: SHIPPED | OUTPATIENT
Start: 2023-08-10

## 2023-08-10 NOTE — PROGRESS NOTES
Subjective     Stone Villeda is a 48 y.o. male who presents to day for Abnormal stress.    CHIEF COMPLIANT  Chief Complaint   Patient presents with    Abnormal stress       Active Problems:  Problem List Items Addressed This Visit          Cardiac and Vasculature    Essential hypertension    Relevant Orders    [facility]    CBC & Differential    Comprehensive Metabolic Panel    Ischemic cardiomyopathy    Relevant Orders    [facility]    CBC & Differential    Comprehensive Metabolic Panel     Other Visit Diagnoses       Coronary artery disease involving coronary bypass graft of native heart without angina pectoris    -  Primary    Relevant Orders    [facility]    CBC & Differential    Comprehensive Metabolic Panel    Positive cardiac stress test        Relevant Orders    [facility]    CBC & Differential    Comprehensive Metabolic Panel    Bee sting reaction, accidental or unintentional, initial encounter        Relevant Medications    methylPREDNISolone (MEDROL) 4 MG dose pack        Problem list:   1.  CAD  1.1 left heart cath 7/19/2021 - 50% narrowing in the proximal portion of the LAD, diffuse irregularities throughout the circumflex with 30 to 50% narrowing before the PDA, right coronary artery totally occluded just proximal to the acute margin, however FFR was 0.85.  EF 40 to 45%, LVEDP 12-14  1.2 left heart cath 12/9/2022-left main mild irregularities, LAD 60 to 70% stenosis, left circumflex 20-30 mid and distal, right coronary artery 99% stenosis, marginal branch 95 to 90% stenosis, FFR 0.78, hemodynamic significant LAD, subtotal occlusion of the mid RCA, consideration for CABG due to the fact patient is diabetic  1.3 CABG times 212/23/2022, Dr. Beltrán to the LAD, vein graft to the posterior descending branch  1.4 stress test 7/23: Moderately sized, moderately dense but nearly completely reversible defect involving the inferior basilar and lateral walls as well as  "portion of the anterior lateral wall compatible with ischemia, mildly depressed post-rest ejection fraction of 50% with septal inferior septal severe hypokinesis to akinesis  2.  Hypertension  3.  Hyperlipidemia  4.  Palpitations  4.1 event monitor 6/10-6/23/2021-PVCs, NSVT (5 beat)  5.  Shortness of breath  5.1 Echo 7/23: EF 60 to 65%, grade 1A diastolic dysfunction, trivial MR AI and physiological TR  6.   Renal artery ultrasound 6/21/2021-no hemodynamically significant renal artery stenosis  7.  DARON, CPAP      HPI  HPI  Stone Villeda is a 48-year-old male who presents today for a follow-up visit due to an abnormal stress test.    At the patient's last visit, he had EKG changes of the inferolateral that we discussed. The stress test indicated the  demonstrates moderately sized, moderately dense, but nearly completely reversible defect involving the inferior basilar and lateral walls as well as a portion of the anterolateral wall compatible with ischemia. He had a SVG, 2 PDA. His pump function was preserved, after stress, it was about 50 percent. His pump function was as low as 38 percent previously and now it is up to 60 to 65 percent.    The patient states he has been feeling better. He regained his functional compacity back. He has improved back to his baseline. He reports that everyone has told him he looks better. He was receiving multiple calls from the cardiac rehabilitation and since he was back to work full-time and feeling better, he decided not to go.    He maintains that he has had approximately 3 heart catheterizations in the past. He adds that Dr. Nicholson had performed a heart catheterization on him once. The patient states that he has always had \"thick walls\" in his heart for a long time.  He typically does not have symptoms with his coronary artery disease.    The patient denies experiencing chest pain, shortness of breath, or chest pressure. He is currently taking Entresto, metoprolol, " spironolactone, and amlodipine.    His blood pressure is elevated today. He states that he has been checking his blood pressure at home, but it is difficult to control. He notes that his blood pressure was 125/82 mmHg this morning. He was given clonidine as needed to decrease his blood pressure.    The patient was stung by a wasp yesterday, 08/09/2023 and swelled up. The swelling has not decreased since then.    The patient has a family history of cardiac issues. His younger brother begin experiencing myocardial infarctions at 38 years old, and had 9 stents placed. The patient reports he has never had a heart attack.    PRIOR MEDS  Current Outpatient Medications on File Prior to Visit   Medication Sig Dispense Refill    amLODIPine (NORVASC) 10 MG tablet Take 1 tablet by mouth Daily.      aspirin 325 MG tablet Take 1 tablet by mouth Daily. PT CURRENTLY TAKING THE 325MG      cloNIDine (Catapres) 0.1 MG tablet Take 1 tablet by mouth 3 (Three) Times a Day As Needed for High Blood Pressure (SBP > 160 or DBP > 90). 30 tablet 5    clopidogrel (PLAVIX) 75 MG tablet Take 1 tablet by mouth Daily. HOLD STARTING 12/18/2022 90 tablet 3    metFORMIN ER (GLUCOPHAGE-XR) 500 MG 24 hr tablet Take 1 tablet by mouth 2 (Two) Times a Day.      metoprolol tartrate (LOPRESSOR) 50 MG tablet Take 1 tablet by mouth Every 12 (Twelve) Hours. 90 tablet 2    nitroglycerin (NITROSTAT) 0.4 MG SL tablet 1 under the tongue as needed for angina, may repeat q5mins for up three doses (Patient taking differently: Place 1 tablet under the tongue Every 5 (Five) Minutes As Needed. 1 under the tongue as needed for angina, may repeat q5mins for up three doses  PT HAS NEVER TAKEN) 30 tablet 2    rosuvastatin (CRESTOR) 20 MG tablet Take 1 tablet by mouth Daily. 90 tablet 3    sacubitril-valsartan (ENTRESTO) 49-51 MG tablet Take 1 tablet by mouth 2 (Two) Times a Day. 60 tablet 11    spironolactone (ALDACTONE) 25 MG tablet Take 1 tablet by mouth Daily. 30 tablet  6     No current facility-administered medications on file prior to visit.       ALLERGIES  Patient has no known allergies.    HISTORY  Past Medical History:   Diagnosis Date    Coronary artery disease     COVID-19 02/15/2022    Diabetes mellitus     Hyperlipidemia     Hypertension     Sleep apnea     NO CPAP       Social History     Socioeconomic History    Marital status: Single    Number of children: 1   Tobacco Use    Smoking status: Former     Packs/day: 1.00     Years: 30.00     Pack years: 30.00     Types: Cigarettes     Quit date: 2022     Years since quittin.6    Smokeless tobacco: Never   Vaping Use    Vaping Use: Never used   Substance and Sexual Activity    Alcohol use: Never    Drug use: Not Currently    Sexual activity: Defer       Family History   Problem Relation Age of Onset    Breast cancer Mother     Hypertension Father     Heart attack Father     Heart attack Brother     No Known Problems Maternal Grandmother     No Known Problems Maternal Grandfather     No Known Problems Paternal Grandmother     Heart disease Paternal Grandfather        Review of Systems   Constitutional:  Negative for chills, fatigue and fever.   HENT:  Negative for congestion, rhinorrhea and sore throat.    Eyes:  Negative for visual disturbance.   Respiratory:  Negative for apnea, chest tightness and shortness of breath.    Cardiovascular:  Negative for chest pain, palpitations and leg swelling.   Gastrointestinal:  Negative for constipation, diarrhea and nausea.   Musculoskeletal:  Negative for arthralgias, back pain and neck pain.   Allergic/Immunologic: Positive for environmental allergies. Negative for food allergies.   Neurological:  Negative for dizziness, syncope, weakness and light-headedness.   Hematological:  Bruises/bleeds easily.   Psychiatric/Behavioral:  Negative for sleep disturbance.      Objective     VITALS: /86 (BP Location: Right arm, Patient Position: Sitting, Cuff Size: Adult)   Pulse  "79   Ht 193 cm (75.98\")   Wt 128 kg (281 lb 3.2 oz)   SpO2 98%   BMI 34.24 kg/mý     LABS:   Lab Results (most recent)       None            IMAGING:   No Images in the past 120 days found..    EXAM:  Physical Exam  Vitals and nursing note reviewed.   Constitutional:       Appearance: He is well-developed.   HENT:      Head: Normocephalic.   Neck:      Thyroid: No thyroid mass.      Vascular: No carotid bruit or JVD.      Trachea: Trachea and phonation normal.   Cardiovascular:      Rate and Rhythm: Normal rate and regular rhythm.      Pulses:           Radial pulses are 2+ on the right side and 2+ on the left side.        Posterior tibial pulses are 2+ on the right side and 2+ on the left side.      Heart sounds: Normal heart sounds. No murmur heard.    No friction rub. No gallop.   Pulmonary:      Effort: Pulmonary effort is normal. No respiratory distress.      Breath sounds: Normal breath sounds. No wheezing or rales.   Musculoskeletal:         General: No swelling. Normal range of motion.      Cervical back: Neck supple.   Skin:     General: Skin is warm and dry.      Capillary Refill: Capillary refill takes less than 2 seconds.      Findings: No rash.   Neurological:      Mental Status: He is alert and oriented to person, place, and time.   Psychiatric:         Speech: Speech normal.         Behavior: Behavior normal.         Thought Content: Thought content normal.         Judgment: Judgment normal.       Procedure   Procedures       Assessment & Plan    Diagnosis Plan   1. Coronary artery disease involving coronary bypass graft of native heart without angina pectoris  [facility]    CBC & Differential    Comprehensive Metabolic Panel      2. Ischemic cardiomyopathy  [facility]    CBC & Differential    Comprehensive Metabolic Panel      3. Essential hypertension  [facility]    CBC & Differential    Comprehensive Metabolic Panel      4. Positive cardiac stress test  [facility]    CBC & Differential    Comprehensive Metabolic Panel      5. Bee sting reaction, accidental or unintentional, initial encounter  methylPREDNISolone (MEDROL) 4 MG dose pack      1. The patient's recent cardiac test results were reviewed and discussed in full detail with the patient.  Patient did have a positive stress test.  He reports that he did not have any significant symptoms previously when he had blockages.  We did discuss further evaluations for ischemia including left heart cath.  Patient has opted to move forth with a left heart catheterization.  Benefits and risk of the heart catheterization was explained and patient wishes to continue.    We will also have patient have labs drawn proximately 1 week prior to left heart catheterization for further risk stratification  2. The patient's medical regimen was reviewed and discussed in full detail with the patient.  3. The patient's lab work was reviewed and discussed with the patient in full detail.  4. The patient was prescribed prednisone 20 mg take 1 tablet by mouth daily for 5 days for his bee sting reaction. He was advised to take a Benadryl at night.  5.  Patient's blood pressure is elevated at 156/86 already 79.  He reports that his blood pressures typically about 125/82.  He will continue the metoprolol amlodipine, spironolactone, and Entresto.  6.  Patient does have a history of heart failure with reduced ejection fraction/ischemic cardiomyopathy he is on metoprolol and Entresto as well as spironolactone.  He will continue to monitor his blood pressure on a routine basis and report any significant highs or lows.  7.  Informed of signs and symptoms of ACS and advised to seek emergent treatment for any new worsening symptoms.  Patient also advised sooner follow-up as needed.  Also advised to follow-up with family doctor as needed  This note is dictated utilizing voice recognition software.  Although this record has been proof read,  transcriptional errors may still be present. If questions occur regarding the content of this record please do not hesitate to call our office.  I have reviewed and confirmed the accuracy of the ROS as documented by the MA/LPN/RN ELEANOR Becker    Return if symptoms worsen or fail to improve, for cath follow up 1-2 weeks.    Diagnoses and all orders for this visit:    1. Coronary artery disease involving coronary bypass graft of native heart without angina pectoris (Primary)  -     [facility]; Future  -     CBC & Differential; Future  -     Comprehensive Metabolic Panel; Future    2. Ischemic cardiomyopathy  -     [facility]; Future  -     CBC & Differential; Future  -     Comprehensive Metabolic Panel; Future    3. Essential hypertension  -     [facility]; Future  -     CBC & Differential; Future  -     Comprehensive Metabolic Panel; Future    4. Positive cardiac stress test  -     [facility]; Future  -     CBC & Differential; Future  -     Comprehensive Metabolic Panel; Future    5. Bee sting reaction, accidental or unintentional, initial encounter  -     methylPREDNISolone (MEDROL) 4 MG dose pack; Take as directed on package instructions.  Dispense: 21 tablet; Refill: 0          Assessment  1. Coronary artery disease  2. Hypertension  3. Bee-sting reaction  4. Positive stress test    Stone Villeda  reports that he quit smoking about 7 months ago. His smoking use included cigarettes. He has a 30.00 pack-year smoking history. He has never used smokeless tobacco.. I have educated him on the risk of diseases from using tobacco products.        MEDS ORDERED DURING VISIT:  New Medications Ordered This Visit   Medications    methylPREDNISolone (MEDROL) 4 MG dose pack     Sig: Take as directed on package instructions.     Dispense:  21 tablet     Refill:  0           This document has been electronically signed by ELEANOR Becker Jr.  August 14, 2023 00:55  EDT        Transcribed from ambient dictation for ELEANOR Becker by Lexus Stephens.  08/10/23   17:33 EDT    Patient or patient representative verbalized consent to the visit recording.  I have personally performed the services described in this document as transcribed by the above individual, and it is both accurate and complete.

## 2023-08-15 DIAGNOSIS — I25.810 CORONARY ARTERY DISEASE INVOLVING CORONARY BYPASS GRAFT OF NATIVE HEART WITHOUT ANGINA PECTORIS: ICD-10-CM

## 2023-08-15 DIAGNOSIS — I10 ESSENTIAL HYPERTENSION: ICD-10-CM

## 2023-08-15 DIAGNOSIS — I25.5 ISCHEMIC CARDIOMYOPATHY: ICD-10-CM

## 2023-08-15 DIAGNOSIS — R94.39 POSITIVE CARDIAC STRESS TEST: ICD-10-CM

## 2023-08-17 NOTE — PROGRESS NOTES
Relatively normal labs.  Except for elevated white count of 13.  The patient is having infective symptoms please have her follow-up with her PCP.

## 2023-08-18 ENCOUNTER — TELEPHONE (OUTPATIENT)
Dept: CARDIOLOGY | Facility: CLINIC | Age: 48
End: 2023-08-18
Payer: COMMERCIAL

## 2023-08-18 NOTE — TELEPHONE ENCOUNTER
----- Message from ELEANOR Becker sent at 8/17/2023 12:11 AM EDT -----  Relatively normal labs.  Except for elevated white count of 13.  The patient is having infective symptoms please have her follow-up with her PCP.    I called and went over lab results with the patient . He said that he does not feel bad or have any symptoms of infection. He will see PCP if he should have any symptoms.    Nedra CLIFTON

## 2023-08-28 ENCOUNTER — TELEPHONE (OUTPATIENT)
Dept: CARDIOLOGY | Facility: CLINIC | Age: 48
End: 2023-08-28

## 2023-08-28 NOTE — TELEPHONE ENCOUNTER
Caller: [facility]    Relationship: Provider    Best call back number: [phone]     What is the best time to reach you: ANYTIME     What was the call regarding: CALLER IS NEEDING CPT CODE FOR CATH LAB INSURANCE. PLEASE ADVISE.

## 2023-09-13 ENCOUNTER — OUTSIDE FACILITY SERVICE (OUTPATIENT)
Dept: CARDIOLOGY | Facility: CLINIC | Age: 48
End: 2023-09-13
Payer: COMMERCIAL

## 2023-09-13 PROCEDURE — 93459 L HRT ART/GRFT ANGIO: CPT | Performed by: INTERNAL MEDICINE

## 2023-10-09 ENCOUNTER — OFFICE VISIT (OUTPATIENT)
Dept: CARDIOLOGY | Facility: CLINIC | Age: 48
End: 2023-10-09
Payer: COMMERCIAL

## 2023-10-09 VITALS
HEIGHT: 76 IN | DIASTOLIC BLOOD PRESSURE: 81 MMHG | OXYGEN SATURATION: 100 % | HEART RATE: 73 BPM | WEIGHT: 282.8 LBS | BODY MASS INDEX: 34.44 KG/M2 | SYSTOLIC BLOOD PRESSURE: 136 MMHG

## 2023-10-09 DIAGNOSIS — I25.10 CORONARY ARTERY DISEASE INVOLVING NATIVE CORONARY ARTERY OF NATIVE HEART WITHOUT ANGINA PECTORIS: ICD-10-CM

## 2023-10-09 DIAGNOSIS — I25.5 ISCHEMIC CARDIOMYOPATHY: ICD-10-CM

## 2023-10-09 DIAGNOSIS — I10 ESSENTIAL HYPERTENSION: Primary | ICD-10-CM

## 2023-10-09 PROCEDURE — 99214 OFFICE O/P EST MOD 30 MIN: CPT | Performed by: NURSE PRACTITIONER

## 2023-10-09 PROCEDURE — 3079F DIAST BP 80-89 MM HG: CPT | Performed by: NURSE PRACTITIONER

## 2023-10-09 PROCEDURE — 1159F MED LIST DOCD IN RCRD: CPT | Performed by: NURSE PRACTITIONER

## 2023-10-09 PROCEDURE — 3075F SYST BP GE 130 - 139MM HG: CPT | Performed by: NURSE PRACTITIONER

## 2023-10-09 PROCEDURE — 1160F RVW MEDS BY RX/DR IN RCRD: CPT | Performed by: NURSE PRACTITIONER

## 2023-10-09 RX ORDER — METOPROLOL SUCCINATE 50 MG/1
50 TABLET, EXTENDED RELEASE ORAL DAILY
Qty: 90 TABLET | Refills: 3 | Status: SHIPPED | OUTPATIENT
Start: 2023-10-09

## 2023-10-09 NOTE — PROGRESS NOTES
Subjective     Stone Villeda is a 48 y.o. male who presents to [facility] for cath follow up  (No stenting).    CHIEF COMPLIANT  Chief Complaint   Patient presents with    cath follow up      No stenting       Active Problems:  Problem List Items Addressed This Visit          Cardiac and Vasculature    Essential hypertension - Primary    Relevant Medications    metoprolol succinate XL (TOPROL-XL) 50 MG 24 hr tablet    Coronary artery disease involving native coronary artery of native heart without angina pectoris    Relevant Medications    metoprolol succinate XL (TOPROL-XL) 50 MG 24 hr tablet    Ischemic cardiomyopathy    Relevant Medications    metoprolol succinate XL (TOPROL-XL) 50 MG 24 hr tablet        Problem list:   1.  CAD  1.1 left heart cath 7/19/2021 - 50% narrowing in the proximal portion of the LAD, diffuse irregularities throughout the circumflex with 30 to 50% narrowing before the PDA, right coronary artery totally occluded just proximal to the acute margin, however FFR was 0.85.  EF 40 to 45%, LVEDP 12-14  1.2 left heart cath 12/9/2022-left main mild irregularities, LAD 60 to 70% stenosis, left circumflex 20-30 mid and distal, right coronary artery 99% stenosis, marginal branch 95 to 90% stenosis, FFR 0.78, hemodynamic significant LAD, subtotal occlusion of the mid RCA, consideration for CABG due to the fact patient is diabetic  1.3 CABG times 212/23/2022, Dr. Walker-AMAYA to the LAD, vein graft to the posterior descending branch  1.4 left heart cath 9/23:Left main diffusely irregular, 50 to 70% to the circumflex, LAD 50 to 70% proximal, ramus irregularities, RCA severely diffusely diseased course, vein graft to PDA widely patent, LIMA to LAD patent throughout its course with no distal disease, EF 45 to 50%, LVEDP 12-14  2.  Hypertension  3.  Hyperlipidemia  4.  Palpitations  4.1 event monitor 6/10-6/23/2021-PVCs, NSVT (5 beat)  5.  Shortness of breath  5.1 Echo 7/23: EF 60 to 65%, grade 1A diastolic  dysfunction, trivial MR AI and physiological TR  6.   Renal artery ultrasound 6/21/2021-no hemodynamically significant renal artery stenosis  7.  DARON, CPAP    HPI  HPI  Mr. Stone Frazier 48-year-old male patient who is being followed up today status post left heart catheterization.  He does have a history of significant coronary artery disease when he went under coronary artery bypass grafting 2022 with LIMA to LAD and vein graft to PDA.  He is to go under repeat left heart catheterization due to return of symptoms which have again resided.  The left heart catheterization showed stable coronary artery anatomy with a reduced ejection fraction of 45 to 50%.  He denies any complications in the right groin access site.  He is on guideline driven medication therapy including metoprolol and Entresto and Aldactone.  He is also on dual antiplatelet therapy of aspirin and Plavix.  He is also on high intensity statin therapy of rosuvastatin.    Due to his chronic arterial hypertension he is being treated with amlodipine and Entresto metoprolol and which his blood pressure is controlled today at 136/81 heart rate is 73.    He reports that his symptoms have resolved and that he is working 6-1/2 to 7 days a week without any limitation.  He denies any angina anginal equivalent symptoms.  We will continue to monitor at this time.    He denies chest pain, shortness of breath, lower extremity edema, palpitations, fatigue, dizziness, lightheadedness, syncope, PND, orthopnea, or strokelike symptoms.  PRIOR MEDS  Current Outpatient Medications on File Prior to Visit   Medication Sig Dispense Refill    amLODIPine (NORVASC) 10 MG tablet Take 1 tablet by mouth Daily.      aspirin 325 MG tablet Take 1 tablet by mouth Daily. PT CURRENTLY TAKING THE 325MG      cloNIDine (Catapres) 0.1 MG tablet Take 1 tablet by mouth 3 (Three) Times a Day As Needed for High Blood Pressure (SBP > 160 or DBP > 90). 30 tablet 5    clopidogrel (PLAVIX) 75 MG  tablet Take 1 tablet by mouth Daily. HOLD STARTING 2022 90 tablet 3    metFORMIN ER (GLUCOPHAGE-XR) 500 MG 24 hr tablet Take 1 tablet by mouth 2 (Two) Times a Day.      methylPREDNISolone (MEDROL) 4 MG dose pack Take as directed on package instructions. 21 tablet 0    nitroglycerin (NITROSTAT) 0.4 MG SL tablet 1 under the tongue as needed for angina, may repeat q5mins for up three doses (Patient taking differently: Place 1 tablet under the tongue Every 5 (Five) Minutes As Needed. 1 under the tongue as needed for angina, may repeat q5mins for up three doses  PT HAS NEVER TAKEN) 30 tablet 2    rosuvastatin (CRESTOR) 20 MG tablet Take 1 tablet by mouth Daily. 90 tablet 3    sacubitril-valsartan (ENTRESTO) 49-51 MG tablet Take 1 tablet by mouth 2 (Two) Times a Day. 60 tablet 11    spironolactone (ALDACTONE) 25 MG tablet Take 1 tablet by mouth Daily. 30 tablet 6    [DISCONTINUED] metoprolol tartrate (LOPRESSOR) 50 MG tablet Take 1 tablet by mouth Every 12 (Twelve) Hours. 90 tablet 2     No current facility-administered medications on file prior to visit.       ALLERGIES  Patient has no known allergies.    HISTORY  Past Medical History:   Diagnosis Date    Coronary artery disease     COVID-19 02/15/2022    Diabetes mellitus     Hyperlipidemia     Hypertension     Sleep apnea     NO CPAP       Social History     Socioeconomic History    Marital status: Single    Number of children: 1   Tobacco Use    Smoking status: Former     Packs/day: 1.00     Years: 30.00     Additional pack years: 0.00     Total pack years: 30.00     Types: Cigarettes     Quit date: 2022     Years since quittin.7    Smokeless tobacco: Never   Vaping Use    Vaping Use: Never used   Substance and Sexual Activity    Alcohol use: Never    Drug use: Not Currently    Sexual activity: Defer       Family History   Problem Relation Age of Onset    Breast cancer Mother     Hypertension Father     Heart attack Father     Heart attack Brother      "No Known Problems Maternal Grandmother     No Known Problems Maternal Grandfather     No Known Problems Paternal Grandmother     Heart disease Paternal Grandfather        Review of Systems   Constitutional: Negative.  Negative for chills, fatigue and fever.   HENT: Negative.  Negative for congestion, rhinorrhea and sore throat.    Eyes: Negative.  Negative for visual disturbance.   Respiratory: Negative.  Negative for apnea, chest tightness and shortness of breath.    Cardiovascular: Negative.  Negative for chest pain, palpitations and leg swelling.   Gastrointestinal: Negative.  Negative for constipation, diarrhea and nausea.   Musculoskeletal: Negative.  Negative for arthralgias, back pain and neck pain.   Allergic/Immunologic: Positive for environmental allergies. Negative for food allergies.   Neurological: Negative.  Negative for dizziness, syncope, weakness and light-headedness.   Hematological: Negative.  Does not bruise/bleed easily.   Psychiatric/Behavioral: Negative.  Negative for sleep disturbance.        Objective     VITALS: /81 (BP Location: Left arm, Patient Position: Sitting, Cuff Size: Adult)   Pulse 73   Ht 193 cm (75.98\")   Wt 128 kg (282 lb 12.8 oz)   SpO2 100%   BMI 34.44 kg/mý     LABS:   Lab Results (most recent)       None            IMAGING:   No Images in the past 120 days found..    EXAM:  Physical Exam  Vitals and nursing note reviewed.   Constitutional:       Appearance: He is well-developed.   HENT:      Head: Normocephalic.   Neck:      Thyroid: No thyroid mass.      Vascular: No carotid bruit or JVD.      Trachea: Trachea and phonation normal.   Cardiovascular:      Rate and Rhythm: Normal rate and regular rhythm.      Pulses:           Radial pulses are 2+ on the right side and 2+ on the left side.        Dorsalis pedis pulses are 2+ on the right side and 2+ on the left side.        Posterior tibial pulses are 2+ on the right side and 2+ on the left side.      Heart " sounds: Normal heart sounds. No murmur heard.     No friction rub. No gallop.   Pulmonary:      Effort: Pulmonary effort is normal. No respiratory distress.      Breath sounds: Normal breath sounds. No wheezing or rales.   Musculoskeletal:         General: No swelling. Normal range of motion.      Cervical back: Neck supple.   Skin:     General: Skin is warm and dry.      Capillary Refill: Capillary refill takes less than 2 seconds.      Findings: No rash.   Neurological:      Mental Status: He is alert and oriented to person, place, and time.   Psychiatric:         Speech: Speech normal.         Behavior: Behavior normal.         Thought Content: Thought content normal.         Judgment: Judgment normal.         Procedure   Procedures       Assessment & Plan    Diagnosis Plan   1. Essential hypertension        2. Ischemic cardiomyopathy        3. Coronary artery disease involving native coronary artery of native heart without angina pectoris        1.  Patient's blood pressure is controlled on current blood pressure medication regimen.  No medication changes are warranted at this time.  Patient advised to monitor blood pressure on a daily basis and report any persistent highs or lows.  Set goal blood pressure for patient at 130/80 or below.  2.  Patient does have substantial history of coronary artery disease with history of coronary artery bypass grafting recently went under left heart catheterization showed stable anatomy with patent grafts.  He will continue dual antiplatelet therapy and high intensity statin therapy.  3.  Patient denies any complications of the right femoral access site.  Pulses are palpable distal.  4.  Patient does have ischemic cardiomyopathy which we will start Jardiance 10 mg daily and switch his metoprolol to tartrate to metoprolol succinate.  This will be in addition to his spironolactone and Entresto.  5.  Informed of signs and symptoms of ACS and advised to seek emergent treatment for  any new worsening symptoms.  Patient also advised sooner follow-up as needed.  Also advised to follow-up with family doctor as needed  This note is dictated utilizing voice recognition software.  Although this record has been proof read, transcriptional errors may still be present. If questions occur regarding the content of this record please do not hesitate to call our office.  I have reviewed and confirmed the accuracy of the ROS as documented by the MA/LPN/RN ELEANOR Becker    No follow-ups on file.    Diagnoses and all orders for this visit:    1. Essential hypertension (Primary)    2. Ischemic cardiomyopathy    3. Coronary artery disease involving native coronary artery of native heart without angina pectoris    Other orders  -     metoprolol succinate XL (TOPROL-XL) 50 MG 24 hr tablet; Take 1 tablet by mouth Daily.  Dispense: 90 tablet; Refill: 3  -     empagliflozin (Jardiance) 10 MG tablet tablet; Take 1 tablet by mouth Daily.  Dispense: 30 tablet; Refill: 11        Stone Villeda  reports that he quit smoking about 9 months ago. His smoking use included cigarettes. He has a 30.00 pack-year smoking history. He has never used smokeless tobacco.. I have educated him on the risk of diseases from using tobacco products.       MEDS ORDERED DURING VISIT:  New Medications Ordered This Visit   Medications    metoprolol succinate XL (TOPROL-XL) 50 MG 24 hr tablet     Sig: Take 1 tablet by mouth Daily.     Dispense:  90 tablet     Refill:  3    empagliflozin (Jardiance) 10 MG tablet tablet     Sig: Take 1 tablet by mouth Daily.     Dispense:  30 tablet     Refill:  11           This document has been electronically signed by ELEANOR Becker Jr.  October 9, 2023 16:41 EDT

## 2024-01-29 ENCOUNTER — OFFICE VISIT (OUTPATIENT)
Dept: CARDIOLOGY | Facility: CLINIC | Age: 49
End: 2024-01-29
Payer: COMMERCIAL

## 2024-01-29 VITALS
BODY MASS INDEX: 34.07 KG/M2 | OXYGEN SATURATION: 96 % | DIASTOLIC BLOOD PRESSURE: 88 MMHG | SYSTOLIC BLOOD PRESSURE: 144 MMHG | WEIGHT: 279.8 LBS | HEIGHT: 76 IN | HEART RATE: 75 BPM

## 2024-01-29 DIAGNOSIS — I10 ESSENTIAL HYPERTENSION: ICD-10-CM

## 2024-01-29 DIAGNOSIS — E78.2 MIXED HYPERLIPIDEMIA: ICD-10-CM

## 2024-01-29 DIAGNOSIS — I25.5 ISCHEMIC CARDIOMYOPATHY: Primary | ICD-10-CM

## 2024-01-29 DIAGNOSIS — I25.10 CORONARY ARTERY DISEASE INVOLVING NATIVE CORONARY ARTERY OF NATIVE HEART WITHOUT ANGINA PECTORIS: ICD-10-CM

## 2024-01-29 PROCEDURE — 3079F DIAST BP 80-89 MM HG: CPT | Performed by: NURSE PRACTITIONER

## 2024-01-29 PROCEDURE — 3077F SYST BP >= 140 MM HG: CPT | Performed by: NURSE PRACTITIONER

## 2024-01-29 PROCEDURE — 99213 OFFICE O/P EST LOW 20 MIN: CPT | Performed by: NURSE PRACTITIONER

## 2024-01-29 PROCEDURE — 1160F RVW MEDS BY RX/DR IN RCRD: CPT | Performed by: NURSE PRACTITIONER

## 2024-01-29 PROCEDURE — 1159F MED LIST DOCD IN RCRD: CPT | Performed by: NURSE PRACTITIONER

## 2024-01-29 NOTE — PROGRESS NOTES
Subjective     Stone Villeda is a 48 y.o. male who presents to day for Follow-up, Hypertension, and Coronary Artery Disease.    CHIEF COMPLIANT  Chief Complaint   Patient presents with    Follow-up    Hypertension    Coronary Artery Disease       Active Problems:  Problem list:   1.  CAD  1.1 left heart cath 7/19/2021 - 50% narrowing in the proximal portion of the LAD, diffuse irregularities throughout the circumflex with 30 to 50% narrowing before the PDA, right coronary artery totally occluded just proximal to the acute margin, however FFR was 0.85.  EF 40 to 45%, LVEDP 12-14  1.2 left heart cath 12/9/2022-left main mild irregularities, LAD 60 to 70% stenosis, left circumflex 20-30 mid and distal, right coronary artery 99% stenosis, marginal branch 95 to 90% stenosis, FFR 0.78, hemodynamic significant LAD, subtotal occlusion of the mid RCA, consideration for CABG due to the fact patient is diabetic  1.3 CABG times 212/23/2022, Dr. Walker-AMAYA to the LAD, vein graft to the posterior descending branch  1.4 left heart cath 9/23:Left main diffusely irregular, 50 to 70% to the circumflex, LAD 50 to 70% proximal, ramus irregularities, RCA severely diffusely diseased course, vein graft to PDA widely patent, LIMA to LAD patent throughout its course with no distal disease, EF 45 to 50%, LVEDP 12-14  2.  Hypertension  3.  Hyperlipidemia  4.  Palpitations  4.1 event monitor 6/10-6/23/2021-PVCs, NSVT (5 beat)  5.  Shortness of breath  5.1 Echo 7/23: EF 60 to 65%, grade 1A diastolic dysfunction, trivial MR AI and physiological TR  6.   Renal artery ultrasound 6/21/2021-no hemodynamically significant renal artery stenosis  7.  DARON, CPAP    HPI  HPI  Mr. Stone Frazier is a 48-year-old male patient who has a history of hypertension and coronary artery disease.    Patient does have a history of chronic arterial hypertension and in which she is on Entresto, spironolactone, and metoprolol succinate.  Today his blood pressure is  elevated at 144/88 with a heart rate of 75.Patient reports that his blood pressure usually runs good at home about 130's/80's.    Patient also has a history of coronary artery disease and which he did go under coronary artery bypass grafting in 2022 with LIMA to LAD and SVG to the posterior descending.  He had a left heart catheterization after that that showed left main diffusely irregular, 50 to 70% in the circumflex, 50 to 70% proximal LAD, ramus had minimal irregularities, RCA severely diffusely diseased vein graft to PDA was widely patent LIMA to LAD patent throughout its course with no distal disease EF is 45 to 50% with an LVEDP of 12-14.  He is on aspirin and Plavix for dual antiplatelet therapy as well as rosuvastatin for high intensity statin therapy    Patient does have heart failure with reduced ejection fraction and which he is on guideline driven medication therapy including metoprolol succinate and Entresto Jardiance and spironolactone.      Patient reports that he is doing well from the cardiovascular standpoint.  He says he works 6 to 7 days a week without any limitation.  He denies any angina anginal equivalent symptoms.  Patient denies any chest pain, shortness of breath, palpitations, lower extremity edema, fatigue, syncope, PND, orthopnea, or strokelike symptoms.  PRIOR MEDS  Current Outpatient Medications on File Prior to Visit   Medication Sig Dispense Refill    amLODIPine (NORVASC) 10 MG tablet Take 1 tablet by mouth Daily.      aspirin 325 MG tablet Take 1 tablet by mouth Daily. PT CURRENTLY TAKING THE 325MG      cloNIDine (Catapres) 0.1 MG tablet Take 1 tablet by mouth 3 (Three) Times a Day As Needed for High Blood Pressure (SBP > 160 or DBP > 90). 30 tablet 5    clopidogrel (PLAVIX) 75 MG tablet Take 1 tablet by mouth Daily. HOLD STARTING 12/18/2022 90 tablet 3    empagliflozin (Jardiance) 10 MG tablet tablet Take 1 tablet by mouth Daily. 30 tablet 11    metFORMIN ER (GLUCOPHAGE-XR) 500 MG  24 hr tablet Take 1 tablet by mouth 2 (Two) Times a Day.      metoprolol succinate XL (TOPROL-XL) 50 MG 24 hr tablet Take 1 tablet by mouth Daily. 90 tablet 3    nitroglycerin (NITROSTAT) 0.4 MG SL tablet 1 under the tongue as needed for angina, may repeat q5mins for up three doses (Patient taking differently: Place 1 tablet under the tongue Every 5 (Five) Minutes As Needed. 1 under the tongue as needed for angina, may repeat q5mins for up three doses  PT HAS NEVER TAKEN) 30 tablet 2    rosuvastatin (CRESTOR) 20 MG tablet Take 1 tablet by mouth Daily. 90 tablet 3    sacubitril-valsartan (ENTRESTO) 49-51 MG tablet Take 1 tablet by mouth 2 (Two) Times a Day. 60 tablet 11    spironolactone (ALDACTONE) 25 MG tablet Take 1 tablet by mouth Daily. 30 tablet 6    methylPREDNISolone (MEDROL) 4 MG dose pack Take as directed on package instructions. 21 tablet 0     No current facility-administered medications on file prior to visit.       ALLERGIES  Patient has no known allergies.    HISTORY  Past Medical History:   Diagnosis Date    Coronary artery disease     COVID-19 02/15/2022    Diabetes mellitus     Hyperlipidemia     Hypertension     Sleep apnea     NO CPAP       Social History     Socioeconomic History    Marital status: Single    Number of children: 1   Tobacco Use    Smoking status: Former     Packs/day: 1.00     Years: 30.00     Additional pack years: 0.00     Total pack years: 30.00     Types: Cigarettes     Quit date: 2022     Years since quittin.1    Smokeless tobacco: Never   Vaping Use    Vaping Use: Never used   Substance and Sexual Activity    Alcohol use: Never    Drug use: Not Currently    Sexual activity: Defer       Family History   Problem Relation Age of Onset    Breast cancer Mother     Hypertension Father     Heart attack Father     Heart attack Brother     No Known Problems Maternal Grandmother     No Known Problems Maternal Grandfather     No Known Problems Paternal Grandmother     Heart  "disease Paternal Grandfather        Review of Systems   Constitutional:  Negative for chills, fatigue and fever.   HENT:  Negative for congestion, rhinorrhea and sore throat.    Eyes:  Negative for visual disturbance.   Respiratory:  Negative for apnea, chest tightness and shortness of breath.    Cardiovascular:  Negative for chest pain, palpitations and leg swelling.   Gastrointestinal:  Negative for constipation, diarrhea and nausea.   Musculoskeletal:  Negative for arthralgias, back pain and neck pain.   Allergic/Immunologic: Positive for food allergies (chocolate). Negative for environmental allergies.   Neurological:  Negative for dizziness, syncope, weakness and light-headedness.   Hematological:  Does not bruise/bleed easily.   Psychiatric/Behavioral:  Negative for sleep disturbance.        Objective     VITALS: /88 (BP Location: Left arm, Patient Position: Sitting, Cuff Size: Adult)   Pulse 75   Ht 193 cm (75.98\")   Wt 127 kg (279 lb 12.8 oz)   SpO2 96%   BMI 34.07 kg/m²     LABS:   Lab Results (most recent)       None            IMAGING:   No Images in the past 120 days found..    EXAM:  Physical Exam  Vitals and nursing note reviewed.   Constitutional:       Appearance: He is well-developed.   HENT:      Head: Normocephalic.   Neck:      Thyroid: No thyroid mass.      Vascular: No carotid bruit or JVD.      Trachea: Trachea and phonation normal.   Cardiovascular:      Rate and Rhythm: Normal rate and regular rhythm.      Pulses:           Radial pulses are 2+ on the right side and 2+ on the left side.        Posterior tibial pulses are 2+ on the right side and 2+ on the left side.      Heart sounds: Normal heart sounds. No murmur heard.     No friction rub. No gallop.   Pulmonary:      Effort: Pulmonary effort is normal. No respiratory distress.      Breath sounds: Normal breath sounds. No wheezing or rales.   Musculoskeletal:         General: No swelling. Normal range of motion.      Cervical " back: Neck supple.   Skin:     General: Skin is warm and dry.      Capillary Refill: Capillary refill takes less than 2 seconds.      Findings: No rash.   Neurological:      Mental Status: He is alert and oriented to person, place, and time.   Psychiatric:         Speech: Speech normal.         Behavior: Behavior normal.         Thought Content: Thought content normal.         Judgment: Judgment normal.         Procedure   Procedures       Assessment & Plan    Diagnosis Plan   1. Ischemic cardiomyopathy        2. Coronary artery disease involving native coronary artery of native heart without angina pectoris        3. Essential hypertension        4. Mixed hyperlipidemia        1.  Patient does have a history of ischemic cardiomyopathy in which she is on guideline driven medication therapy of Jardiance, Entresto, metoprolol succinate, and spironolactone.  Will continue this therapy without change.  2.  Patient's blood pressure is controlled on current blood pressure medication regimen despite elevated blood pressure today.  No medication changes are warranted at this time.  Patient advised to monitor blood pressure on a daily basis and report any persistent highs or lows.  Set goal blood pressure for patient at 130/80 or below.  3.  Patient seems to be stable from the cardiovascular standpoint.  He denies any angina anginal equivalent symptoms.  Will continue to follow.  4.  Patient does have mixed hyperlipidemia in which she is scheduled to have blood work with his PCP.  Did ask for a copy of this to be sent to us for further evaluation due to his coronary artery disease.  Informed that new goal would be 55 on the LDL.  5.  Informed of signs and symptoms of ACS and advised to seek emergent treatment for any new worsening symptoms.  Patient also advised sooner follow-up as needed.  Also advised to follow-up with family doctor as needed  This note is dictated utilizing voice recognition software.  Although this  record has been proof read, transcriptional errors may still be present. If questions occur regarding the content of this record please do not hesitate to call our office.  I have reviewed and confirmed the accuracy of the ROS as documented by the MA/LPN/RN ELEANOR Becker    No follow-ups on file.    Diagnoses and all orders for this visit:    1. Ischemic cardiomyopathy (Primary)    2. Coronary artery disease involving native coronary artery of native heart without angina pectoris    3. Essential hypertension    4. Mixed hyperlipidemia        Stone Villeda  reports that he quit smoking about 13 months ago. His smoking use included cigarettes. He has a 30.00 pack-year smoking history. He has never used smokeless tobacco.. I have educated him on the risk of diseases from using tobacco products.         MEDS ORDERED DURING VISIT:  No orders of the defined types were placed in this encounter.          This document has been electronically signed by ELEANOR Becker Jr.  January 29, 2024 08:32 EST

## 2024-07-29 ENCOUNTER — OFFICE VISIT (OUTPATIENT)
Dept: CARDIOLOGY | Facility: CLINIC | Age: 49
End: 2024-07-29
Payer: COMMERCIAL

## 2024-07-29 ENCOUNTER — TELEPHONE (OUTPATIENT)
Dept: CARDIOLOGY | Facility: CLINIC | Age: 49
End: 2024-07-29

## 2024-07-29 VITALS
BODY MASS INDEX: 34.56 KG/M2 | WEIGHT: 283.8 LBS | HEART RATE: 87 BPM | OXYGEN SATURATION: 97 % | HEIGHT: 76 IN | DIASTOLIC BLOOD PRESSURE: 83 MMHG | SYSTOLIC BLOOD PRESSURE: 131 MMHG

## 2024-07-29 DIAGNOSIS — I10 ESSENTIAL HYPERTENSION: Primary | ICD-10-CM

## 2024-07-29 DIAGNOSIS — E78.2 MIXED HYPERLIPIDEMIA: ICD-10-CM

## 2024-07-29 DIAGNOSIS — I25.5 ISCHEMIC CARDIOMYOPATHY: ICD-10-CM

## 2024-07-29 DIAGNOSIS — I25.119 CORONARY ARTERY DISEASE INVOLVING NATIVE CORONARY ARTERY OF NATIVE HEART WITH ANGINA PECTORIS: ICD-10-CM

## 2024-07-29 DIAGNOSIS — E78.00 ELEVATED LDL CHOLESTEROL LEVEL: Primary | ICD-10-CM

## 2024-07-29 PROCEDURE — 99213 OFFICE O/P EST LOW 20 MIN: CPT | Performed by: NURSE PRACTITIONER

## 2024-07-29 PROCEDURE — 3079F DIAST BP 80-89 MM HG: CPT | Performed by: NURSE PRACTITIONER

## 2024-07-29 PROCEDURE — 1160F RVW MEDS BY RX/DR IN RCRD: CPT | Performed by: NURSE PRACTITIONER

## 2024-07-29 PROCEDURE — 1159F MED LIST DOCD IN RCRD: CPT | Performed by: NURSE PRACTITIONER

## 2024-07-29 PROCEDURE — 3075F SYST BP GE 130 - 139MM HG: CPT | Performed by: NURSE PRACTITIONER

## 2024-07-29 PROCEDURE — 93000 ELECTROCARDIOGRAM COMPLETE: CPT | Performed by: NURSE PRACTITIONER

## 2024-07-29 RX ORDER — NITROGLYCERIN 0.4 MG/1
TABLET SUBLINGUAL
Qty: 30 TABLET | Refills: 2 | Status: SHIPPED | OUTPATIENT
Start: 2024-07-29

## 2024-07-29 NOTE — TELEPHONE ENCOUNTER
Name: Stone Villeda YONI      Relationship: Self      Best Callback Number: [phone]      HUB PROVIDED THE RELAY MESSAGE FROM THE OFFICE      PATIENT: HAS FURTHER QUESTIONS AND WOULD LIKE A CALL BACK AT THE FOLLOWING PHONE [phone]    ADDITIONAL INFORMATION:

## 2024-07-29 NOTE — TELEPHONE ENCOUNTER
RELAY     Called patient to notify of JR recommendations after he looked over Lipid panel. Patient voicemail was full and could not accept messages.     Lab recommendations:  I am sorry is still in his chart and realized we did not discuss his labs. It looks like he has a significantly elevated LDL despite the rosuvastatin. See if he is interested in being referred to the lipid clinic or if he would just like to increase his rosuvastatin. I do not know if this will get us within the a point in which we need. Where he has significant coronary artery disease his goal LDL would be less than 70. I do think he will need a PSK 9 inhibitor so that would be my highest level recommendation. Would need to repeat a lipid panel either with his PCP or us in the next 3 months after medication change.

## 2024-07-29 NOTE — PROGRESS NOTES
Subjective     Stone Villeda is a 49 y.o. male who presents to day for 6 month follow up , Hypertension, Cardiomyopathy, and Coronary Artery Disease.    CHIEF COMPLIANT  Chief Complaint   Patient presents with    6 month follow up     Hypertension    Cardiomyopathy    Coronary Artery Disease       Active Problems:  Problem List Items Addressed This Visit          Cardiac and Vasculature    Essential hypertension - Primary    Relevant Orders    ECG 12 Lead    Hyperlipidemia    Relevant Orders    ECG 12 Lead    Ischemic cardiomyopathy    Relevant Medications    nitroglycerin (NITROSTAT) 0.4 MG SL tablet    Other Relevant Orders    ECG 12 Lead    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 pectoris    Relevant Medications    nitroglycerin (NITROSTAT) 0.4 MG SL tablet    Other Relevant Orders    ECG 12 Lead   Problem list:   1.  CAD  1.1 left heart cath 7/19/2021 - 50% narrowing in the proximal portion of the LAD, diffuse irregularities throughout the circumflex with 30 to 50% narrowing before the PDA, right coronary artery totally occluded just proximal to the acute margin, however FFR was 0.85.  EF 40 to 45%, LVEDP 12-14  1.2 left heart cath 12/9/2022-left main mild irregularities, LAD 60 to 70% stenosis, left circumflex 20-30 mid and distal, right coronary artery 99% stenosis, marginal branch 95 to 90% stenosis, FFR 0.78, hemodynamic significant LAD, subtotal occlusion of the mid RCA, consideration for CABG due to the fact patient is diabetic  1.3 CABG times 212/23/2022, Dr. Walker-AMAYA to the LAD, vein graft to the posterior descending branch  1.4 left heart cath 9/23:Left main diffusely irregular, 50 to 70% to the circumflex, LAD 50 to 70% proximal, ramus irregularities, RCA severely diffusely diseased course, vein graft to PDA widely patent, LIMA to LAD patent throughout its course with no distal disease, EF 45 to 50%, LVEDP 12-14  2.  Hypertension  3.  Hyperlipidemia  4.   Palpitations  4.1 event monitor 6/10-6/23/2021-PVCs, NSVT (5 beat)  5.  Shortness of breath  5.1 Echo 7/23: EF 60 to 65%, grade 1A diastolic dysfunction, trivial MR AI and physiological TR  6.   Renal artery ultrasound 6/21/2021-no hemodynamically significant renal artery stenosis  7.  DARON, CPAP    HPI  HPI  Mr. Stone Frazier is a 49-year-old male patient who is being followed up today for history of coronary artery disease and chronic arterial hypertension.    Patient also has a history of coronary artery disease and which he did go under coronary artery bypass grafting in 2022 with LIMA to LAD and SVG to the posterior descending.  He had a left heart catheterization after that that showed left main diffusely irregular, 50 to 70% in the circumflex, 50 to 70% proximal LAD, ramus had minimal irregularities, RCA severely diffusely diseased vein graft to PDA was widely patent LIMA to LAD patent throughout its course with no distal disease EF is 45 to 50% with an LVEDP of 12-14.  He is on aspirin and Plavix for dual antiplatelet therapy as well as rosuvastatin for high intensity statin therapy     Patient does have heart failure with reduced ejection fraction and which he is on guideline driven medication therapy including metoprolol succinate, Entresto, Jardiance, and spironolactone.  We will continue these medications without change.    Patient does have chronic arterial hypertension and which she is being treated with Entresto, spironolactone, and metoprolol.  Today's blood pressure is 131/83 heart rate of 81.  He denies any associated symptoms with his chronic arterial hypertension.    Patient does report some shortness of breath that only occurs with exertion when he gets hot from working outside.  He denies any dyspnea at rest or any orthopnea.  Patient reports what sounds to be like a superior functional capacity.    Patient did have labs done by his PCP and April.  CBC was relatively unremarkable, CMP  unremarkable with exception of elevated glucose of 122.  Lipid panel identified triglycerides 308, HDL 38, and LDL of 185.  Normal TSH B12 and folate.  Vitamin D was significantly low at 11.3.    PRIOR MEDS  Current Outpatient Medications on File Prior to Visit   Medication Sig Dispense Refill    amLODIPine (NORVASC) 10 MG tablet Take 1 tablet by mouth Daily.      aspirin 325 MG tablet Take 1 tablet by mouth Daily. PT CURRENTLY TAKING THE 325MG      cloNIDine (Catapres) 0.1 MG tablet Take 1 tablet by mouth 3 (Three) Times a Day As Needed for High Blood Pressure (SBP > 160 or DBP > 90). 30 tablet 5    clopidogrel (PLAVIX) 75 MG tablet Take 1 tablet by mouth Daily. HOLD STARTING 12/18/2022 90 tablet 3    Cyanocobalamin (VITAMIN B 12 PO) Take  by mouth.      empagliflozin (Jardiance) 10 MG tablet tablet Take 1 tablet by mouth Daily. 30 tablet 11    metFORMIN ER (GLUCOPHAGE-XR) 500 MG 24 hr tablet Take 1 tablet by mouth 2 (Two) Times a Day.      metoprolol succinate XL (TOPROL-XL) 50 MG 24 hr tablet Take 1 tablet by mouth Daily. 90 tablet 3    rosuvastatin (CRESTOR) 20 MG tablet Take 1 tablet by mouth Daily. 90 tablet 3    sacubitril-valsartan (ENTRESTO) 49-51 MG tablet Take 1 tablet by mouth 2 (Two) Times a Day. 60 tablet 11    spironolactone (ALDACTONE) 25 MG tablet Take 1 tablet by mouth Daily. 30 tablet 6    VITAMIN D PO Take  by mouth.      [DISCONTINUED] nitroglycerin (NITROSTAT) 0.4 MG SL tablet 1 under the tongue as needed for angina, may repeat q5mins for up three doses (Patient taking differently: Place 1 tablet under the tongue Every 5 (Five) Minutes As Needed. 1 under the tongue as needed for angina, may repeat q5mins for up three doses  PT HAS NEVER TAKEN) 30 tablet 2     No current facility-administered medications on file prior to visit.       ALLERGIES  Patient has no known allergies.    HISTORY  Past Medical History:   Diagnosis Date    Coronary artery disease     COVID-19 02/15/2022    Diabetes  mellitus     Hyperlipidemia     Hypertension     Sleep apnea     NO CPAP    Vitamin B 12 deficiency     Vitamin D deficiency        Social History     Socioeconomic History    Marital status: Single    Number of children: 1   Tobacco Use    Smoking status: Former     Current packs/day: 0.00     Average packs/day: 1 pack/day for 30.0 years (30.0 ttl pk-yrs)     Types: Cigarettes     Start date: 1992     Quit date: 2022     Years since quittin.6    Smokeless tobacco: Never   Vaping Use    Vaping status: Never Used   Substance and Sexual Activity    Alcohol use: Never    Drug use: Not Currently    Sexual activity: Defer       Family History   Problem Relation Age of Onset    Breast cancer Mother     Hypertension Father     Heart attack Father     Heart attack Brother     No Known Problems Maternal Grandmother     No Known Problems Maternal Grandfather     No Known Problems Paternal Grandmother     Heart disease Paternal Grandfather        Review of Systems   Constitutional: Negative.  Negative for chills, fatigue and fever.   HENT: Negative.  Negative for congestion, rhinorrhea and sore throat.    Eyes: Negative.  Negative for visual disturbance.   Respiratory:  Positive for shortness of breath (only with exertion in the heat). Negative for apnea and chest tightness.    Cardiovascular: Negative.  Negative for chest pain, palpitations and leg swelling.   Gastrointestinal: Negative.  Negative for constipation, diarrhea and nausea.   Musculoskeletal: Negative.  Negative for arthralgias, back pain and neck pain.   Allergic/Immunologic: Positive for environmental allergies. Negative for food allergies (Chocolate).   Neurological: Negative.  Negative for dizziness, syncope, weakness and light-headedness.   Hematological: Negative.  Does not bruise/bleed easily.   Psychiatric/Behavioral:  Negative for sleep disturbance.        Objective     VITALS: /83 (BP Location: Left arm, Patient Position: Sitting,  "Cuff Size: Adult)   Pulse 87   Ht 193 cm (75.98\")   Wt 129 kg (283 lb 12.8 oz)   SpO2 97%   BMI 34.56 kg/m²     LABS:   Lab Results (most recent)       None            IMAGING:   No Images in the past 120 days found..    EXAM:  Physical Exam  Vitals and nursing note reviewed.   Constitutional:       Appearance: He is well-developed.   HENT:      Head: Normocephalic.   Neck:      Thyroid: No thyroid mass.      Vascular: No carotid bruit or JVD.      Trachea: Trachea and phonation normal.   Cardiovascular:      Rate and Rhythm: Normal rate and regular rhythm.      Pulses:           Radial pulses are 2+ on the right side and 2+ on the left side.        Posterior tibial pulses are 2+ on the right side and 2+ on the left side.      Heart sounds: Normal heart sounds. No murmur heard.     No friction rub. No gallop.   Pulmonary:      Effort: Pulmonary effort is normal. No respiratory distress.      Breath sounds: Normal breath sounds. No wheezing or rales.   Musculoskeletal:         General: No swelling. Normal range of motion.      Cervical back: Neck supple.   Skin:     General: Skin is warm and dry.      Capillary Refill: Capillary refill takes less than 2 seconds.      Findings: No rash.   Neurological:      Mental Status: He is alert and oriented to person, place, and time.   Psychiatric:         Speech: Speech normal.         Behavior: Behavior normal.         Thought Content: Thought content normal.         Judgment: Judgment normal.         Procedure     ECG 12 Lead    Date/Time: 7/29/2024 9:08 AM  Performed by: Landry Walker APRN    Authorized by: Landry Walker APRN  Comparison: compared with previous ECG from 12/26/2022  Similar to previous ECG  Comparison to previous ECG: T wave changes in lead III, aVF, V4 5 and 6  Rhythm: sinus rhythm  Rate: normal  BPM: 89  QRS axis: right  Other findings: T wave abnormality  Comments: QTc 425 ms             Assessment & Plan    Diagnosis Plan   1. Essential " hypertension  ECG 12 Lead      2. 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 pectoris  ECG 12 Lead    nitroglycerin (NITROSTAT) 0.4 MG SL tablet      3. Ischemic cardiomyopathy  ECG 12 Lead      4. Mixed hyperlipidemia  ECG 12 Lead      1.  Patient did have some changes on his EKG especially in the inferior lateral walls.  We did discuss this in detail.  Due to the lack of symptomology a joint decision was made to defer testing.  Patient strongly advised if any symptoms do occur to notify us and we would move forth with repeat ischemic workup at that time.  2.  Patient does have ischemic cardiomyopathy which seems to have recovered at least to 45 to 50% per most recent left heart catheterization.  He is on guideline driven medication therapy.  He will continue this without change.  3.  Patient's blood pressure is controlled on current blood pressure medication regimen.  No medication changes are warranted at this time.  Patient advised to monitor blood pressure on a daily basis and report any persistent highs or lows.  Set goal blood pressure for patient at 130/80 or below.  4.  Informed of signs and symptoms of ACS and advised to seek emergent treatment for any new worsening symptoms.  Patient also advised sooner follow-up as needed.  Also advised to follow-up with family doctor as needed  This note is dictated utilizing voice recognition software.  Although this record has been proof read, transcriptional errors may still be present. If questions occur regarding the content of this record please do not hesitate to call our office.  I have reviewed and confirmed the accuracy of the ROS as documented by the MA/LPN/RN ELEANOR Becker    No follow-ups on file.    Diagnoses and all orders for this visit:    1. Essential hypertension (Primary)  -     ECG 12 Lead    2. 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 pectoris  -     ECG 12  Lead  -     nitroglycerin (NITROSTAT) 0.4 MG SL tablet; 1 under the tongue as needed for angina, may repeat q5mins for up three doses  Dispense: 30 tablet; Refill: 2    3. Ischemic cardiomyopathy  -     ECG 12 Lead    4. Mixed hyperlipidemia  -     ECG 12 Lead        Stone Villeda  reports that he quit smoking about 19 months ago. His smoking use included cigarettes. He started smoking about 31 years ago. He has a 30 pack-year smoking history. He has never used smokeless tobacco. I have educated him on the risk of diseases from using tobacco products. Patient does not smoke. Does use Nicotine pouches.      BMI is >= 30 and <35. (Class 1 Obesity). The following options were offered after discussion;: weight loss educational material (shared in after visit summary) and exercise counseling/recommendations           MEDS ORDERED DURING VISIT:  New Medications Ordered This Visit   Medications    nitroglycerin (NITROSTAT) 0.4 MG SL tablet     Si under the tongue as needed for angina, may repeat q5mins for up three doses     Dispense:  30 tablet     Refill:  2           This document has been electronically signed by Landry Walker Jr., APRN  2024 09:36 EDT

## 2024-07-30 NOTE — TELEPHONE ENCOUNTER
Returned a call to patient explained the Lipid panel results as well as elevated LDL. JR would like for his LDL to be less than 70. I did advise that we can refer him to Lipid clinic or he can try increasing rosuvastatin .     Patient would like referral to Lipid clinic.

## 2024-09-13 ENCOUNTER — HOSPITAL ENCOUNTER (OUTPATIENT)
Dept: CARDIOLOGY | Facility: HOSPITAL | Age: 49
Discharge: HOME | End: 2024-09-13
Payer: COMMERCIAL

## 2024-09-13 ENCOUNTER — SPECIALTY PHARMACY (OUTPATIENT)
Dept: PHARMACY | Facility: HOSPITAL | Age: 49
End: 2024-09-13
Payer: COMMERCIAL

## 2024-09-13 DIAGNOSIS — E78.5 HYPERLIPIDEMIA, UNSPECIFIED HYPERLIPIDEMIA TYPE: Primary | ICD-10-CM

## 2024-09-13 NOTE — PROGRESS NOTES
[facility]ty Pharmacy Patient Management Program  Lipid Management Program - PCSK9i Initial Assessment   Stone Villeda is a 49 y.o. male referred by their provider, Landry Walker, to the Hyperlipidemia Patient Management program offered by [facility] for Lipid Management.      An initial outreach was conducted, including assessment of therapy appropriateness and specialty medication education for Repatha. The patient was introduced to services offered by [facility], including: regular assessments, refill coordination, curbside pick-up or mail order delivery options, prior authorization maintenance, and financial assistance programs as applicable. The patient was also provided with contact information for the pharmacy team.     Stone Villeda is  treated for ASCVD and currently takes crestor for cholesterol but LDL remains above goal.  The patient denies any allergies to latex.      Insurance Coverage & Financial Support  [place] Medicaid    Relevant Past Medical History and Comorbidities  Relevant medical history and concomitant health conditions were discussed with the patient. The patient's chart has been reviewed for relevant past medical history and comorbid conditions and updated as necessary.  Past Medical History:   Diagnosis Date    Coronary artery disease     COVID-19 02/15/2022    Diabetes mellitus     Hyperlipidemia     Hypertension     Sleep apnea     NO CPAP    Vitamin B 12 deficiency     Vitamin D deficiency      Social History     Socioeconomic History    Marital status: Single    Number of children: 1   Tobacco Use    Smoking status: Former     Current packs/day: 0.00     Average packs/day: 1 pack/day for 30.0 years (30.0 ttl pk-yrs)     Types: Cigarettes     Start date: 1992     Quit date: 2022     Years since quittin.7    Smokeless tobacco: Never   Vaping Use    Vaping status: Never  Used   Substance and Sexual Activity    Alcohol use: Never    Drug use: Not Currently    Sexual activity: Defer       Problem list reviewed by Sharon Infante PharmD on 9/13/2024 at 12:06 PM    Allergies  Known allergies and reactions were discussed with the patient. The patient's chart has been reviewed for  allergy information and updated as necessary.   No Known Allergies         Relevant Laboratory Values  Relevant laboratory values were discussed with the patient. The following specialty medication dose adjustment(s) are recommended: See plan, if applicable   Lab Results   Component Value Date    CHOL 237 (H) 03/31/2022    TRIG 241 (H) 03/31/2022    HDL 33 (L) 03/31/2022     (H) 03/31/2022       Current Medication List  This medication list has been reviewed with the patient and evaluated for any interactions or necessary modifications/recommendations, and updated to include all prescription medications, OTC medications, and supplements the patient is currently taking.  This list reflects what is contained in the patient's profile, which has also been marked as reviewed to communicate to other providers it is the most up to date version of the patient's current medication therapy.     Current Outpatient Medications:     amLODIPine (NORVASC) 10 MG tablet, Take 1 tablet by mouth Daily., Disp: , Rfl:     aspirin 325 MG tablet, Take 1 tablet by mouth Daily. PT CURRENTLY TAKING THE 325MG, Disp: , Rfl:     cloNIDine (Catapres) 0.1 MG tablet, Take 1 tablet by mouth 3 (Three) Times a Day As Needed for High Blood Pressure (SBP > 160 or DBP > 90)., Disp: 30 tablet, Rfl: 5    clopidogrel (PLAVIX) 75 MG tablet, Take 1 tablet by mouth Daily. HOLD STARTING 12/18/2022, Disp: 90 tablet, Rfl: 3    Cyanocobalamin (VITAMIN B 12 PO), Take  by mouth., Disp: , Rfl:     empagliflozin (Jardiance) 10 MG tablet tablet, Take 1 tablet by mouth Daily., Disp: 30 tablet, Rfl: 11    Evolocumab (REPATHA) solution auto-injector  SureClick injection, Inject 1 mL under the skin into the appropriate area as directed Every 14 (Fourteen) Days., Disp: 2 mL, Rfl: 5    metFORMIN ER (GLUCOPHAGE-XR) 500 MG 24 hr tablet, Take 1 tablet by mouth 2 (Two) Times a Day., Disp: , Rfl:     metoprolol succinate XL (TOPROL-XL) 50 MG 24 hr tablet, Take 1 tablet by mouth Daily., Disp: 90 tablet, Rfl: 3    nitroglycerin (NITROSTAT) 0.4 MG SL tablet, 1 under the tongue as needed for angina, may repeat q5mins for up three doses, Disp: 30 tablet, Rfl: 2    rosuvastatin (CRESTOR) 20 MG tablet, Take 1 tablet by mouth Daily., Disp: 90 tablet, Rfl: 3    sacubitril-valsartan (ENTRESTO) 49-51 MG tablet, Take 1 tablet by mouth 2 (Two) Times a Day., Disp: 60 tablet, Rfl: 11    spironolactone (ALDACTONE) 25 MG tablet, Take 1 tablet by mouth Daily., Disp: 30 tablet, Rfl: 6    VITAMIN D PO, Take  by mouth., Disp: , Rfl:          Drug Interactions  None with Repatha      Goals of Therapy  Goals related to the patient's specialty therapy were discussed with the patient. The Patient Goals segment of this outreach has been reviewed and updated.   Goals Addressed Today    None         Medication Assessment & Plan  Medication Therapy Changes: Patient started today on Repatha 140mg every 2 weeks.   Injection training and medication education provided.   Pt administered in right side of belly.   Welcome information and patient satisfaction survey to be sent by specialty pharmacy team with patient's initial fill.  Related Plans, Therapy Recommendations, or Therapy Problems to Be Addressed: None  Patient will need lipid panel in 6 weeks, order placed.   Patient will continue regular follow-up with cardiology.   Patient will follow up with specialty pharmacy. Care Coordinator to set up future refill outreaches, coordinate prescription delivery, and escalate clinical questions to pharmacist.  Pharmacist to perform regular assessments no more than (6) months from the previous assessment.  Will follow-up in 6 months, or sooner if needed.    Initial Education Provided for Specialty Medication  The patient has been provided with the following education and any applicable administration techniques (i.e. self-injection) have been demonstrated for the therapies indicated. All questions and concerns have been addressed prior to the patient receiving the medication, and the patient has verbalized comprehension of the education and any materials provided. Additional patient education shall be provided and documented upon request by the patient, provider, or payer.      Initial Education Provided for Repatha    Patient seen in the Medication Management Clinic for initial education and injection training for PCSK9 inhibitors. The patient was introduced to services offered by [facility], including: regular assessments, refill coordination, curbside pick-up or mail order delivery options, prior authorization maintenance, and financial assistance programs as applicable. The patient was also provided with contact information for the pharmacy team. Welcome information and patient satisfaction survey to be sent by retail team with patient's initial fill.    Patient Instructions    Repatha is used to lower LDL, or bad cholesterol, to help reduce your chance of a heart attack or stroke.  You should give your injection once every 14 days days.  Your doctor will likely keep you on this medication indefinitely as long as it is working for you and not causing any adverse effects.      This medication should be kept in the refrigerator until you are ready to use it.  Once removed from the refrigerator, it is good at room temperature for 30 days.  Do not leave in your car or expose to extreme heat.  Do not shake or freeze.  Dispose the used syringe/device in a sharps container.     This injection is a subcutaneous injection, which means just under the skin.  It is important to choose an area of your  skin that is not tender, bruised, cut or has scars or stretch marks.  You can inject into your abdomen (except for 2 inches around your navel), your thigh or your upper arm.  Do not administer with other drugs.   Rotate injection sites each time you give the injection. Wash your hands prior to giving yourself an injection and use an alcohol wipe to clean the area of the injection and allow to dry prior to injecting.      If you miss a dose, take it as soon as you remember and resume the original schedule if it is within 7 days from the missed dose.  If an every 2 week dose is not administered within 7 days, wait until the next dose on the original schedule.  If a once-monthly dose is not administered within 7 days, administer the dose and start a new schedule based on this date.     Be sure to let your doctor or pharmacist know of any medication changes, including OTC and herbal supplements.     Adverse Effects  Reviewed with patient and education on management provided.     Sore Throat  Injection site pain  Itching or irritation   Flu-like symptoms  Signs of an allergic reaction     Immunizations  While there are no immunizations that you need to get specifically because you are on this drug, it is important to keep up with your recommended routine vaccinations.     Adherence and Self-Administration    Barriers to Patient Adherence and/or Self-Administration: None  Methods for Supporting Patient Adherence and/or Self-Administration: None Required     Goals of Therapy  Patient Goals of Therapy: To not miss any doses  Clinical Goals or Therapeutic Targets, If Applicable: LDL Reduction      Attestation  I attest that the initiated specialty medication(s) are appropriate for the patient based on my assessment.  If the prescribed therapy is at any point deemed not appropriate based on the current or future assessments, a consultation will be initiated with the patient's specialty care provider to determine the best  course of action. The revised plan of therapy will be documented along with any additional patient education provided.         The patient has been provided with the following education and any applicable administration techniques (i.e. self-injection) have been demonstrated for the therapies indicated. All questions and concerns have been addressed prior to the patient receiving the medication, and the patient has verbalized understanding of the education and any materials provided.  Additional patient education shall be provided and documented upon request by the patient, provider or payer.        Attestation      I attest the patient was actively involved in and has agreed to the above plan of care. If the prescribed therapy is at any point deemed not appropriate based on the current or future assessments, a consultation will be initiated with the patient's specialty care provider to determine the best course of action. The revised plan of therapy will be documented along with any required assessments and/or additional patient education provided.     Sharon Infante, PharmD  9/13/2024  12:06 EDT

## 2024-10-08 ENCOUNTER — SPECIALTY PHARMACY (OUTPATIENT)
Dept: PHARMACY | Facility: HOSPITAL | Age: 49
End: 2024-10-08
Payer: COMMERCIAL

## 2024-10-08 NOTE — PROGRESS NOTES
Specialty Pharmacy Refill Coordination Note     Stone is a 49 y.o. male contacted today regarding refills of  Repatha SureClick specialty medication(s).    Reviewed and verified with patient:       Specialty medication(s) and dose(s) confirmed: yes    Refill Questions      Flowsheet Row Most Recent Value   Changes to allergies? No   Changes to medications? No   New conditions or infections since last clinic visit No   Unplanned office visit, urgent care, ED, or hospital admission in the last 4 weeks  No   How does patient/caregiver feel medication is working? Very good   Financial problems or insurance changes  No   Since the previous refill, were any specialty medication doses or scheduled injections missed or delayed?  No   Does this patient require a clinical escalation to a pharmacist? No            Delivery Questions      Flowsheet Row Most Recent Value   Delivery method [employer]   Delivery address verified with patient/caregiver? Yes   Delivery address Home   Number of medications in delivery 1   Medication(s) being filled and delivered Evolocumab   Copay verified? Yes   Copay amount 0   Copay form of payment No copayment ($0)   Ship Date ship 10/09 deliver 10/10   Delivery Date ship 10/09 deliver 10/10   Signature Required No                   Follow-up: 84 day(s)     Mariah Eddy, Pharmacy Technician  Specialty Pharmacy Technician

## 2024-12-03 ENCOUNTER — TELEPHONE (OUTPATIENT)
Dept: CARDIOLOGY | Facility: CLINIC | Age: 49
End: 2024-12-03
Payer: COMMERCIAL

## 2024-12-03 NOTE — TELEPHONE ENCOUNTER
I called patient and went over lipid panel and recommendations as follows:    Triglycerides elevated 156, HDL 40, LDL 34.  Continue current medications without change.  Please advise patient decrease foods that are high in carbohydrates such as breads sweets pastas rice potatoes.

## 2024-12-19 ENCOUNTER — SPECIALTY PHARMACY (OUTPATIENT)
Dept: PHARMACY | Facility: HOSPITAL | Age: 49
End: 2024-12-19
Payer: COMMERCIAL

## 2024-12-19 RX ORDER — CHOLECALCIFEROL (VITAMIN D3) 1250 MCG
1 CAPSULE ORAL WEEKLY
COMMUNITY
Start: 2024-11-16

## 2024-12-19 RX ORDER — FOLIC ACID 1 MG/1
1 TABLET ORAL DAILY
COMMUNITY
Start: 2024-11-25

## 2024-12-19 NOTE — PROGRESS NOTES
[facility] Pharmacy Patient Management Program  Lipid Management Program - PCSK9i Initial Assessment     Stone Villeda is a 49 y.o. male referred by their provider, Landry Walker, to the Hyperlipidemia Patient Management program offered by [facility] for Lipid Management.  An initial outreach was conducted, including assessment of therapy appropriateness and specialty medication education for Repatha. The patient was introduced to services offered by [facility], including: regular assessments, refill coordination, curbside pick-up or mail order delivery options, prior authorization maintenance, and financial assistance programs as applicable. The patient was also provided with contact information for the pharmacy team.     Stone Villeda is  treated for ASCVD and currently takes crestor and repatha for cholesterol. The patient denies any allergies to latex.      Insurance Coverage & Financial Support  [place] medicaid     Relevant Past Medical History and Comorbidities  Relevant medical history and concomitant health conditions were discussed with the patient. The patient's chart has been reviewed for relevant past medical history and comorbid conditions and updated as necessary.  Past Medical History:   Diagnosis Date    Coronary artery disease     COVID-19 02/15/2022    Diabetes mellitus     Hyperlipidemia     Hypertension     Sleep apnea     NO CPAP    Vitamin B 12 deficiency     Vitamin D deficiency      Social History     Socioeconomic History    Marital status: Single    Number of children: 1   Tobacco Use    Smoking status: Former     Current packs/day: 0.00     Average packs/day: 1 pack/day for 30.0 years (30.0 ttl pk-yrs)     Types: Cigarettes     Start date: 1992     Quit date: 2022     Years since quittin.9    Smokeless tobacco: Never   Vaping Use    Vaping status: Never Used   Substance  and Sexual Activity    Alcohol use: Never    Drug use: Not Currently    Sexual activity: Defer       Problem list reviewed by Karen Swanson PharmD on 12/19/2024 at  1:41 PM    Allergies  Known allergies and reactions were discussed with the patient. The patient's chart has been reviewed for  allergy information and updated as necessary.   No Known Allergies    Allergies reviewed by Karen Swanson PharmD on 12/19/2024 at  1:40 PM    Relevant Laboratory Values  Relevant laboratory values were discussed with the patient. The following specialty medication dose adjustment(s) are recommended: See plan, if applicable   Lab Results   Component Value Date    CHOL 237 (H) 03/31/2022    TRIG 241 (H) 03/31/2022    HDL 33 (L) 03/31/2022     (H) 03/31/2022         Current Medication List  This medication list has been reviewed with the patient and evaluated for any interactions or necessary modifications/recommendations, and updated to include all prescription medications, OTC medications, and supplements the patient is currently taking.  This list reflects what is contained in the patient's profile, which has also been marked as reviewed to communicate to other providers it is the most up to date version of the patient's current medication therapy.     Current Outpatient Medications:     amLODIPine (NORVASC) 10 MG tablet, Take 1 tablet by mouth Daily., Disp: , Rfl:     aspirin 325 MG tablet, Take 1 tablet by mouth Daily. PT CURRENTLY TAKING THE 325MG, Disp: , Rfl:     Cholecalciferol (Vitamin D3) 1.25 MG (89256 UT) capsule, Take 1 capsule by mouth 1 (One) Time Per Week., Disp: , Rfl:     clopidogrel (PLAVIX) 75 MG tablet, Take 1 tablet by mouth Daily. HOLD STARTING 12/18/2022, Disp: 90 tablet, Rfl: 3    Cyanocobalamin (VITAMIN B 12 PO), Take  by mouth., Disp: , Rfl:     empagliflozin (Jardiance) 10 MG tablet tablet, Take 1 tablet by mouth Daily., Disp: 30 tablet, Rfl: 11    Evolocumab (REPATHA) solution  auto-injector SureClick injection, Inject 1 mL under the skin into the appropriate area as directed Every 14 (Fourteen) Days., Disp: 2 mL, Rfl: 5    folic acid (FOLVITE) 1 MG tablet, Take 1 tablet by mouth Daily., Disp: , Rfl:     metFORMIN ER (GLUCOPHAGE-XR) 500 MG 24 hr tablet, Take 1 tablet by mouth 2 (Two) Times a Day., Disp: , Rfl:     metoprolol succinate XL (TOPROL-XL) 50 MG 24 hr tablet, Take 1 tablet by mouth Daily., Disp: 90 tablet, Rfl: 3    rosuvastatin (CRESTOR) 20 MG tablet, Take 1 tablet by mouth Daily., Disp: 90 tablet, Rfl: 3    sacubitril-valsartan (ENTRESTO) 49-51 MG tablet, Take 1 tablet by mouth 2 (Two) Times a Day., Disp: 60 tablet, Rfl: 11    spironolactone (ALDACTONE) 25 MG tablet, Take 1 tablet by mouth Daily., Disp: 30 tablet, Rfl: 6    nitroglycerin (NITROSTAT) 0.4 MG SL tablet, 1 under the tongue as needed for angina, may repeat q5mins for up three doses (Patient not taking: Reported on 12/19/2024), Disp: 30 tablet, Rfl: 2    Medicines reviewed by Karen Swanson, PharmD on 12/19/2024 at  1:41 PM    Drug Interactions  None with repatha    Adherence and Self-Administration  Adherence related to the patient's specialty therapy was discussed with the patient. The Adherence segment of this outreach has been reviewed and updated.     Is there a concern with patient's ability to self administer the medication correctly and without issue?: No  Were any potential barriers to adherence identified during the initial assessment or patient education?: No  Are there any concerns regarding the patient's understanding of the importance of medication adherence?: No  Methods for Supporting Patient Adherence and/or Self-Administration: see plan, if applicable     Open Medication Therapy Problems  No medication therapy recommendations to display    Goals of Therapy  Goals related to the patient's specialty therapy were discussed with the patient. The Patient Goals segment of this outreach has been  reviewed and updated.   Goals Addressed Today        Specialty Pharmacy General Goal      LDL < 70 mg/dl    12/19/24 [place]: LDL at goal at 34 mg/dl on labs 11/26/24              Medication Assessment & Plan  Medication Therapy Changes: Patient started today on repatha 140 mg SC every 2 weeks.   Injection training and medication education provided.   Welcome information and patient satisfaction survey to be sent by specialty pharmacy team with patient's initial fill.  Related Plans, Therapy Recommendations, or Therapy Problems to Be Addressed: none  LDL at goal   Patient will continue regular follow-up with cardiology. Next 1/29/2025  Patient will follow up with specialty pharmacy mail-out services for next injection. Care Coordinator to set up future refill outreaches, coordinate prescription delivery, and escalate clinical questions to pharmacist.  Pharmacist to perform regular assessments no more than (6) months from the previous assessment. Will follow-up in 6 months, or sooner if needed.    Initial Education Provided for Specialty Medication  The patient has been provided with the following education and any applicable administration techniques (i.e. self-injection) have been demonstrated for the therapies indicated. All questions and concerns have been addressed prior to the patient receiving the medication, and the patient has verbalized comprehension of the education and any materials provided. Additional patient education shall be provided and documented upon request by the patient, provider, or payer.    REPATHA® (evolocumab)  Medication Expectations   Why am I taking this medication? You are taking Repatha to lower your “bad” cholesterol (LDL-C). This medication can be used in adults with high blood cholesterol including primary hyperlipidemia and familial hypercholesterolemia.    What should I expect while on this medication? You should expect to see your cholesterol improve over time. Specifically, you  should see your LDL-C decrease.    How does the medication work? Repatha works by blocking a protein called PCSK9 that contributes to high levels of bad cholesterol. It helps increase your liver's ability to remove bad cholesterol from your blood.     How long will I be on this medication for? The amount of time you will be on this medication will be determined by your doctor based on your cholesterol and/or your risk of having a cardiac event. You will most likely be on this medication or another cholesterol medication throughout your lifetime. Do not abruptly stop this medication without talking to your doctor first.    How do I take this medication? Take as directed on your prescription label. Repatha is injected under the skin (subcutaneously) of your stomach, thigh, or upper arm. This medication is usually given one or twice a month.   What are some possible side effects? The most common side effects of Repatha include redness, itching, swelling, or pain/tenderness at the injection site, symptoms of the common cold, flu or flu-like symptoms or back pain.    What happens if I miss a dose? If you miss a dose, take it as soon as you remember if it is within 7 days from the usual day of administration then resume your original schedule. If it is beyond 7 days and you use the horacio-2-week dose, skip the missed dose and resume your normal dosing schedule.If it is beyond 7 days and you use the once-monthly dose, inject the dose and start a new schedule based on that date.      Medication Safety   What are things I should warn my doctor immediately about? Talk to your doctor if you are pregnant, planning to become pregnant, or breastfeeding. Stop the medication and tell your doctor or seek emergency medical help if you notice any signs/symptoms of an allergic reaction (severe rash, redness, hives, severe itching, trouble breathing, or swelling of the face, lips, or tongue). If you have a rubber or latex allergy, you  should not use the Repatha SureClick® Autoinjector pen or the prefilled syringe, please notify your doctor or pharmacist.   What are things that I should be cautious of? Be cautious of any side effects from this medication. Talk to your doctor if any new ones develop or aren't getting better.   What are some medications that can interact with this one? There are no known significant drug interactions with Repatha. Always tell your doctor or pharmacist immediately if you start taking any new medications, including over-the-counter medications, vitamins, and herbal supplements.      Medication Storage/Handling   How should I handle this medication? Do not shake or expose the pens, cartridges, or syringes to extreme heat or direct sunlight. Keep this medication out of reach of pets/children. Allow medication to warm at room temperature prior to administration.   How does this medication need to be stored? Store unused pens, cartridges, or syringes in the refrigerator in the original cartons to protect from light. If needed, Repatha may be kept at room temperature in the original carton for up to 30 days. Do not freeze.    How should I dispose of this medication? All the Repatha devices are single-dose and should be discarded in a sharps container after use. If your doctor decides to stop this medication, take to your local police station for proper disposal. Some pharmacies also have take-back bins for medication drop-off.      Resources/Support   How can I remind myself to take this medication? You can download reminder apps to help you manage your refills. You may also set an alarm on your phone to remind you to take your dose.    Is financial support available?  [employer] can provide co-pay cards if you have commercial insurance or patient assistance if you have Medicare or no insurance.    Which vaccines are recommended for me? Talk to your doctor about these vaccines: Flu, Coronavirus (COVID-19), Pneumococcal  (pneumonia), Tdap, Hepatitis B, Zoster (shingles)         Attestation  Therapeutic appropriateness: Appropriate   I attest the patient was actively involved in and has agreed to the above plan of care. If the prescribed therapy is at any point deemed not appropriate based on the current or future assessments, a consultation will be initiated with the patient's specialty care provider to determine the best course of action. The revised plan of therapy will be documented along with any required assessments and/or additional patient education provided.     Karen Swanson, PharmD  12/19/2024  13:42 EST

## 2025-01-29 ENCOUNTER — OFFICE VISIT (OUTPATIENT)
Dept: CARDIOLOGY | Facility: CLINIC | Age: 50
End: 2025-01-29
Payer: COMMERCIAL

## 2025-01-29 VITALS
HEART RATE: 77 BPM | WEIGHT: 272.8 LBS | SYSTOLIC BLOOD PRESSURE: 135 MMHG | BODY MASS INDEX: 33.22 KG/M2 | DIASTOLIC BLOOD PRESSURE: 79 MMHG | OXYGEN SATURATION: 97 % | HEIGHT: 76 IN

## 2025-01-29 DIAGNOSIS — I10 ESSENTIAL HYPERTENSION: Primary | ICD-10-CM

## 2025-01-29 DIAGNOSIS — I51.89 GRADE I DIASTOLIC DYSFUNCTION: ICD-10-CM

## 2025-01-29 DIAGNOSIS — I25.5 ISCHEMIC CARDIOMYOPATHY: ICD-10-CM

## 2025-01-29 DIAGNOSIS — I25.10 CORONARY ARTERY DISEASE INVOLVING NATIVE CORONARY ARTERY OF NATIVE HEART WITHOUT ANGINA PECTORIS: ICD-10-CM

## 2025-01-29 RX ORDER — SPIRONOLACTONE 25 MG/1
25 TABLET ORAL DAILY
Qty: 90 TABLET | Refills: 3 | Status: SHIPPED | OUTPATIENT
Start: 2025-01-29

## 2025-01-29 RX ORDER — METOPROLOL SUCCINATE 50 MG/1
50 TABLET, EXTENDED RELEASE ORAL DAILY
Qty: 90 TABLET | Refills: 3 | Status: SHIPPED | OUTPATIENT
Start: 2025-01-29

## 2025-01-29 NOTE — PROGRESS NOTES
Subjective     Stone Villeda is a 49 y.o. male who presents to day for 6 month follow up, Hypertension, and Cardiomyopathy.    CHIEF COMPLIANT  Chief Complaint   Patient presents with    6 month follow up    Hypertension    Cardiomyopathy       Active Problems:  Problem List Items Addressed This Visit          Cardiac and Vasculature    Essential hypertension - Primary    Relevant Medications    metoprolol succinate XL (TOPROL-XL) 50 MG 24 hr tablet    spironolactone (ALDACTONE) 25 MG tablet    Grade I diastolic dysfunction    Relevant Medications    sacubitril-valsartan (ENTRESTO) 49-51 MG tablet    Coronary artery disease involving native coronary artery of native heart without angina pectoris    Relevant Medications    metoprolol succinate XL (TOPROL-XL) 50 MG 24 hr tablet    sacubitril-valsartan (ENTRESTO) 49-51 MG tablet    Ischemic cardiomyopathy    Relevant Medications    metoprolol succinate XL (TOPROL-XL) 50 MG 24 hr tablet    sacubitril-valsartan (ENTRESTO) 49-51 MG tablet   Problem list:   1.  CAD  1.1 left heart cath 7/19/2021 - 50% narrowing in the proximal portion of the LAD, diffuse irregularities throughout the circumflex with 30 to 50% narrowing before the PDA, right coronary artery totally occluded just proximal to the acute margin, however FFR was 0.85.  EF 40 to 45%, LVEDP 12-14  1.2 left heart cath 12/9/2022-left main mild irregularities, LAD 60 to 70% stenosis, left circumflex 20-30 mid and distal, right coronary artery 99% stenosis, marginal branch 95 to 90% stenosis, FFR 0.78, hemodynamic significant LAD, subtotal occlusion of the mid RCA, consideration for CABG due to the fact patient is diabetic  1.3 CABG times 212/23/2022, Dr. Beltrán to the LAD, vein graft to the posterior descending branch  1.4 left heart cath 9/23:Left main diffusely irregular, 50 to 70% to the circumflex, LAD 50 to 70% proximal, ramus irregularities, RCA severely diffusely diseased course, vein graft to PDA  widely patent, LIMA to LAD patent throughout its course with no distal disease, EF 45 to 50%, LVEDP 12-14  2.  Hypertension  3.  Hyperlipidemia  4.  Palpitations  4.1 event monitor 6/10-6/23/2021-PVCs, NSVT (5 beat)  5.  Shortness of breath  5.1 Echo 7/23: EF 60 to 65%, grade 1A diastolic dysfunction, trivial MR AI and physiological TR  6.   Renal artery ultrasound 6/21/2021-no hemodynamically significant renal artery stenosis  7.  DARON, CPAP    HPI  HPI  Mr. Stone Frazier is a 49-year-old male patient who is being followed up today for history of coronary artery disease and chronic arterial hypertension.     Patient also has a history of coronary artery disease and which he did go under coronary artery bypass grafting in 2022 with LIMA to LAD and SVG to the posterior descending.  He had a left heart catheterization after that that showed left main diffusely irregular, 50 to 70% in the circumflex, 50 to 70% proximal LAD, ramus had minimal irregularities, RCA severely diffusely diseased vein graft to PDA was widely patent LIMA to LAD patent throughout its course with no distal disease EF is 45 to 50% with an LVEDP of 12-14.  He is on aspirin and Plavix for dual antiplatelet therapy as well as rosuvastatin for high intensity statin therapy     Patient does have heart failure with reduced ejection fraction and which he is on guideline driven medication therapy including metoprolol succinate, Entresto, Jardiance, and spironolactone.  We will continue these medications without change.     Patient does have chronic arterial hypertension and which she is being treated with Entresto, spironolactone, and metoprolol.  Today's blood pressure is 135/79 heart rate of 77    Triglycerides elevated 156, HDL 40, LDL 34. Continue current medications without change. Please advise patient decrease foods that are high in carbohydrates such as breads sweets pastas rice potatoes.    Overall patient seems to be doing well from the  cardiovascular standpoint.  He denies any significant angina anginal equivalent symptoms.  He says over the last 6 months he has been a lot more active for and doing a lot more than what he previously was able to do.  He denies any chest pain, shortness of breath, syncope, or strokelike symptoms.  PRIOR MEDS  Current Outpatient Medications on File Prior to Visit   Medication Sig Dispense Refill    amLODIPine (NORVASC) 10 MG tablet Take 1 tablet by mouth Daily.      aspirin 325 MG tablet Take 1 tablet by mouth Daily. PT CURRENTLY TAKING THE 325MG      Cholecalciferol (Vitamin D3) 1.25 MG (35866 UT) capsule Take 1 capsule by mouth 1 (One) Time Per Week.      clopidogrel (PLAVIX) 75 MG tablet Take 1 tablet by mouth Daily. HOLD STARTING 12/18/2022 90 tablet 3    Cyanocobalamin (VITAMIN B 12 PO) Take  by mouth.      empagliflozin (Jardiance) 10 MG tablet tablet Take 1 tablet by mouth Daily. 30 tablet 11    Evolocumab (REPATHA) solution auto-injector SureClick injection Inject 1 mL under the skin into the appropriate area as directed Every 14 (Fourteen) Days. 2 mL 5    folic acid (FOLVITE) 1 MG tablet Take 1 tablet by mouth Daily.      metFORMIN ER (GLUCOPHAGE-XR) 500 MG 24 hr tablet Take 1 tablet by mouth 2 (Two) Times a Day.      nitroglycerin (NITROSTAT) 0.4 MG SL tablet 1 under the tongue as needed for angina, may repeat q5mins for up three doses 30 tablet 2    rosuvastatin (CRESTOR) 20 MG tablet Take 1 tablet by mouth Daily. 90 tablet 3    [DISCONTINUED] metoprolol succinate XL (TOPROL-XL) 50 MG 24 hr tablet Take 1 tablet by mouth Daily. 90 tablet 3    [DISCONTINUED] sacubitril-valsartan (ENTRESTO) 49-51 MG tablet Take 1 tablet by mouth 2 (Two) Times a Day. 60 tablet 11    [DISCONTINUED] spironolactone (ALDACTONE) 25 MG tablet Take 1 tablet by mouth Daily. 30 tablet 6     No current facility-administered medications on file prior to visit.       ALLERGIES  Patient has no known allergies.    HISTORY  Past Medical  History:   Diagnosis Date    Coronary artery disease     COVID-19 02/15/2022    Diabetes mellitus     Hyperlipidemia     Hypertension     Sleep apnea     NO CPAP    Vitamin B 12 deficiency     Vitamin D deficiency        Social History     Socioeconomic History    Marital status: Single    Number of children: 1   Tobacco Use    Smoking status: Former     Current packs/day: 0.00     Average packs/day: 1 pack/day for 30.0 years (30.0 ttl pk-yrs)     Types: Cigarettes     Start date: 1992     Quit date: 2022     Years since quittin.1    Smokeless tobacco: Never   Vaping Use    Vaping status: Never Used   Substance and Sexual Activity    Alcohol use: Never    Drug use: Not Currently    Sexual activity: Defer       Family History   Problem Relation Age of Onset    Breast cancer Mother     Hypertension Father     Heart attack Father     Heart attack Brother     No Known Problems Maternal Grandmother     No Known Problems Maternal Grandfather     No Known Problems Paternal Grandmother     Heart disease Paternal Grandfather        Review of Systems   Constitutional:  Negative for chills, fatigue and fever.   HENT:  Negative for congestion, rhinorrhea and sore throat.    Eyes:  Negative for visual disturbance.   Respiratory:  Negative for apnea, chest tightness and shortness of breath.    Cardiovascular:  Negative for chest pain, palpitations and leg swelling.   Gastrointestinal:  Negative for constipation, diarrhea and nausea.   Musculoskeletal:  Negative for arthralgias, back pain and neck pain.   Skin:  Negative for rash and wound.   Allergic/Immunologic: Positive for environmental allergies (in am) and food allergies (chocolate).   Neurological:  Negative for dizziness, syncope, weakness and light-headedness.   Hematological:  Does not bruise/bleed easily.   Psychiatric/Behavioral:  Negative for sleep disturbance.        Objective     VITALS: /79 (BP Location: Left arm, Patient Position: Sitting,  "Cuff Size: Adult)   Pulse 77   Ht 193 cm (75.98\")   Wt 124 kg (272 lb 12.8 oz)   SpO2 97%   BMI 33.22 kg/m²     LABS:   Lab Results (most recent)       None            IMAGING:   No Images in the past 120 days found..    EXAM:  Physical Exam  Vitals and nursing note reviewed.   Constitutional:       Appearance: He is well-developed.   HENT:      Head: Normocephalic.   Neck:      Thyroid: No thyroid mass.      Vascular: No carotid bruit or JVD.      Trachea: Trachea and phonation normal.   Cardiovascular:      Rate and Rhythm: Normal rate and regular rhythm.      Pulses:           Radial pulses are 2+ on the right side and 2+ on the left side.        Posterior tibial pulses are 2+ on the right side and 2+ on the left side.      Heart sounds: Normal heart sounds. No murmur heard.     No friction rub. No gallop.   Pulmonary:      Effort: Pulmonary effort is normal. No respiratory distress.      Breath sounds: Normal breath sounds. No wheezing or rales.   Musculoskeletal:         General: No swelling. Normal range of motion.      Cervical back: Neck supple.   Skin:     General: Skin is warm and dry.      Capillary Refill: Capillary refill takes less than 2 seconds.      Findings: No rash.   Neurological:      Mental Status: He is alert and oriented to person, place, and time.   Psychiatric:         Speech: Speech normal.         Behavior: Behavior normal.         Thought Content: Thought content normal.         Judgment: Judgment normal.         Procedure   Procedures       Assessment & Plan    Diagnosis Plan   1. Essential hypertension        2. Grade I diastolic dysfunction  sacubitril-valsartan (ENTRESTO) 49-51 MG tablet      3. Ischemic cardiomyopathy  sacubitril-valsartan (ENTRESTO) 49-51 MG tablet      4. Coronary artery disease involving native coronary artery of native heart without angina pectoris        1.  Patient's blood pressure is well-controlled today on his current blood pressure medication regimen. "  No medication changes are recommended at this time.  Patient does report that his diastolic blood pressure does get elevated at times.  He will continue to monitor his blood pressure on routine basis report any significant highs or lows.  2.  Patient does have a history of coronary artery disease in which he is doing well.  His increased functional capacity and no anginal symptoms.  Will continue to follow at this time.    3.  Patient does have a history of ischemic cardiomyopathy and which seems to be stable.  He is on guideline driven medication therapy and tolerating well.  Will continue without change at this time.    4.  Patient was advised to seek emergent treatment if any new or worsening symptoms.  Notify the office for sooner follow-up if needed.  Follow-up with primary care as needed.    Return in about 6 months (around 7/29/2025), or if symptoms worsen or fail to improve.    Diagnoses and all orders for this visit:    1. Essential hypertension (Primary)    2. Grade I diastolic dysfunction  -     Discontinue: sacubitril-valsartan (ENTRESTO) 49-51 MG tablet; Take 1 tablet by mouth 2 (Two) Times a Day.  Dispense: 60 tablet; Refill: 11  -     sacubitril-valsartan (ENTRESTO) 49-51 MG tablet; Take 1 tablet by mouth 2 (Two) Times a Day.  Dispense: 180 tablet; Refill: 3    3. Ischemic cardiomyopathy  -     Discontinue: sacubitril-valsartan (ENTRESTO) 49-51 MG tablet; Take 1 tablet by mouth 2 (Two) Times a Day.  Dispense: 60 tablet; Refill: 11  -     sacubitril-valsartan (ENTRESTO) 49-51 MG tablet; Take 1 tablet by mouth 2 (Two) Times a Day.  Dispense: 180 tablet; Refill: 3    4. Coronary artery disease involving native coronary artery of native heart without angina pectoris    Other orders  -     metoprolol succinate XL (TOPROL-XL) 50 MG 24 hr tablet; Take 1 tablet by mouth Daily.  Dispense: 90 tablet; Refill: 3  -     spironolactone (ALDACTONE) 25 MG tablet; Take 1 tablet by mouth Daily.  Dispense: 90 tablet;  Refill: 3        Stone Villeda  reports that he quit smoking about 2 years ago. His smoking use included cigarettes. He started smoking about 32 years ago. He has a 30 pack-year smoking history. He has never used smokeless tobacco. I have educated him on the risk of diseases from using tobacco products. Patient does not smoke.                    MEDS ORDERED DURING VISIT:  New Medications Ordered This Visit   Medications    metoprolol succinate XL (TOPROL-XL) 50 MG 24 hr tablet     Sig: Take 1 tablet by mouth Daily.     Dispense:  90 tablet     Refill:  3    spironolactone (ALDACTONE) 25 MG tablet     Sig: Take 1 tablet by mouth Daily.     Dispense:  90 tablet     Refill:  3    sacubitril-valsartan (ENTRESTO) 49-51 MG tablet     Sig: Take 1 tablet by mouth 2 (Two) Times a Day.     Dispense:  180 tablet     Refill:  3           This document has been electronically signed by Landry Walker Jr., APRN  January 29, 2025 09:22 EST

## 2025-03-05 ENCOUNTER — SPECIALTY PHARMACY (OUTPATIENT)
Dept: PHARMACY | Facility: HOSPITAL | Age: 50
End: 2025-03-05
Payer: COMMERCIAL

## 2025-03-05 NOTE — PROGRESS NOTES
Specialty Pharmacy Patient Management Program  Medication Management Clinic Refill Outreach      Stone was contacted today regarding refills of his medication(s).    Specialty medication(s) and dose(s) confirmed: repatha sureclick    Refill Questions      Flowsheet Row Most Recent Value   Changes to allergies? No   Changes to medications? No   New conditions or infections since last clinic visit No   Unplanned office visit, urgent care, ED, or hospital admission in the last 4 weeks  No   How does patient/caregiver feel medication is working? Very good   Financial problems or insurance changes  No   Since the previous refill, were any specialty medication doses or scheduled injections missed or delayed?  No   Does this patient require a clinical escalation to a pharmacist? No          Delivery Questions      Flowsheet Row Most Recent Value   Delivery method UPS   Delivery address verified with patient/caregiver? Yes   Delivery address Home   Number of medications in delivery 1   Medication(s) being filled and delivered Evolocumab (REPATHA)   Doses left of specialty medications 1   Copay verified? Yes   Copay amount $0.00   Copay form of payment No copayment ($0)   Delivery Date Selection 03/07/25   Signature Required Yes            Follow-Up: 84 days    Karen Swanson, PharmD  3/5/2025  10:52 EST

## 2025-05-29 ENCOUNTER — TELEPHONE (OUTPATIENT)
Dept: CARDIOLOGY | Facility: HOSPITAL | Age: 50
End: 2025-05-29
Payer: COMMERCIAL

## 2025-05-29 NOTE — TELEPHONE ENCOUNTER
Attempted to call pt to complete follow-up clinical assessment for Repatha, pt did not answer and not able to leave message.    Jose Mukherjee ISABEL  05/29/25  13:00 EDT

## 2025-06-04 ENCOUNTER — SPECIALTY PHARMACY (OUTPATIENT)
Dept: CARDIOLOGY | Facility: HOSPITAL | Age: 50
End: 2025-06-04
Payer: COMMERCIAL

## 2025-06-04 DIAGNOSIS — E78.5 HYPERLIPIDEMIA, UNSPECIFIED HYPERLIPIDEMIA TYPE: Primary | ICD-10-CM

## 2025-06-04 NOTE — PROGRESS NOTES
Medication Management Clinic/ Specialty Pharmacy Patient Management Program  Lipid Management Program - PCSK9i Follow-Up Assessment     Stone Villeda is a 50 y.o. male referred by their provider, Landry WEBSTER, to the Hyperlipidemia Patient Management program offered by [facility] & Specialty Pharmacy for Lipid Management.  Stone Villeda is  treated for clinical ASCVD and currently takes Crestor 40mg daily and Repatha 140mg subq every two weeks. The patient denies any allergies to latex.       A follow-up outreach was conducted, including assessment of continued therapy appropriateness, medication adherence, and side effect incidence and management for Repatha. The patient denies any trouble giving themself the injection.  They deny missing doses or adverse effects.     Initial Start Date of PCSK9i: 2024  Initial LDL: 185 mg/dL on 24    Changes to Insurance Coverage or Financial Support  [place] Medicaid    Relevant Past Medical History and Comorbidities  Relevant medical history and concomitant health conditions were discussed with the patient. The patient's chart has been reviewed for relevant past medical history and comorbid health conditions and updated as necessary.   Past Medical History:   Diagnosis Date    Coronary artery disease     COVID-19 02/15/2022    Diabetes mellitus     Hyperlipidemia     Hypertension     Sleep apnea     NO CPAP    Vitamin B 12 deficiency     Vitamin D deficiency      Social History     Socioeconomic History    Marital status: Single    Number of children: 1   Tobacco Use    Smoking status: Former     Current packs/day: 0.00     Average packs/day: 1 pack/day for 30.0 years (30.0 ttl pk-yrs)     Types: Cigarettes     Start date: 1992     Quit date: 2022     Years since quittin.4    Smokeless tobacco: Never   Vaping Use    Vaping status: Never Used   Substance and Sexual Activity    Alcohol use: Never    Drug use:  Not Currently    Sexual activity: Defer     Problem list reviewed by Jose Mukherjee RPH on 6/4/2025 at  1:31 PM    Hospitalizations and Urgent Care Since Last Assessment  ED Visits, Admissions, or Hospitalizations: none per pt  Urgent Office Visits: none per pt    Allergies  Known allergies and reactions were discussed with the patient. The patient's chart has been reviewed for allergy information and updated as necessary.   No Known Allergies  Allergies reviewed by Jose Mukherjee RPH on 6/4/2025 at  1:19 PM    Relevant Laboratory Values  Relevant laboratory values were discussed with the patient. The following specialty medication dose adjustment(s) are recommended: n/A    Lab Results   Component Value Date    GLUCOSE 117 (H) 12/30/2022    CALCIUM 8.4 (L) 12/30/2022     (L) 12/30/2022    K 4.3 12/30/2022    CO2 25.0 12/30/2022    CL 97 (L) 12/30/2022    BUN 14 12/30/2022    CREATININE 0.72 (L) 12/30/2022    BCR 19.4 12/30/2022    ANIONGAP 9.0 12/30/2022     Lab Results   Component Value Date    CHOL 237 (H) 03/31/2022    TRIG 241 (H) 03/31/2022    HDL 33 (L) 03/31/2022     (H) 03/31/2022       Current Medication List  This medication list has been reviewed with the patient and evaluated for any interactions or necessary modifications/recommendations, and updated to include all prescription medications, OTC medications, and supplements the patient is currently taking.  This list reflects what is contained in the patient's profile, which has also been marked as reviewed to communicate to other providers it is the most up to date version of the patient's current medication therapy.     Current Outpatient Medications:     amLODIPine (NORVASC) 10 MG tablet, Take 1 tablet by mouth Daily., Disp: , Rfl:     aspirin 325 MG tablet, Take 1 tablet by mouth Daily. PT CURRENTLY TAKING THE 325MG, Disp: , Rfl:     Cholecalciferol (Vitamin D3) 1.25 MG (58576 UT) capsule, Take 1 capsule by mouth 1 (One) Time Per  Week., Disp: , Rfl:     clopidogrel (PLAVIX) 75 MG tablet, Take 1 tablet by mouth Daily. HOLD STARTING 12/18/2022 (Patient taking differently: Take 1 tablet by mouth Daily.), Disp: 90 tablet, Rfl: 3    Cyanocobalamin (VITAMIN B 12 PO), Take 1,000 mcg by mouth Daily., Disp: , Rfl:     empagliflozin (Jardiance) 10 MG tablet tablet, Take 1 tablet by mouth Daily., Disp: 30 tablet, Rfl: 11    Evolocumab (REPATHA) solution auto-injector SureClick injection, Inject 1 mL under the skin into the appropriate area as directed Every 14 (Fourteen) Days., Disp: 2 mL, Rfl: 5    folic acid (FOLVITE) 1 MG tablet, Take 1 tablet by mouth Daily., Disp: , Rfl:     metFORMIN ER (GLUCOPHAGE-XR) 500 MG 24 hr tablet, Take 1 tablet by mouth 2 (Two) Times a Day., Disp: , Rfl:     metoprolol succinate XL (TOPROL-XL) 50 MG 24 hr tablet, Take 1 tablet by mouth Daily., Disp: 90 tablet, Rfl: 3    rosuvastatin (CRESTOR) 20 MG tablet, Take 1 tablet by mouth Daily. (Patient taking differently: Take 2 tablets by mouth Daily.), Disp: 90 tablet, Rfl: 3    sacubitril-valsartan (ENTRESTO) 49-51 MG tablet, Take 1 tablet by mouth 2 (Two) Times a Day., Disp: 180 tablet, Rfl: 3    spironolactone (ALDACTONE) 25 MG tablet, Take 1 tablet by mouth Daily., Disp: 90 tablet, Rfl: 3    nitroglycerin (NITROSTAT) 0.4 MG SL tablet, 1 under the tongue as needed for angina, may repeat q5mins for up three doses (Patient not taking: Reported on 6/4/2025), Disp: 30 tablet, Rfl: 2    Medicines reviewed by Jose Mukherjee RPH on 6/4/2025 at  1:23 PM    Drug Interactions  None with Repatha per literature.    Pt denies any bleeding issues with the Aspirin and Plavix.    Of note, pt states that he has muscle pains at baseline from his work and that the Crestor and Repatha  have not increased any of his muscle pains he experiences at baseline.    Adverse Drug Reactions  Medication tolerability: Tolerating with no to minimal ADRs  Medication plan: Continue therapy with normal  follow-up  Plan for ADR Management: N/A    Adherence, Self-Administration, and Current Therapy Problems  Adherence related to the patient's specialty therapy was discussed with the patient. The Adherence segment of this outreach has been reviewed and updated.     Adherence Questions  Linked Medication(s) Assessed: Evolocumab (REPATHA)  On average, how many doses/injections does the patient miss per month?: 0  What are the identified reasons for non-adherence or missed doses? : no problems identified  What is the estimated medication adherence level?: %  Based on the patient/caregiver response and refill history, does this patient require an MTP to track adherence improvements?: no    Additional Barriers to Patient Self-Administration: N/A  Methods for Supporting Patient Self-Administration: N/A    Open Medication Therapy Problems  No medication therapy recommendations to display    Goals of Therapy  Goals related to the patient's specialty therapy were discussed with the patient. The Patient Goals segment of this outreach has been reviewed and updated.   Goals Addressed Today        Specialty Pharmacy General Goal      LDL < 55 mg/dl    12/19/24 MN: LDL at goal at 34 mg/dl on labs 11/26/24 6/4/25 TF: LDL at goal with LDL of 34mg/dL on 11/26/24.  Pt will continue Repatha.  Lipid panel to be repeated within the year.              Quality of Life Assessment   Quality of Life related to the patient's enrollment in the patient management program and services provided was discussed with the patient. The QOL segment of this outreach has been reviewed and updated.  Quality of Life Improvement Scale: 10-Significantly better    Reassessment Plan & Follow-Up  1. Medication Therapy Changes: none, continue Repatha 140mg subq every 14 days  2. Related Plans, Therapy Recommendations, or Issues to Be Addressed:    LDL at goal  3. Pharmacist to perform regular assessments no more than (6) months from the previous  assessment.  Patient will continue regular follow-up with referring provider.   4. Care Coordinator to set up future refill outreaches, coordinate prescription delivery, and escalate clinical questions to pharmacist.    Attestation  Therapeutic appropriateness: Appropriate   I attest the patient was actively involved in and has agreed to the above plan of care.  If the prescribed therapy is at any point deemed not appropriate based on the current or future assessments, a consultation will be initiated with the patient's specialty care provider to determine the best course of action. The revised plan of therapy will be documented along with any required assessments and/or additional patient education provided.     Jose Mukherjee RPH  6/4/2025  13:34 EDT

## 2025-06-04 NOTE — PROGRESS NOTES
Specialty Pharmacy Patient Management Program  [facility] Refill Outreach      Stone was contacted today regarding refills of his medication(s).    Specialty medication(s) and dose(s) confirmed: Repatha    Refill Questions      Flowsheet Row Most Recent Value   Changes to allergies? No   Changes to medications? No   New conditions or infections since last clinic visit No   Unplanned office visit, urgent care, ED, or hospital admission in the last 4 weeks  No   How does patient/caregiver feel medication is working? Very good   Financial problems or insurance changes  No   Since the previous refill, were any specialty medication doses or scheduled injections missed or delayed?  No   Does this patient require a clinical escalation to a pharmacist? No          Delivery Questions      Flowsheet Row Most Recent Value   Delivery method [employer]   Delivery address verified with patient/caregiver? Yes   Delivery address Home   Number of medications in delivery 1   Medication(s) being filled and delivered Evolocumab (REPATHA)   Doses left of specialty medications 0   Copay verified? Yes   Copay amount $0   Copay form of payment No copayment ($0)   Delivery Date Selection 06/06/25   Signature Required Yes   Do you consent to receive electronic handouts?  No            Follow-Up: 3 months    Jose Mukherjee RPH  6/4/2025  13:26 EDT

## 2025-07-15 DIAGNOSIS — I25.119 CORONARY ARTERY DISEASE INVOLVING NATIVE CORONARY ARTERY OF NATIVE HEART WITH ANGINA PECTORIS: ICD-10-CM

## 2025-07-15 RX ORDER — NITROGLYCERIN 0.4 MG/1
TABLET SUBLINGUAL
Qty: 30 TABLET | Refills: 2 | Status: SHIPPED | OUTPATIENT
Start: 2025-07-15

## 2025-08-04 ENCOUNTER — OFFICE VISIT (OUTPATIENT)
Dept: CARDIOLOGY | Facility: CLINIC | Age: 50
End: 2025-08-04
Payer: COMMERCIAL

## 2025-08-04 VITALS
OXYGEN SATURATION: 99 % | HEART RATE: 73 BPM | WEIGHT: 270.2 LBS | BODY MASS INDEX: 32.9 KG/M2 | DIASTOLIC BLOOD PRESSURE: 82 MMHG | HEIGHT: 76 IN | SYSTOLIC BLOOD PRESSURE: 137 MMHG

## 2025-08-04 DIAGNOSIS — I10 ESSENTIAL HYPERTENSION: Primary | ICD-10-CM

## 2025-08-04 DIAGNOSIS — I25.10 CORONARY ARTERY DISEASE INVOLVING NATIVE CORONARY ARTERY OF NATIVE HEART WITHOUT ANGINA PECTORIS: ICD-10-CM

## 2025-08-04 DIAGNOSIS — I25.5 ISCHEMIC CARDIOMYOPATHY: ICD-10-CM

## 2025-08-04 PROCEDURE — 99214 OFFICE O/P EST MOD 30 MIN: CPT | Performed by: NURSE PRACTITIONER

## 2025-08-04 PROCEDURE — 1160F RVW MEDS BY RX/DR IN RCRD: CPT | Performed by: NURSE PRACTITIONER

## 2025-08-04 PROCEDURE — 3075F SYST BP GE 130 - 139MM HG: CPT | Performed by: NURSE PRACTITIONER

## 2025-08-04 PROCEDURE — 3079F DIAST BP 80-89 MM HG: CPT | Performed by: NURSE PRACTITIONER

## 2025-08-04 PROCEDURE — 1159F MED LIST DOCD IN RCRD: CPT | Performed by: NURSE PRACTITIONER

## 2025-08-04 RX ORDER — ROSUVASTATIN CALCIUM 40 MG/1
40 TABLET, COATED ORAL DAILY
COMMUNITY

## 2025-08-08 ENCOUNTER — TELEPHONE (OUTPATIENT)
Dept: CARDIOLOGY | Facility: CLINIC | Age: 50
End: 2025-08-08
Payer: COMMERCIAL

## 2025-08-08 DIAGNOSIS — Z82.49 FAMILY HISTORY OF EARLY CAD: ICD-10-CM

## 2025-08-08 DIAGNOSIS — M79.602 PAIN OF LEFT UPPER EXTREMITY: ICD-10-CM

## 2025-08-08 DIAGNOSIS — R07.89 OTHER CHEST PAIN: Primary | ICD-10-CM

## 2025-08-08 DIAGNOSIS — I10 ESSENTIAL HYPERTENSION: ICD-10-CM

## 2025-08-08 DIAGNOSIS — I25.119 CORONARY ARTERY DISEASE INVOLVING NATIVE CORONARY ARTERY OF NATIVE HEART WITH ANGINA PECTORIS: ICD-10-CM

## 2025-08-08 RX ORDER — ISOSORBIDE MONONITRATE 30 MG/1
30 TABLET, EXTENDED RELEASE ORAL DAILY
Qty: 30 TABLET | Refills: 11 | Status: SHIPPED | OUTPATIENT
Start: 2025-08-08

## 2025-08-08 RX ORDER — NITROGLYCERIN 0.4 MG/1
TABLET SUBLINGUAL
Qty: 30 TABLET | Refills: 2 | Status: SHIPPED | OUTPATIENT
Start: 2025-08-08

## 2025-08-11 ENCOUNTER — SPECIALTY PHARMACY (OUTPATIENT)
Dept: CARDIOLOGY | Facility: HOSPITAL | Age: 50
End: 2025-08-11
Payer: COMMERCIAL

## 2025-08-11 DIAGNOSIS — I25.5 ISCHEMIC CARDIOMYOPATHY: ICD-10-CM

## 2025-08-11 DIAGNOSIS — I25.10 CORONARY ARTERY DISEASE INVOLVING NATIVE CORONARY ARTERY OF NATIVE HEART WITHOUT ANGINA PECTORIS: ICD-10-CM

## 2025-08-11 DIAGNOSIS — I10 ESSENTIAL HYPERTENSION: ICD-10-CM

## 2025-08-12 ENCOUNTER — RESULTS FOLLOW-UP (OUTPATIENT)
Dept: CARDIOLOGY | Facility: CLINIC | Age: 50
End: 2025-08-12
Payer: COMMERCIAL

## 2025-08-20 ENCOUNTER — SPECIALTY PHARMACY (OUTPATIENT)
Dept: CARDIOLOGY | Facility: HOSPITAL | Age: 50
End: 2025-08-20
Payer: COMMERCIAL

## (undated) DEVICE — SENSR CERBRL O2 PK/2

## (undated) DEVICE — TBG SXN INTRACARD RIDGID FLUT 24F .25X13IN A/

## (undated) DEVICE — BLD SCLPL BEAVR MINI STR 2BVL 180D LF

## (undated) DEVICE — AVANTI + 4F STD W/GW: Brand: AVANTI

## (undated) DEVICE — ELECTRD BLD EZ CLN STD 2.5IN

## (undated) DEVICE — Device

## (undated) DEVICE — Device: Brand: PERFECTCUT ROTATING AORTIC PUNCH

## (undated) DEVICE — GLV SURG BIOGEL LTX PF 7 1/2

## (undated) DEVICE — SUT PDS 1 CTX 36IN VIO PDP371T

## (undated) DEVICE — FLTR RESERV PERFUS INTERSEPT 02 STRL

## (undated) DEVICE — LEVEL SENSORS PADS ARE USED TO ATTACH THE LEVEL SENSORS TO A HARD SHELL RESERVOIR. INCLUDES COUPLING GEL.: Brand: TERUMO® ADVANCED PERFUSION SYSTEM 1

## (undated) DEVICE — TRAP FLD MINIVAC MEGADYNE 100ML

## (undated) DEVICE — SUT PROLN 6/0 C1 D/A 30IN 8706H

## (undated) DEVICE — ADAPT/Y PERFUS DLP FML/LUER COLR/CODE/CLMP 8.9AND25.4CM

## (undated) DEVICE — PATIENT RETURN ELECTRODE, SINGLE-USE, CONTACT QUALITY MONITORING, ADULT, WITH 9FT CORD, FOR PATIENTS WEIGING OVER 33LBS. (15KG): Brand: MEGADYNE

## (undated) DEVICE — PAD ARMBRD SURG CONVOL 7.5X20X2IN

## (undated) DEVICE — CANN AORT ROOT DLP VNT 14G 7F

## (undated) DEVICE — TEMP PACING WIRE: Brand: MYO/WIRE

## (undated) DEVICE — CANN VESL DLP 1WY BLNT/TP 3MM

## (undated) DEVICE — SUT PROLN 7/0 CV BV1 24IN 8304H BX/36

## (undated) DEVICE — PENCL ROCKRSWCH MEGADYNE W/HOLSTR 10FT SS

## (undated) DEVICE — SUT SILK 2 SUTUPAK TIE 60IN SA8H 2STRAND

## (undated) DEVICE — SUCTION CANISTER 2500CC: Brand: DEROYAL

## (undated) DEVICE — PK PERFUS CUST W/CARDIOPLEGIA

## (undated) DEVICE — SUT PROLN 3/0 SH D/A 36IN 8522H

## (undated) DEVICE — TBG PENCL TELESCP MEGADYNE SMOKE EVAC 10FT

## (undated) DEVICE — TUBING, SUCTION, 1/4" X 10', STRAIGHT: Brand: MEDLINE

## (undated) DEVICE — CVR PROB ULTRASND/TRANSD W/GEL 18X120CM STRL

## (undated) DEVICE — OASIS DRAIN, SINGLE, INLINE & ATS COMPATIBLE: Brand: OASIS

## (undated) DEVICE — CONNECT Y INTERSEPT W/LL 3/8 X 3/8 X 3/8IN

## (undated) DEVICE — SUT PROLN 4/0 SH D/A 36IN 8521H

## (undated) DEVICE — EZ GLIDE AORTIC CANNULA: Brand: EDWARDS LIFESCIENCES EZ GLIDE AORTIC CANNULA

## (undated) DEVICE — TOWEL,OR,DSP,ST,BLUE,STD,4/PK,20PK/CS: Brand: MEDLINE

## (undated) DEVICE — AVID DUAL STAGE VENOUS DRAINAGE CANNULA: Brand: AVID DUAL STAGE VENOUS DRAINAGE CANNULA

## (undated) DEVICE — CLTH CLENS READYCLEANSE PERI CARE PK/5

## (undated) DEVICE — NDL PERC 1PRT THNWALL W/BASEPLT 18G 7CM

## (undated) DEVICE — ANTIBACTERIAL UNDYED BRAIDED (POLYGLACTIN 910), SYNTHETIC ABSORBABLE SUTURE: Brand: COATED VICRYL

## (undated) DEVICE — SUT PROLN 4/0 RB1 D/A 36IN 8557H

## (undated) DEVICE — PK HEART OPN 10

## (undated) DEVICE — SUT SILK 0/0 CT2 18IN C027D

## (undated) DEVICE — SUT SILK 4/0 TIES 18IN A183H

## (undated) DEVICE — TBG SXN RIGD MINI/SUCKER 9F 4.75IN

## (undated) DEVICE — 3M™ MEDIPORE™ H SOFT CLOTH SURGICAL TAPE, 2863, 3 IN X 10 YD, 12/CASE: Brand: 3M™ MEDIPORE™

## (undated) DEVICE — GLV SURG BIOGEL LTX PF 8

## (undated) DEVICE — SYS VASOVIEW HEMOPRO ENDOSCOPIC HARVST VESL

## (undated) DEVICE — 12 FOOT DISPOSABLE EXTENSION CABLE WITH SAFE CONNECT / SCREW-DOWN

## (undated) DEVICE — CATHETER,URETHRAL,REDRUBBER,STERILE,22FR: Brand: MEDLINE

## (undated) DEVICE — STERILE PVP: Brand: MEDLINE INDUSTRIES, INC.

## (undated) DEVICE — PK ATS CUST W CARDIOTOMY RESEVOIR